# Patient Record
Sex: FEMALE | Race: WHITE | NOT HISPANIC OR LATINO | Employment: OTHER | URBAN - METROPOLITAN AREA
[De-identification: names, ages, dates, MRNs, and addresses within clinical notes are randomized per-mention and may not be internally consistent; named-entity substitution may affect disease eponyms.]

---

## 2017-01-03 RX ORDER — ATENOLOL 100 MG/1
TABLET ORAL
Qty: 180 CAPSULE | Refills: 2 | Status: SHIPPED | OUTPATIENT
Start: 2017-01-03 | End: 2017-10-08 | Stop reason: SDUPTHER

## 2017-02-07 RX ORDER — METOPROLOL SUCCINATE 100 MG/1
TABLET, EXTENDED RELEASE ORAL
Qty: 90 TABLET | Refills: 2 | Status: SHIPPED | OUTPATIENT
Start: 2017-02-07 | End: 2017-11-06 | Stop reason: SDUPTHER

## 2017-02-27 ENCOUNTER — APPOINTMENT (OUTPATIENT)
Dept: WOMENS IMAGING | Facility: HOSPITAL | Age: 72
End: 2017-02-27

## 2017-02-27 PROCEDURE — G0202 SCR MAMMO BI INCL CAD: HCPCS | Performed by: RADIOLOGY

## 2017-03-15 ENCOUNTER — OFFICE VISIT (OUTPATIENT)
Dept: CARDIOLOGY | Facility: CLINIC | Age: 72
End: 2017-03-15

## 2017-03-15 VITALS
DIASTOLIC BLOOD PRESSURE: 80 MMHG | SYSTOLIC BLOOD PRESSURE: 130 MMHG | HEIGHT: 63 IN | BODY MASS INDEX: 47.31 KG/M2 | HEART RATE: 97 BPM | WEIGHT: 267 LBS

## 2017-03-15 DIAGNOSIS — G47.33 OBSTRUCTIVE SLEEP APNEA SYNDROME: ICD-10-CM

## 2017-03-15 DIAGNOSIS — E66.01 MORBID OBESITY, UNSPECIFIED OBESITY TYPE (HCC): ICD-10-CM

## 2017-03-15 DIAGNOSIS — I10 ESSENTIAL HYPERTENSION: ICD-10-CM

## 2017-03-15 DIAGNOSIS — I49.3 VENTRICULAR PREMATURE BEATS: ICD-10-CM

## 2017-03-15 DIAGNOSIS — I48.20 CHRONIC ATRIAL FIBRILLATION (HCC): Primary | ICD-10-CM

## 2017-03-15 PROCEDURE — 93000 ELECTROCARDIOGRAM COMPLETE: CPT | Performed by: INTERNAL MEDICINE

## 2017-03-15 PROCEDURE — 99214 OFFICE O/P EST MOD 30 MIN: CPT | Performed by: INTERNAL MEDICINE

## 2017-03-15 NOTE — PROGRESS NOTES
Date of Office Visit: 03/15/2017  Encounter Provider: Brit Mejia MD  Place of Service: [facility] CARDIOLOGY  Patient Name: Prema Hawthorne  :1945      Patient ID:  Prema Hawthorne is a 71 y.o. female is here for  followup for         History of Present Illness    She came to [facility] in 2011. She came in with complaints of shortness of breath and fatigue. She was found to be in atrial fibrillation with a rapid ventricular response. An echocardiogram revealed an ejection fraction of 35%. She was admitted to the hospital and had a transesophageal echocardiogram and cardioversion. She came to our office for a stress test and was back in atrial fibrillation with rapid ventricular response. Heart catheterization in  was performed which showed a normal left main, normal left anterior descending artery, normal circumflex. The right coronary artery was codominant without significant disease. She was started on amiodarone at that time. She spontaneously cardioverted back to sinus rhythm. Repeat echo in May of 2011 showed that her LV function had returned to normal at 67%, and there was no significant valvular disease.  She was switched to flecainide 50 mg a day. This was titrated up 100 mg a day, but then decreased because of bradycardia.      She continues to do well. She has no palpitations, chest pain or dyspnea. She exercises regularly doing a recumbent bike, exercising 1 hour, 5 days per week.  Her energy level is good. She has no dizziness or syncope. She has some knee pain.  She has not lost weight.  She's has no falls.       She does not know when she is in atrial fibrillation. She does have obstructive sleep apnea and follows with Dr. Tolentino. Her sleep apnea is well controlled.       Past Medical History   Diagnosis Date   • Atrial fibrillation      with rapid ventricular response   • Cardiomyopathy      nonischemic   • Hypertension    • Morbid obesity    • DAVID  (obstructive sleep apnea)    • PVC (premature ventricular contraction)          History reviewed. No pertinent past surgical history.    Current Outpatient Prescriptions on File Prior to Visit   Medication Sig Dispense Refill   • Coenzyme Q10 (COQ10 PO) Take 1 capsule by mouth daily.     • furosemide (LASIX) 40 MG tablet Take 1 tablet by mouth daily as needed (for edema). 30 tablet 1   • metoprolol succinate XL (TOPROL-XL) 100 MG 24 hr tablet TAKE 1 TABLET EVERY NIGHT 90 tablet 2   • PRADAXA 150 MG capsu TAKE 1 CAPSULE TWICE A  capsule 2   • ramipril (ALTACE) 5 MG capsule TAKE 1 CAPSULE DAILY 90 capsule 1   • [DISCONTINUED] MULTIPLE VITAMINS PO Take  by mouth daily.       No current facility-administered medications on file prior to visit.        Social History     Social History   • Marital status:      Spouse name: N/A   • Number of children: N/A   • Years of education: N/A     Occupational History   • Not on file.     Social History Main Topics   • Smoking status: Never Smoker   • Smokeless tobacco: Not on file      Comment: caffeine use   • Alcohol use Yes      Comment: social use   • Drug use: Not on file      Comment: caffeine use    • Sexual activity: Not on file     Other Topics Concern   • Not on file     Social History Narrative           Review of Systems   Constitution: Negative.   HENT: Negative for congestion and headaches.    Eyes: Negative for vision loss in left eye and vision loss in right eye.   Respiratory: Negative.  Negative for cough, hemoptysis, shortness of breath, sleep disturbances due to breathing, snoring, sputum production and wheezing.    Endocrine: Negative.    Hematologic/Lymphatic: Negative.    Skin: Negative for poor wound healing and rash.   Musculoskeletal: Negative for falls, gout, muscle cramps and myalgias.   Gastrointestinal: Negative for abdominal pain, diarrhea, dysphagia, hematemesis, melena, nausea and vomiting.   Neurological: Negative for excessive daytime  "sleepiness, dizziness, light-headedness, loss of balance, seizures and vertigo.   Psychiatric/Behavioral: Negative for depression and substance abuse. The patient is not nervous/anxious.        Procedures    ECG 12 Lead  Date/Time: 3/15/2017 12:35 PM  Performed by: OLIVA STAPLETON  Authorized by: OLIVA STAPLETON   Rhythm: atrial fibrillation  Ectopy: unifocal PVCs  Clinical impression: abnormal ECG               Objective:      Vitals:    03/15/17 1203   BP: 130/80   BP Location: Right arm   Patient Position: Sitting   Pulse: 97   Weight: 267 lb (121 kg)   Height: 63\" (160 cm)     Body mass index is 47.3 kg/(m^2).    Physical Exam   Constitutional: She is oriented to person, place, and time. She appears well-developed and well-nourished. No distress.   obese   HENT:   Head: Normocephalic and atraumatic.   Eyes: Conjunctivae are normal. No scleral icterus.   Neck: Neck supple. No JVD present. Carotid bruit is not present. No thyromegaly present.   Cardiovascular: Normal rate, regular rhythm, S1 normal, S2 normal, normal heart sounds and intact distal pulses.   No extrasystoles are present. PMI is not displaced.  Exam reveals no gallop.    No murmur heard.  Pulses:       Carotid pulses are 2+ on the right side, and 2+ on the left side.       Radial pulses are 2+ on the right side, and 2+ on the left side.        Dorsalis pedis pulses are 2+ on the right side, and 2+ on the left side.        Posterior tibial pulses are 2+ on the right side, and 2+ on the left side.   Pulmonary/Chest: Effort normal and breath sounds normal. No respiratory distress. She has no wheezes. She has no rhonchi. She has no rales. She exhibits no tenderness.   Abdominal: Soft. Bowel sounds are normal. She exhibits no distension, no abdominal bruit and no mass. There is no tenderness.   Musculoskeletal: She exhibits no edema or deformity.   Lymphadenopathy:     She has no cervical adenopathy.   Neurological: She is alert and oriented to " person, place, and time. No cranial nerve deficit.   Skin: Skin is warm and dry. No rash noted. She is not diaphoretic. No cyanosis. No pallor. Nails show no clubbing.   Psychiatric: She has a normal mood and affect. Judgment normal.   Vitals reviewed.      Lab Review:       Assessment:      Diagnosis Plan   1. Chronic atrial fibrillation     2. Essential hypertension     3. Ventricular premature beats     4. Obstructive sleep apnea syndrome     5. Morbid obesity, unspecified obesity type       1. Atrial fibrillation, rate controlled. She is anticoagulated with Pradaxa.   2. H/o Nonischemic cardiomyopathy. This was likely secondary to her atrial fibrillation with rapid ventricular response. She is on an ACE inhibitor and beta blocker. Her LV function had normalized by echo in May 2011.   3. Hypertension. Her blood pressure looks good.   4. Obstructive sleep apnea. She is compliant with her CPAP. This is well controlled.   5. Obesity. She is exercising regularly. Unfortunately, she has not lost weight. I encouraged her to continue with regular exercise.     Plan:       See back in 1 year, no changes.  Advised weight loss.     Atrial Fibrillation and Atrial Flutter  Assessment  • The patient has persistent atrial fibrillation  • The patient's CHADS2-VASc score is 3  • A [id number]-[id number] score of 2 or more is considered a high risk for a thromboembolic event    Plan  • Continue in atrial fibrillation with rate control  • Continue dabigatran for antithrombotic therapy, bleeding issues discussed  • Continue beta blocker for rate control

## 2017-05-12 RX ORDER — RAMIPRIL 5 MG/1
CAPSULE ORAL
Qty: 90 CAPSULE | Refills: 3 | Status: SHIPPED | OUTPATIENT
Start: 2017-05-12 | End: 2018-04-10 | Stop reason: SDUPTHER

## 2017-10-09 RX ORDER — ATENOLOL 100 MG/1
TABLET ORAL
Qty: 180 CAPSULE | Refills: 0 | Status: SHIPPED | OUTPATIENT
Start: 2017-10-09 | End: 2018-01-10 | Stop reason: SDUPTHER

## 2017-11-06 RX ORDER — METOPROLOL SUCCINATE 100 MG/1
TABLET, EXTENDED RELEASE ORAL
Qty: 90 TABLET | Refills: 0 | Status: SHIPPED | OUTPATIENT
Start: 2017-11-06 | End: 2018-01-13 | Stop reason: SDUPTHER

## 2017-12-11 ENCOUNTER — OFFICE VISIT (OUTPATIENT)
Dept: SLEEP MEDICINE | Facility: HOSPITAL | Age: 72
End: 2017-12-11
Attending: INTERNAL MEDICINE

## 2017-12-11 VITALS
HEART RATE: 47 BPM | HEIGHT: 64 IN | SYSTOLIC BLOOD PRESSURE: 149 MMHG | BODY MASS INDEX: 44.05 KG/M2 | WEIGHT: 258 LBS | DIASTOLIC BLOOD PRESSURE: 61 MMHG

## 2017-12-11 DIAGNOSIS — G47.33 OBSTRUCTIVE SLEEP APNEA SYNDROME: Primary | ICD-10-CM

## 2017-12-11 DIAGNOSIS — I50.22 SYSTOLIC CHF, CHRONIC (HCC): ICD-10-CM

## 2017-12-11 DIAGNOSIS — G47.34 SLEEP RELATED HYPOXIA: ICD-10-CM

## 2017-12-11 DIAGNOSIS — I48.0 PAROXYSMAL ATRIAL FIBRILLATION (HCC): ICD-10-CM

## 2017-12-11 DIAGNOSIS — E66.01 MORBID OBESITY (HCC): ICD-10-CM

## 2017-12-11 DIAGNOSIS — I10 ESSENTIAL HYPERTENSION: ICD-10-CM

## 2017-12-11 PROCEDURE — G0463 HOSPITAL OUTPT CLINIC VISIT: HCPCS

## 2017-12-11 NOTE — PROGRESS NOTES
Sleep Disorder Follow Up    Patient Name: Prema Hawthorne  Age/Sex: 72 y.o. female  : 1945  MRN: [id number]    Date of Encounter Visit: 2017  Referring Provider: Renetta Awad MD  Place of Service: [facility]  Patient Care Team:  No Known Provider as PCP - General    PROBLEM LIST:  1. Severe tricuspid sleep apnea on CPAP  2. Morbid obesity  3. Systolic congestive heart failure with EF 35%  4. Chronic atrial fibrillation on Pradaxa  5. Sleep-related hypoxemia controlled on CPAP    History of Present Illness:  Prema Hawthorne is a 72 y.o. female who was last seen in the office on 16 for history of severe obstructive sleep apnea, obesity, systolic and his heart failure, chronic A. fib and sleep-related hypoxemia controlled on CPAP.  She was initially diagnosed with severe obstructive sleep apnea in  with an AHI of 49.5 and treated with auto CPAP.    Since last visit, she has been doing well and is been able to lose some weight.  She denies any issues with her pressure but has had minimal issues with her mask with complaints of dry mouth, mainly when she has nasal congestion..Patient uses a nasal mask, which does fit well. Patient denies any air leak. Denies any excessive dry mouth.   Patient's equipment supplier is [employer] and last mask replacement was 6 months ago.  Patient sleeps better and has a deeper sleep with the CPAP and feels more energy during the day time.  Current CPAP setting 12-18 cm H20.  [facility] Sleepiness Scale (ESS) is 5.  Compliance download was reviewed and documented below.  Weight is down 1 pound since last visit.  Other comorbidities include hypertension, atrial fibrillation, systolic congestive heart failure and obesity.     Review of Systems:   Positive for nasal congestion.    A ten-system review was conducted and was otherwise negative.   Please refer to the follow up sleep questionnaire page one for details.    Past Medical History:  Past  medical, surgical, social, and family history, except as mentioned above, was unchanged from the last visit.     Past Medical History:   Diagnosis Date   • Atrial fibrillation     with rapid ventricular response   • Cardiomyopathy     nonischemic   • Hypertension    • Morbid obesity    • DAVID (obstructive sleep apnea)    • PVC (premature ventricular contraction)        No past surgical history on file.    Home Medications:     Current Outpatient Prescriptions:   •  Coenzyme Q10 (COQ10 PO), Take 1 capsule by mouth daily., Disp: , Rfl:   •  furosemide (LASIX) 40 MG tablet, Take 1 tablet by mouth daily as needed (for edema)., Disp: 30 tablet, Rfl: 1  •  metoprolol succinate XL (TOPROL-XL) 100 MG 24 hr tablet, TAKE 1 TABLET EVERY NIGHT, Disp: 90 tablet, Rfl: 0  •  PRADAXA 150 MG capsu, TAKE 1 CAPSULE TWICE A DAY, Disp: 180 capsule, Rfl: 0  •  ramipril (ALTACE) 5 MG capsule, TAKE 1 CAPSULE DAILY, Disp: 90 capsule, Rfl: 3    Allergies:  Allergies   Allergen Reactions   • Codeine        Past Social History:  Social History     Social History   • Marital status:      Spouse name: N/A   • Number of children: N/A   • Years of education: N/A     Social History Main Topics   • Smoking status: Never Smoker   • Smokeless tobacco: Not on file      Comment: caffeine use   • Alcohol use Yes      Comment: social use   • Drug use: Not on file      Comment: caffeine use    • Sexual activity: Not on file     Other Topics Concern   • Not on file     Social History Narrative       Past Family History:  Family History   Problem Relation Age of Onset   • Diabetes Mother    • Other Father      P[employer]EM[employer]ACEMENT         Objective:   Done and documented on sleep disorders center physical examination sheet, please refer to hand written note on the chart for details about the other pertinent negative findings.    Vital Signs:   Visit Vitals   • /61 (BP Location: Left arm, Patient Position: Sitting)   • Pulse (!) 47   • Ht 162.6 cm  "(64\")   • Wt 117 kg (258 lb)  Comment: per patient   • BMI 44.29 kg/m2     Wt Readings from Last 3 Encounters:   12/11/17 117 kg (258 lb)   03/15/17 121 kg (267 lb)   03/15/16 116 kg (255 lb)          Physical Exam:   General: AAOx3. Normal mood and affect.   HEENT:  Moist mucous membranes.  Septum midline. Mallampati 3-4 airway.     LUNGS: Non-labored breathing. CTAB. No wheezes.  HEART: Regular rate and rhythm. No murmur. Normal s1/s2  EXTREMITIES: 1+ edema. No cyanosis or clubbing. Normal gait    Diagnostic Data:  NPSG on 04/21/2011 showed AHI of 49.5 treated with auto CPAP.    Compliance download from the last 30 days [id number] compliance with average use of 7 hours and 3 minutes per night. Residual AHI of 1.6 on CPAP at 12-18 cm H20 with 0 seconds of average time in large air leak per day. Majority (90%) of the time the pressure is staying at or below 13.5 cm H2O and a mean pressure of 12.3 cm H2O.      Assessment and Plan:       ICD-10-CM ICD-9-CM   1. Severe DAVID on CPAP G47.33 327.23   2. Paroxysmal atrial fibrillation I48.0 427.31   3. Essential hypertension I10 401.9   4. Morbid obesity E66.01 278.01   5. Systolic CHF, chronic I50.22 428.22     428.0   6. Sleep related hypoxia G47.34 327.24       Recommendations:     Patient is compliant and has good control of DAVID on CPAP with clinical and subjective improvement and will continue on same setting of auto 12-18 cmH2O.     Encouraged weight loss as can help reduce symptoms of sleep apnea and improve overall health.    Sleep related hypoxia controlled on CPAP.     No orders of the defined types were placed in this encounter.    Return in about 1 year (around 12/11/2018).    Deloris Tolentino MD   [facility]   12/28/17  3:24 AM    EMR Dragon/Transcription disclaimer:   Much of this encounter note is an electronic transcription/translation of spoken language to printed text. The electronic translation of spoken language may permit erroneous, or at " times, nonsensical words or phrases to be inadvertently transcribed; Although I have reviewed the note for such errors, some may still exist.

## 2018-01-10 RX ORDER — ATENOLOL 100 MG/1
TABLET ORAL
Qty: 180 CAPSULE | Refills: 0 | Status: SHIPPED | OUTPATIENT
Start: 2018-01-10 | End: 2018-04-10 | Stop reason: SDUPTHER

## 2018-01-15 RX ORDER — METOPROLOL SUCCINATE 100 MG/1
TABLET, EXTENDED RELEASE ORAL
Qty: 90 TABLET | Refills: 0 | Status: SHIPPED | OUTPATIENT
Start: 2018-01-15 | End: 2018-06-04 | Stop reason: SDUPTHER

## 2018-03-02 ENCOUNTER — APPOINTMENT (OUTPATIENT)
Dept: WOMENS IMAGING | Facility: HOSPITAL | Age: 73
End: 2018-03-02

## 2018-03-02 PROCEDURE — 77067 SCR MAMMO BI INCL CAD: CPT | Performed by: RADIOLOGY

## 2018-03-02 PROCEDURE — 77063 BREAST TOMOSYNTHESIS BI: CPT | Performed by: RADIOLOGY

## 2018-04-10 RX ORDER — RAMIPRIL 5 MG/1
CAPSULE ORAL
Qty: 90 CAPSULE | Refills: 0 | Status: SHIPPED | OUTPATIENT
Start: 2018-04-10 | End: 2018-05-09

## 2018-04-10 RX ORDER — ATENOLOL 100 MG/1
TABLET ORAL
Qty: 180 CAPSULE | Refills: 0 | Status: SHIPPED | OUTPATIENT
Start: 2018-04-10 | End: 2018-07-11 | Stop reason: SDUPTHER

## 2018-05-09 ENCOUNTER — OFFICE VISIT (OUTPATIENT)
Dept: CARDIOLOGY | Facility: CLINIC | Age: 73
End: 2018-05-09

## 2018-05-09 VITALS
WEIGHT: 272 LBS | BODY MASS INDEX: 48.2 KG/M2 | DIASTOLIC BLOOD PRESSURE: 100 MMHG | HEART RATE: 81 BPM | SYSTOLIC BLOOD PRESSURE: 150 MMHG | HEIGHT: 63 IN

## 2018-05-09 DIAGNOSIS — I10 ESSENTIAL HYPERTENSION: Primary | ICD-10-CM

## 2018-05-09 DIAGNOSIS — G47.33 OBSTRUCTIVE SLEEP APNEA SYNDROME: ICD-10-CM

## 2018-05-09 DIAGNOSIS — I48.0 PAROXYSMAL ATRIAL FIBRILLATION (HCC): ICD-10-CM

## 2018-05-09 DIAGNOSIS — I49.3 VENTRICULAR PREMATURE BEATS: ICD-10-CM

## 2018-05-09 DIAGNOSIS — E66.01 MORBID OBESITY (HCC): ICD-10-CM

## 2018-05-09 PROCEDURE — 99214 OFFICE O/P EST MOD 30 MIN: CPT | Performed by: INTERNAL MEDICINE

## 2018-05-09 PROCEDURE — 93000 ELECTROCARDIOGRAM COMPLETE: CPT | Performed by: INTERNAL MEDICINE

## 2018-05-09 RX ORDER — RAMIPRIL 10 MG/1
10 CAPSULE ORAL DAILY
Qty: 90 CAPSULE | Refills: 3 | Status: SHIPPED | OUTPATIENT
Start: 2018-05-09 | End: 2019-01-16 | Stop reason: SDUPTHER

## 2018-05-09 NOTE — PROGRESS NOTES
Date of Office Visit: 2018  Encounter Provider: Brit Mejia MD  Place of Service: [facility] CARDIOLOGY  Patient Name: Prema Hawthorne  :1945      Patient ID:  Prema Hawthorne is a 72 y.o. female is here for  followup for atrial fibrillation.         History of Present Illness    She came to [facility] in 2011. She came in with complaints of shortness of breath and fatigue. She was found to be in atrial fibrillation with a rapid ventricular response. An echocardiogram revealed an ejection fraction of 35%. She was admitted to the hospital and had a transesophageal echocardiogram and cardioversion. She came to our office for a stress test and was back in atrial fibrillation with rapid ventricular response. Heart catheterization in  was performed which showed a normal left main, normal left anterior descending artery, normal circumflex. The right coronary artery was codominant without significant disease. She was started on amiodarone at that time. She spontaneously cardioverted back to sinus rhythm. Repeat echo in May of 2011 showed that her LV function had returned to normal at 67%, and there was no significant valvular disease.       She does not know when she is in atrial fibrillation. She does have obstructive sleep apnea and follows with Dr. Tolentino. Her sleep apnea is well controlled.     She is overall doing fine.  She's not lost weight.  She is exercising the pool.  She doesn't feel her heart racing or skipping.  She has no dizziness or syncope.  She's had no difficulty breathing.  Her energy level is somewhat low.  Her blood pressure is high here today.  She's been under some stress with her .  She is tolerating her medications well taking in as directed.    Past Medical History:   Diagnosis Date   • Atrial fibrillation     with rapid ventricular response   • Cardiomyopathy     nonischemic   • Essential hypertension    • Morbid obesity    • DAVID  (obstructive sleep apnea)    • PVC (premature ventricular contraction)          History reviewed. No pertinent surgical history.    Current Outpatient Prescriptions on File Prior to Visit   Medication Sig Dispense Refill   • Coenzyme Q10 (COQ10 PO) Take 1 capsule by mouth daily.     • furosemide (LASIX) 40 MG tablet Take 1 tablet by mouth daily as needed (for edema). 30 tablet 1   • metoprolol succinate XL (TOPROL-XL) 100 MG 24 hr tablet TAKE 1 TABLET EVERY NIGHT 90 tablet 0   • PRADAXA 150 MG capsu TAKE 1 CAPSULE TWICE A DAY (NEED TO CALL THE OFFICE TO MAKE AN APPOINTMENT FOR MORE FUTURE REFILLS) 180 capsule 0   • ramipril (ALTACE) 5 MG capsule TAKE 1 CAPSULE DAILY 90 capsule 0     No current facility-administered medications on file prior to visit.        Social History     Social History   • Marital status:      Spouse name: N/A   • Number of children: N/A   • Years of education: N/A     Occupational History   • Not on file.     Social History Main Topics   • Smoking status: Never Smoker   • Smokeless tobacco: Never Used      Comment: caffeine use   • Alcohol use Yes      Comment: social use   • Drug use: Unknown      Comment: caffeine use    • Sexual activity: Not on file     Other Topics Concern   • Not on file     Social History Narrative   • No narrative on file           Review of Systems   Constitution: Negative.   HENT: Negative for congestion.    Eyes: Negative for vision loss in left eye and vision loss in right eye.   Respiratory: Negative.  Negative for cough, hemoptysis, shortness of breath, sleep disturbances due to breathing, snoring, sputum production and wheezing.    Endocrine: Negative.    Hematologic/Lymphatic: Negative.    Skin: Negative for poor wound healing and rash.   Musculoskeletal: Negative for falls, gout, muscle cramps and myalgias.   Gastrointestinal: Negative for abdominal pain, diarrhea, dysphagia, hematemesis, melena, nausea and vomiting.   Neurological: Negative for  "excessive daytime sleepiness, dizziness, headaches, light-headedness, loss of balance, seizures and vertigo.   Psychiatric/Behavioral: Negative for depression and substance abuse. The patient is not nervous/anxious.        Procedures    ECG 12 Lead  Date/Time: 5/9/2018 9:53 AM  Performed by: OLIVA STAPLETON  Authorized by: OLIVA STAPLETON   Comparison: compared with previous ECG   Similar to previous ECG  Rhythm: atrial fibrillation  Ectopy: PVCs  Clinical impression: abnormal ECG                Objective:      Vitals:    05/09/18 0942   BP: 150/100   BP Location: Right arm   Patient Position: Sitting   Pulse: 81   Weight: 123 kg (272 lb)   Height: 160 cm (63\")     Body mass index is 48.18 kg/m².    Physical Exam   Constitutional: She is oriented to person, place, and time. She appears well-developed and well-nourished. No distress.   HENT:   Head: Normocephalic and atraumatic.   Eyes: Conjunctivae are normal. No scleral icterus.   Neck: Neck supple. No JVD present. Carotid bruit is not present. No thyromegaly present.   Cardiovascular: Normal rate, S1 normal, S2 normal, normal heart sounds and intact distal pulses.  An irregularly irregular rhythm present.  No extrasystoles are present. PMI is not displaced.  Exam reveals no gallop.    No murmur heard.  Pulses:       Carotid pulses are 2+ on the right side, and 2+ on the left side.       Radial pulses are 2+ on the right side, and 2+ on the left side.        Dorsalis pedis pulses are 2+ on the right side, and 2+ on the left side.        Posterior tibial pulses are 2+ on the right side, and 2+ on the left side.   Pulmonary/Chest: Effort normal and breath sounds normal. No respiratory distress. She has no wheezes. She has no rhonchi. She has no rales. She exhibits no tenderness.   Abdominal: Soft. Bowel sounds are normal. She exhibits no distension, no abdominal bruit and no mass. There is no tenderness.   Musculoskeletal: She exhibits no edema or " deformity.   Lymphadenopathy:     She has no cervical adenopathy.   Neurological: She is alert and oriented to person, place, and time. No cranial nerve deficit.   Skin: Skin is warm and dry. No rash noted. She is not diaphoretic. No cyanosis. No pallor. Nails show no clubbing.   Psychiatric: She has a normal mood and affect. Judgment normal.   Vitals reviewed.      Lab Review:       Assessment:      Diagnosis Plan   1. Essential hypertension     2. Paroxysmal atrial fibrillation     3. Obstructive sleep apnea syndrome     4. Ventricular premature beats     5. Morbid obesity       1. Atrial fibrillation, rate controlled. She is anticoagulated with Pradaxa.   2. H/o Nonischemic cardiomyopathy. This was likely secondary to her atrial fibrillation with rapid ventricular response. She is on an ACE inhibitor and beta blocker. Her LV function had normalized by echo in May 2011.   3. Hypertension. Her blood pressure is high, increase altace to 10mg daily.   4. Obstructive sleep apnea. She is compliant with her CPAP. This is well controlled.   5. Obesity. She is exercising regularly. Unfortunately, she has not lost weight. I encouraged her to continue with regular exercise.     Plan:       See jaime in 6 months.     Atrial Fibrillation and Atrial Flutter  Assessment  • The patient has permanent atrial fibrillation  • This is non-valvular in etiology  • The patient's CHADS2-VASc score is 3  • A [id number] score of 2 or more is considered a high risk for a thromboembolic event  • Dabigatran prescribed    Plan  • Continue in atrial fibrillation with rate control  • Continue dabigatran for antithrombotic therapy, bleeding issues discussed  • Continue beta blocker for rate control

## 2018-06-04 RX ORDER — METOPROLOL SUCCINATE 100 MG/1
TABLET, EXTENDED RELEASE ORAL
Qty: 90 TABLET | Refills: 2 | Status: SHIPPED | OUTPATIENT
Start: 2018-06-04 | End: 2019-01-16 | Stop reason: SDUPTHER

## 2018-07-11 RX ORDER — DABIGATRAN ETEXILATE 150 MG/1
150 CAPSULE ORAL EVERY 12 HOURS SCHEDULED
Qty: 180 CAPSULE | Refills: 0 | Status: SHIPPED | OUTPATIENT
Start: 2018-07-11 | End: 2018-09-16 | Stop reason: SDUPTHER

## 2018-09-17 RX ORDER — ATENOLOL 100 MG/1
TABLET ORAL
Qty: 180 CAPSULE | Refills: 0 | Status: SHIPPED | OUTPATIENT
Start: 2018-09-17 | End: 2018-12-04 | Stop reason: SDUPTHER

## 2018-12-04 RX ORDER — DABIGATRAN ETEXILATE 150 MG/1
150 CAPSULE ORAL 2 TIMES DAILY
Qty: 180 CAPSULE | Refills: 1 | Status: SHIPPED | OUTPATIENT
Start: 2018-12-04 | End: 2019-06-21 | Stop reason: SDUPTHER

## 2018-12-10 ENCOUNTER — OFFICE VISIT (OUTPATIENT)
Dept: SLEEP MEDICINE | Facility: HOSPITAL | Age: 73
End: 2018-12-10
Attending: INTERNAL MEDICINE

## 2018-12-10 VITALS
DIASTOLIC BLOOD PRESSURE: 88 MMHG | BODY MASS INDEX: 48.18 KG/M2 | HEART RATE: 97 BPM | HEIGHT: 63 IN | SYSTOLIC BLOOD PRESSURE: 153 MMHG

## 2018-12-10 DIAGNOSIS — I10 ESSENTIAL HYPERTENSION: ICD-10-CM

## 2018-12-10 DIAGNOSIS — I48.0 PAROXYSMAL ATRIAL FIBRILLATION (HCC): ICD-10-CM

## 2018-12-10 DIAGNOSIS — G47.33 OBSTRUCTIVE SLEEP APNEA SYNDROME: Primary | ICD-10-CM

## 2018-12-10 DIAGNOSIS — G47.34 SLEEP RELATED HYPOXIA: ICD-10-CM

## 2018-12-10 DIAGNOSIS — E66.01 MORBID OBESITY (HCC): ICD-10-CM

## 2018-12-10 PROCEDURE — G0463 HOSPITAL OUTPT CLINIC VISIT: HCPCS

## 2018-12-10 NOTE — PROGRESS NOTES
Sleep Disorder Follow Up    Patient Name: Prema Hawthorne  Age/Sex: 73 y.o. female  : 1945  MRN: [id number]    Date of Encounter Visit: 12/10/18  Referring Provider: Deloris Tolentino MD  Place of Service: [facility]  Patient Care Team:  Renetta Awad MD as PCP - General (Internal Medicine)    PROBLEM LIST:  1. Severe obstructive sleep apnea on CPAP  2. Morbid obesity  3. Systolic congestive heart failure with EF 35%  4. Chronic atrial fibrillation on Pradaxa  5. Sleep-related hypoxemia controlled on CPAP    History of Present Illness:  Prema Hawthorne is a 73 y.o. female who is here for follow up on severe DAVID and sleep related hypoxia on CPAP. Patient was last seen in the office on 17.    Since last visit, she is doing well, but gained some weight while caring for her  who was going through radiation. She now has some complaints of worsening dyspnea with exertion.    Patient uses machine every night with no complaints from the mask or the pressure.  Patient uses a nasal mask, which does fit well. Patient denies any air leak or dry mouth.   Patient's equipment supplier is [employer].  Patient sleeps better and has a deeper sleep with the machine and feels more energy during the day time.  Currently on auto CPAP 12-18 cm H20.  [place] Sleepiness Scale (ESS) is 5.  Compliance download was reviewed and documented below.  Weight is up about 10-15 pounds since last visit as reported by patient since she refused to be weighed today.  Other comorbidities include hypertension, atrial fibrillation, systolic congestive heart failure and obesity.     Review of Systems:   A ten-system review was conducted and was negative.   Please refer to the follow up sleep questionnaire page one for details.    Past Medical History:  Past medical, surgical, social, and family history, except as mentioned above, was unchanged from the last visit.     Past Medical History:   Diagnosis Date   • Atrial  "fibrillation (CMS/HCC)     with rapid ventricular response   • Cardiomyopathy (CMS/HCC)     nonischemic   • Essential hypertension    • Morbid obesity (CMS/HCC)    • DAVID (obstructive sleep apnea)    • PVC (premature ventricular contraction)        No past surgical history on file.    Home Medications:     Current Outpatient Medications:   •  Coenzyme Q10 (COQ10 PO), Take 1 capsule by mouth daily., Disp: , Rfl:   •  dabigatran etexilate (PRADAXA) 150 MG capsu, Take 1 capsule by mouth 2 (Two) Times a Day., Disp: 180 capsule, Rfl: 1  •  furosemide (LASIX) 40 MG tablet, Take 1 tablet by mouth daily as needed (for edema)., Disp: 30 tablet, Rfl: 1  •  metoprolol succinate XL (TOPROL-XL) 100 MG 24 hr tablet, TAKE 1 TABLET EVERY NIGHT, Disp: 90 tablet, Rfl: 2  •  ramipril (ALTACE) 10 MG capsule, Take 1 capsule by mouth Daily., Disp: 90 capsule, Rfl: 3  •  Turmeric Curcumin 500 MG capsule, Take 1 tablet by mouth Daily., Disp: , Rfl:     Allergies:  Allergies   Allergen Reactions   • Codeine        Past Social History:  Social History     Socioeconomic History   • Marital status:      Spouse name: Not on file   • Number of children: Not on file   • Years of education: Not on file   • Highest education level: Not on file   Tobacco Use   • Smoking status: Never Smoker   • Smokeless tobacco: Never Used   • Tobacco comment: caffeine use   Substance and Sexual Activity   • Alcohol use: Yes     Comment: social use       Past Family History:  Family History   Problem Relation Age of Onset   • Diabetes Mother    • Other Father         PACEMAKER PLACEMENT         Objective:   Done and documented on sleep disorders center physical examination sheet, please refer to hand written note on the chart for details about the other pertinent negative findings.    Vital Signs:   Visit Vitals  /88   Pulse 97   Ht 160 cm (63\")   BMI 48.18 kg/m²     Wt Readings from Last 3 Encounters:   05/09/18 123 kg (272 lb)   12/11/17 117 kg (258 " lb)   03/15/17 121 kg (267 lb)     Neck Circumference: 15 inches    Physical Exam:   General: AAOx3. Normal mood and affect.   HEENT:  Moist mucous membranes.  Septum midline. Mallampati 4 airway. Extra tonsillar folds.   LUNGS: Non-labored breathing. CTAB. No wheezes.  HEART: irregular rhythm and borderline tachycardia. No murmur. Normal s1/s2  EXTREMITIES: trace edema. No cyanosis or clubbing. Normal gait    Diagnostic Data:  NPSG on 04/21/2011 showed AHI of 49.5 treated with auto CPAP.    Compliance download from 11/7/18-12/6/18 showed 100% compliant with average use of 7 hours and 5 minutes. Residual AHI 3.6 on auto CPAP 12-18 cmH20 with mean pressure 12.4 cmH20 and 90th percentile pressure of 13.7 cmH20. No significant air leak.     Assessment and Plan:       ICD-10-CM ICD-9-CM   1. Obstructive sleep apnea syndrome G47.33 327.23   2. Sleep related hypoxia G47.34 327.24   3. Paroxysmal atrial fibrillation (CMS/[facility]) I48.0 427.31   4. Morbid obesity (CMS/[facility]) E66.01 278.01   5. Essential hypertension I10 401.9   6. Allergic rhinitis, unspecified seasonality, unspecified trigger J30.9 477.9       Recommendations:     Patient is compliant with PAP therapy and has good control of DAVID with clinical and subjective improvement and plans to continue CPAP.   · Continue CPAP at 12-18 cmH20.  · Renew and replace supplies as indicated.     Blood pressure borderline today.    Encouraged to monitor blood pressure and follow with cardiology   Reduce salt intake,   Continue  Metoprolol and ramipril.   She has been out of the lasix and if BP consistently high, she may benefit from adding diuretic to treatment regimen as she was using the lasix at least once a week to help with swelling.    HR is irregular today. Continue pradaxa and metoprolol. Monitor saturations and HR with exertion and bring results to follow up with cardiologist.     Consider overnight oximetry on CPAP if any worsening of symptoms.     Patient has gained  weight recently and plans to work on weight loss with weight watchers and increase exercise with recumbent bike.       No orders of the defined types were placed in this encounter.    No orders of the defined types were placed in this encounter.    Return in about 1 year (around 12/10/2019).    Deloris Tolentino MD   [facility]   12/10/18  1:41 PM    Dictated utilizing Dragon dictation

## 2018-12-14 PROBLEM — G47.34 SLEEP RELATED HYPOXIA: Status: ACTIVE | Noted: 2018-12-14

## 2019-01-09 ENCOUNTER — OFFICE VISIT (OUTPATIENT)
Dept: CARDIOLOGY | Facility: CLINIC | Age: 74
End: 2019-01-09

## 2019-01-09 VITALS
HEIGHT: 63 IN | BODY MASS INDEX: 49.79 KG/M2 | WEIGHT: 281 LBS | DIASTOLIC BLOOD PRESSURE: 88 MMHG | SYSTOLIC BLOOD PRESSURE: 148 MMHG | HEART RATE: 83 BPM

## 2019-01-09 DIAGNOSIS — G47.33 OBSTRUCTIVE SLEEP APNEA SYNDROME: ICD-10-CM

## 2019-01-09 DIAGNOSIS — I49.3 PVC (PREMATURE VENTRICULAR CONTRACTION): ICD-10-CM

## 2019-01-09 DIAGNOSIS — E66.01 MORBID OBESITY (HCC): ICD-10-CM

## 2019-01-09 DIAGNOSIS — I48.19 PERSISTENT ATRIAL FIBRILLATION (HCC): Primary | ICD-10-CM

## 2019-01-09 DIAGNOSIS — Z79.01 CHRONIC ANTICOAGULATION: ICD-10-CM

## 2019-01-09 DIAGNOSIS — I10 ESSENTIAL HYPERTENSION: ICD-10-CM

## 2019-01-09 PROCEDURE — 93000 ELECTROCARDIOGRAM COMPLETE: CPT | Performed by: NURSE PRACTITIONER

## 2019-01-09 PROCEDURE — 99214 OFFICE O/P EST MOD 30 MIN: CPT | Performed by: NURSE PRACTITIONER

## 2019-01-09 NOTE — PROGRESS NOTES
Date of Office Visit: 2019  Encounter Provider: MARCELINO Plascencia  Place of Service: [facility] CARD[facility]  Patient Name: Prema Hawthorne  :1945      Chief Complaint   Patient presents with   • Atrial Fibrillation   • Hypertension   :     Dear      HPI: Prema Hawthorne is a pleasant 73 y.o. female who presents today for cardiac follow up. She has a history of hypertension, obesity, and obstructive sleep apnea.  She was diagnosed with atrial fibrillation in 2011 and an echocardiogram revealed an EF of 35%.  She had a cardioversion completed and then developed atrial fibrillation once again.  She had a heart catheterization in  which showed normal coronary arteries.  Repeat echocardiogram 2011 revealed an improved ejection fraction is 67%.  She has remains on dabigatran and Toprol-XL for treatment of atrial fibrillation.  She was last seen in the office by Dr. Mejia in May 2018.    She presents today for her 6 month follow-up.  She reports mild dyspnea upon exertion that resolves with rest and this is unchanged.  She denies chest pain, PND, orthopnea, cough, edema, palpitations, dizziness, or syncope.  Occasionally she feels hot sweats.  She suffers from bilateral knee pain and has recently been undergoing knee injections.  She said she is not ready for surgery at this point.  She has a history of sleep apnea and is compliant with her CPAP machine.  Both her blood pressure and heart rate are elevated today and she says she's very nervous to be here.  She remains asymptomatic with her atrial fibrillation.  We called her previous PCP office and she has not had any blood work completed since 2016.  She said she is in the process of finding a new primary care physician.  She has remained on dabigatran and denies any bleeding.     Past Medical History:   Diagnosis Date   • Atrial fibrillation (CMS/HCC)     with rapid ventricular response   • Cardiomyopathy  (CMS/[facility])     nonischemic   • Essential hypertension    • Morbid obesity (CMS/[facility])    • DAVID (obstructive sleep apnea)    • PVC (premature ventricular contraction)    • Ventricular premature beats 3/15/2016       History reviewed. No pertinent surgical history.    Social History     Socioeconomic History   • Marital status:      Spouse name: Not on file   • Number of children: Not on file   • Years of education: Not on file   • Highest education level: Not on file   Social Needs   • Financial resource strain: Not on file   • Food insecurity - worry: Not on file   • Food insecurity - inability: Not on file   • Transportation needs - medical: Not on file   • Transportation needs - non-medical: Not on file   Occupational History   • Not on file   Tobacco Use   • Smoking status: Never Smoker   • Smokeless tobacco: Never Used   • Tobacco comment: caffeine use   Substance and Sexual Activity   • Alcohol use: Yes     Comment: social use   • Drug use: Not on file     Comment: caffeine use    • Sexual activity: Not on file   Other Topics Concern   • Not on file   Social History Narrative   • Not on file       Family History   Problem Relation Age of Onset   • Diabetes Mother    • Other Father         PACEMAKER PLACEMENT       The following portion of the patient's history were reviewed and updated as appropriate: past medical history, past surgical history, past social history, past family history, allergies, current medications, and problem list.    Review of Systems   Constitution: Negative for chills, diaphoresis, fever, malaise/fatigue, night sweats, weight gain and weight loss.   HENT: Negative for hearing loss, nosebleeds, sore throat and tinnitus.    Eyes: Negative for blurred vision, double vision, pain and visual disturbance.   Cardiovascular: Positive for dyspnea on exertion. Negative for chest pain, claudication, cyanosis, irregular heartbeat, leg swelling, near-syncope, orthopnea, palpitations, paroxysmal  "nocturnal dyspnea and syncope.   Respiratory: Negative for cough, hemoptysis, shortness of breath, snoring and wheezing.    Endocrine: Negative for cold intolerance, heat intolerance and polyuria.   Hematologic/Lymphatic: Negative for bleeding problem. Does not bruise/bleed easily.   Skin: Negative for color change, dry skin, flushing and itching.   Musculoskeletal: Negative for falls, joint pain, joint swelling, muscle cramps, muscle weakness and myalgias.   Gastrointestinal: Negative for abdominal pain, constipation, heartburn, melena, nausea and vomiting.   Genitourinary: Negative for dysuria and hematuria.   Neurological: Negative for excessive daytime sleepiness, dizziness, light-headedness, loss of balance, numbness, paresthesias, seizures and vertigo.   Psychiatric/Behavioral: Negative for altered mental status, depression, memory loss and substance abuse. The patient does not have insomnia and is not nervous/anxious.    Allergic/Immunologic: Negative for environmental allergies.       Allergies   Allergen Reactions   • Codeine          Current Outpatient Medications:   •  Coenzyme Q10 (COQ10 PO), Take 1 capsule by mouth daily., Disp: , Rfl:   •  dabigatran etexilate (PRADAXA) 150 MG capsu, Take 1 capsule by mouth 2 (Two) Times a Day., Disp: 180 capsule, Rfl: 1  •  metoprolol succinate XL (TOPROL-XL) 100 MG 24 hr tablet, TAKE 1 TABLET EVERY NIGHT, Disp: 90 tablet, Rfl: 2  •  ramipril (ALTACE) 10 MG capsule, Take 1 capsule by mouth Daily., Disp: 90 capsule, Rfl: 3  •  Turmeric Curcumin 500 MG capsule, Take 1 tablet by mouth Daily., Disp: , Rfl:         Objective:     Vitals:    01/09/19 1144 01/09/19 1215   BP: 148/88    BP Location: Left arm    Patient Position: Sitting    Pulse: 117 83   Weight: 127 kg (281 lb)    Height: 160 cm (63\")      Body mass index is 49.78 kg/m².    PHYSICAL EXAM:    Vitals Reviewed.   General Appearance: No acute distress, well developed and well nourished.  Obese.  Eyes: " Conjunctiva and lids: No erythema, swelling, or discharge. Sclera non-icteric.   HENT: Atraumatic, normocephalic. External eyes, ears, and nose normal. No hearing loss noted. Mucous membranes normal. Lips not cyanotic. Neck supple with no tenderness.  Respiratory: No signs of respiratory distress. Respiration rhythm and depth normal.   Clear to auscultation. No rales, crackles, rhonchi, or wheezing auscultated.   Cardiovascular:  Jugular Venous Pressure: Normal  Heart Rate and Rhythm: Irregularly, irregular.  Heart Sounds: Normal S1 and S2. No S3 or S4 noted.  Murmurs: No murmurs noted. No rubs, thrills, or gallops.   Arterial Pulses: Carotid pulses normal. No carotid bruit noted. Posterior tibialis and dorsalis pedis pulses normal.   Lower Extremities: No edema noted.  Gastrointestinal:  Abdomen soft, non-distended, non-tender. Normal bowel sounds. No hepatomegaly.   Musculoskeletal: Normal movement of extremities  Skin and Nails: General appearance normal. No pallor, cyanosis, diaphoresis. Skin temperature normal. No clubbing of fingernails.   Psychiatric: Patient alert and oriented to person, place, and time. Speech and behavior appropriate. Normal mood and affect.       ECG 12 Lead  Date/Time: 1/9/2019 11:49 AM  Performed by: Aditi Louis APRN  Authorized by: Aditi Louis APRN   Comparison: compared with previous ECG from 5/9/2018  Similar to previous ECG  Rhythm: atrial fibrillation  Ectopy: PVCs  Rate: tachycardic  BPM: 117  Conduction: conduction normal  T Waves: T waves normal  QRS axis: normal  Q waves: V1 and V2  Clinical impression: abnormal ECG  Comments: ST-T abnormalities noted              Assessment:       Diagnosis Plan   1. Persistent atrial fibrillation (CMS/HCC)  Holter Monitor - 24 Hour    Comprehensive Metabolic Panel    CBC Auto Differential    TSH    Lipid Panel   2. PVC (premature ventricular contraction)     3. Chronic anticoagulation     4. Essential hypertension  Lipid Panel    5. Morbid obesity (CMS/[facility])     6. Obstructive sleep apnea syndrome            Plan:       1.  Persistent Atrial fibrillation: She remains in asymptomatic atrial fibrillation.  She has had some heart rate variability with home heart rates in the 40s and today she's 117 bpm on the EKG.  She is on Toprol- mg daily which is a good dosage.  I've recommended that we further evaluate her heart rates with a 24-hour Holter monitor.  She has PVCs which could also be contributing to abnormal heart rate calculations.  She remains on dabigatran for stroke prevention.  She has not had blood work completed since 2016 and I've recommended a CBC, CMP, TSH, and a lipid panel.     Atrial Fibrillation and Atrial Flutter  Assessment  • The patient has persistent atrial fibrillation  • The patient's CHADS2-VASc score is 4  • A [id number] score of 2 or more is considered a high risk for a thromboembolic event  • Dabigatran prescribed    Plan  • Continue in atrial fibrillation with rate control  • Continue dabigatran for antithrombotic therapy, bleeding issues discussed  • Continue beta blocker for rate control      2.  PVCs: Noted on today's EKG and she is asymptomatic.  Continue Toprol-XL.    3.  Systolic Heart Failure: Ejection fraction has normalized.    4.  Hypertension: Blood pressure is elevated today.  I reviewed her home blood pressure readings which average 130s over 80s.    5.  Morbid Obesity: BMI 49.8.  She discussed weight loss and getting back into the swimming pool.    6.  Obstructive Sleep Apnea: Compliant with CPAP machine.    7.  Follow-up with Dr. Brit Mejia in 6 months, unless otherwise needed sooner.    ADDENDUM: She needs a repeat lipid panel in July 2019 to be ordered by Dr. Mejia.     As always, it has been a pleasure to participate in your patient's care. Thank you.       Sincerely,         MARCELINO Perry

## 2019-01-11 ENCOUNTER — TELEPHONE (OUTPATIENT)
Dept: CARDIOLOGY | Facility: CLINIC | Age: 74
End: 2019-01-11

## 2019-01-11 ENCOUNTER — LAB (OUTPATIENT)
Dept: LAB | Facility: HOSPITAL | Age: 74
End: 2019-01-11

## 2019-01-11 DIAGNOSIS — I10 ESSENTIAL HYPERTENSION: ICD-10-CM

## 2019-01-11 DIAGNOSIS — I48.19 PERSISTENT ATRIAL FIBRILLATION (HCC): ICD-10-CM

## 2019-01-11 LAB
ALBUMIN SERPL-MCNC: 4.1 G/DL (ref 3.5–5.2)
ALBUMIN/GLOB SERPL: 1.1 G/DL
ALP SERPL-CCNC: 51 U/L (ref 39–117)
ALT SERPL W P-5'-P-CCNC: 17 U/L (ref 1–33)
ANION GAP SERPL CALCULATED.3IONS-SCNC: 13.3 MMOL/L
AST SERPL-CCNC: 21 U/L (ref 1–32)
BASOPHILS # BLD AUTO: 0.04 10*3/MM3 (ref 0–0.2)
BASOPHILS NFR BLD AUTO: 0.6 % (ref 0–1.5)
BILIRUB SERPL-MCNC: 0.6 MG/DL (ref 0.1–1.2)
BUN BLD-MCNC: 24 MG/DL (ref 8–23)
BUN/CREAT SERPL: 25 (ref 7–25)
CALCIUM SPEC-SCNC: 10.1 MG/DL (ref 8.6–10.5)
CHLORIDE SERPL-SCNC: 104 MMOL/L (ref 98–107)
CHOLEST SERPL-MCNC: 217 MG/DL (ref 0–200)
CO2 SERPL-SCNC: 24.7 MMOL/L (ref 22–29)
CREAT BLD-MCNC: 0.96 MG/DL (ref 0.57–1)
DEPRECATED RDW RBC AUTO: 49.5 FL (ref 37–54)
EOSINOPHIL # BLD AUTO: 0.09 10*3/MM3 (ref 0–0.7)
EOSINOPHIL NFR BLD AUTO: 1.2 % (ref 0.3–6.2)
ERYTHROCYTE [DISTWIDTH] IN BLOOD BY AUTOMATED COUNT: 13.9 % (ref 11.7–13)
GFR SERPL CREATININE-BSD FRML MDRD: 57 ML/MIN/1.73
GLOBULIN UR ELPH-MCNC: 3.7 GM/DL
GLUCOSE BLD-MCNC: 85 MG/DL (ref 65–99)
HCT VFR BLD AUTO: 47.6 % (ref 35.6–45.5)
HDLC SERPL-MCNC: 42 MG/DL (ref 40–60)
HGB BLD-MCNC: 15.5 G/DL (ref 11.9–15.5)
IMM GRANULOCYTES # BLD AUTO: 0.08 10*3/MM3 (ref 0–0.03)
IMM GRANULOCYTES NFR BLD AUTO: 1.1 % (ref 0–0.5)
LDLC SERPL CALC-MCNC: 153 MG/DL (ref 0–100)
LDLC/HDLC SERPL: 3.64 {RATIO}
LYMPHOCYTES # BLD AUTO: 2.3 10*3/MM3 (ref 0.9–4.8)
LYMPHOCYTES NFR BLD AUTO: 31.6 % (ref 19.6–45.3)
MCH RBC QN AUTO: 31.6 PG (ref 26.9–32)
MCHC RBC AUTO-ENTMCNC: 32.6 G/DL (ref 32.4–36.3)
MCV RBC AUTO: 97.1 FL (ref 80.5–98.2)
MONOCYTES # BLD AUTO: 0.61 10*3/MM3 (ref 0.2–1.2)
MONOCYTES NFR BLD AUTO: 8.4 % (ref 5–12)
NEUTROPHILS # BLD AUTO: 4.15 10*3/MM3 (ref 1.9–8.1)
NEUTROPHILS NFR BLD AUTO: 57.1 % (ref 42.7–76)
PLATELET # BLD AUTO: 291 10*3/MM3 (ref 140–500)
PMV BLD AUTO: 10.5 FL (ref 6–12)
POTASSIUM BLD-SCNC: 4.6 MMOL/L (ref 3.5–5.2)
PROT SERPL-MCNC: 7.8 G/DL (ref 6–8.5)
RBC # BLD AUTO: 4.9 10*6/MM3 (ref 3.9–5.2)
SODIUM BLD-SCNC: 142 MMOL/L (ref 136–145)
TRIGL SERPL-MCNC: 110 MG/DL (ref 0–150)
TSH SERPL DL<=0.05 MIU/L-ACNC: 1.99 MIU/ML (ref 0.27–4.2)
VLDLC SERPL-MCNC: 22 MG/DL (ref 5–40)
WBC NRBC COR # BLD: 7.27 10*3/MM3 (ref 4.5–10.7)

## 2019-01-11 PROCEDURE — 84443 ASSAY THYROID STIM HORMONE: CPT

## 2019-01-11 PROCEDURE — 80061 LIPID PANEL: CPT

## 2019-01-11 PROCEDURE — 80053 COMPREHEN METABOLIC PANEL: CPT

## 2019-01-11 PROCEDURE — 85025 COMPLETE CBC W/AUTO DIFF WBC: CPT

## 2019-01-11 PROCEDURE — 36415 COLL VENOUS BLD VENIPUNCTURE: CPT

## 2019-01-11 NOTE — TELEPHONE ENCOUNTER
Labs:    · TSH normal.  · Comprehensive metabolic panel normal except for elevated BUN of 24.  I will recommend that she increase her fluid intake.  · Lipid panel: Total cholesterol 217, triglycerides 110, HDL 42, and .    I just spoke with patient via phone. She wants to try diet and exercise. Recheck in July 2019.

## 2019-01-16 ENCOUNTER — TELEPHONE (OUTPATIENT)
Dept: CARDIOLOGY | Facility: CLINIC | Age: 74
End: 2019-01-16

## 2019-01-16 DIAGNOSIS — I48.19 PERSISTENT ATRIAL FIBRILLATION (HCC): ICD-10-CM

## 2019-01-16 DIAGNOSIS — I49.3 VENTRICULAR PREMATURE BEATS: Primary | ICD-10-CM

## 2019-01-16 RX ORDER — RAMIPRIL 10 MG/1
10 CAPSULE ORAL DAILY
Qty: 90 CAPSULE | Refills: 3 | Status: SHIPPED | OUTPATIENT
Start: 2019-01-16 | End: 2019-08-08

## 2019-01-16 RX ORDER — METOPROLOL SUCCINATE 100 MG/1
100 TABLET, EXTENDED RELEASE ORAL 2 TIMES DAILY
Qty: 180 TABLET | Refills: 1 | Status: SHIPPED | OUTPATIENT
Start: 2019-01-16 | End: 2019-07-26 | Stop reason: SDUPTHER

## 2019-01-16 NOTE — TELEPHONE ENCOUNTER
Holter Result Text     · An abnormal monitor study.  · Premature ventricular contractions occured frequently.  · There is no significant bradycardia and no pauses. Normal average heart rate.  · The predominant rhythm noted during the testing period was atrial fibrillation     I have recommended increasing her metoprolol to 100 mg twice daily and follow up with me at end of February. I have asked her to call with any concerns.     Roque - please schedule echo and f/u appt with me at end of February. Thanks.

## 2019-01-18 ENCOUNTER — TELEPHONE (OUTPATIENT)
Dept: CARDIOLOGY | Facility: CLINIC | Age: 74
End: 2019-01-18

## 2019-01-18 NOTE — TELEPHONE ENCOUNTER
----- Message from MARCELINO Willis sent at 1/9/2019  1:57 PM EST -----    Follow up on Holter 1/14/19  Follow up on blood work   Discuss increasing ramipril for BP

## 2019-02-22 ENCOUNTER — HOSPITAL ENCOUNTER (OUTPATIENT)
Dept: CARDIOLOGY | Facility: HOSPITAL | Age: 74
Discharge: HOME | End: 2019-02-22
Admitting: NURSE PRACTITIONER

## 2019-02-22 ENCOUNTER — OFFICE VISIT (OUTPATIENT)
Dept: CARDIOLOGY | Facility: CLINIC | Age: 74
End: 2019-02-22

## 2019-02-22 VITALS
OXYGEN SATURATION: 97 % | WEIGHT: 281 LBS | DIASTOLIC BLOOD PRESSURE: 70 MMHG | SYSTOLIC BLOOD PRESSURE: 110 MMHG | BODY MASS INDEX: 49.79 KG/M2 | HEART RATE: 94 BPM | HEIGHT: 63 IN

## 2019-02-22 VITALS
HEART RATE: 93 BPM | DIASTOLIC BLOOD PRESSURE: 70 MMHG | SYSTOLIC BLOOD PRESSURE: 120 MMHG | WEIGHT: 284.6 LBS | HEIGHT: 63 IN | BODY MASS INDEX: 50.43 KG/M2

## 2019-02-22 DIAGNOSIS — I10 ESSENTIAL HYPERTENSION: ICD-10-CM

## 2019-02-22 DIAGNOSIS — I49.3 VENTRICULAR PREMATURE BEATS: ICD-10-CM

## 2019-02-22 DIAGNOSIS — E66.01 MORBID OBESITY (HCC): ICD-10-CM

## 2019-02-22 DIAGNOSIS — I48.19 PERSISTENT ATRIAL FIBRILLATION (HCC): ICD-10-CM

## 2019-02-22 DIAGNOSIS — E78.5 HYPERLIPIDEMIA, UNSPECIFIED HYPERLIPIDEMIA TYPE: ICD-10-CM

## 2019-02-22 DIAGNOSIS — G47.33 OBSTRUCTIVE SLEEP APNEA SYNDROME: ICD-10-CM

## 2019-02-22 DIAGNOSIS — I48.19 PERSISTENT ATRIAL FIBRILLATION (HCC): Primary | ICD-10-CM

## 2019-02-22 DIAGNOSIS — Z79.01 CHRONIC ANTICOAGULATION: ICD-10-CM

## 2019-02-22 DIAGNOSIS — I50.20 SYSTOLIC HEART FAILURE, UNSPECIFIED HF CHRONICITY (HCC): ICD-10-CM

## 2019-02-22 LAB
ASCENDING AORTA: 3.3 CM
BH CV ECHO MEAS - ACS: 2 CM
BH CV ECHO MEAS - AO MAX PG (FULL): 2.9 MMHG
BH CV ECHO MEAS - AO MAX PG: 3.7 MMHG
BH CV ECHO MEAS - AO MEAN PG (FULL): 1.7 MMHG
BH CV ECHO MEAS - AO MEAN PG: 2.2 MMHG
BH CV ECHO MEAS - AO ROOT AREA (BSA CORRECTED): 1.5
BH CV ECHO MEAS - AO ROOT AREA: 8.4 CM^2
BH CV ECHO MEAS - AO ROOT DIAM: 3.3 CM
BH CV ECHO MEAS - AO V2 MAX: 96 CM/SEC
BH CV ECHO MEAS - AO V2 MEAN: 70 CM/SEC
BH CV ECHO MEAS - AO V2 VTI: 19.6 CM
BH CV ECHO MEAS - ASC AORTA: 3.3 CM
BH CV ECHO MEAS - AVA(I,A): 1.3 CM^2
BH CV ECHO MEAS - AVA(I,D): 1.3 CM^2
BH CV ECHO MEAS - AVA(V,A): 1.5 CM^2
BH CV ECHO MEAS - AVA(V,D): 1.5 CM^2
BH CV ECHO MEAS - BSA(HAYCOCK): 2.5 M^2
BH CV ECHO MEAS - BSA: 2.2 M^2
BH CV ECHO MEAS - BZI_BMI: 49.8 KILOGRAMS/M^2
BH CV ECHO MEAS - BZI_METRIC_HEIGHT: 160 CM
BH CV ECHO MEAS - BZI_METRIC_WEIGHT: 127.5 KG
BH CV ECHO MEAS - EDV(MOD-SP2): 105 ML
BH CV ECHO MEAS - EDV(MOD-SP4): 119 ML
BH CV ECHO MEAS - EDV(TEICH): 176.3 ML
BH CV ECHO MEAS - EF(CUBED): 61.3 %
BH CV ECHO MEAS - EF(MOD-BP): 52 %
BH CV ECHO MEAS - EF(MOD-SP2): 54.3 %
BH CV ECHO MEAS - EF(MOD-SP4): 49.6 %
BH CV ECHO MEAS - EF(TEICH): 52 %
BH CV ECHO MEAS - ESV(MOD-SP2): 48 ML
BH CV ECHO MEAS - ESV(MOD-SP4): 60 ML
BH CV ECHO MEAS - ESV(TEICH): 84.6 ML
BH CV ECHO MEAS - IVS/LVPW: 1
BH CV ECHO MEAS - IVSD: 1.2 CM
BH CV ECHO MEAS - LV DIASTOLIC VOL/BSA (35-75): 53.3 ML/M^2
BH CV ECHO MEAS - LV MASS(C)D: 300.9 GRAMS
BH CV ECHO MEAS - LV MASS(C)DI: 134.6 GRAMS/M^2
BH CV ECHO MEAS - LV MAX PG: 0.81 MMHG
BH CV ECHO MEAS - LV MEAN PG: 0.46 MMHG
BH CV ECHO MEAS - LV SYSTOLIC VOL/BSA (12-30): 26.9 ML/M^2
BH CV ECHO MEAS - LV V1 MAX: 45 CM/SEC
BH CV ECHO MEAS - LV V1 MEAN: 31.3 CM/SEC
BH CV ECHO MEAS - LV V1 VTI: 7.8 CM
BH CV ECHO MEAS - LVIDD: 5.9 CM
BH CV ECHO MEAS - LVIDS: 4.3 CM
BH CV ECHO MEAS - LVLD AP2: 7.2 CM
BH CV ECHO MEAS - LVLD AP4: 7 CM
BH CV ECHO MEAS - LVLS AP2: 6.6 CM
BH CV ECHO MEAS - LVLS AP4: 6.4 CM
BH CV ECHO MEAS - LVOT AREA (M): 3.1 CM^2
BH CV ECHO MEAS - LVOT AREA: 3.2 CM^2
BH CV ECHO MEAS - LVOT DIAM: 2 CM
BH CV ECHO MEAS - LVPWD: 1.2 CM
BH CV ECHO MEAS - MV DEC SLOPE: 377.3 CM/SEC^2
BH CV ECHO MEAS - MV DEC TIME: 0.15 SEC
BH CV ECHO MEAS - MV E MAX VEL: 84.4 CM/SEC
BH CV ECHO MEAS - MV MAX PG: 3.7 MMHG
BH CV ECHO MEAS - MV MEAN PG: 1.9 MMHG
BH CV ECHO MEAS - MV P1/2T MAX VEL: 93.8 CM/SEC
BH CV ECHO MEAS - MV P1/2T: 72.8 MSEC
BH CV ECHO MEAS - MV V2 MAX: 95.6 CM/SEC
BH CV ECHO MEAS - MV V2 MEAN: 63.1 CM/SEC
BH CV ECHO MEAS - MV V2 VTI: 18 CM
BH CV ECHO MEAS - MVA P1/2T LCG: 2.3 CM^2
BH CV ECHO MEAS - MVA(P1/2T): 3 CM^2
BH CV ECHO MEAS - MVA(VTI): 1.4 CM^2
BH CV ECHO MEAS - PA ACC TIME: 0.07 SEC
BH CV ECHO MEAS - PA MAX PG (FULL): 0.1 MMHG
BH CV ECHO MEAS - PA MAX PG: 0.88 MMHG
BH CV ECHO MEAS - PA PR(ACCEL): 48.9 MMHG
BH CV ECHO MEAS - PA V2 MAX: 46.9 CM/SEC
BH CV ECHO MEAS - RAP SYSTOLE: 3 MMHG
BH CV ECHO MEAS - RV MAX PG: 0.78 MMHG
BH CV ECHO MEAS - RV MEAN PG: 0.4 MMHG
BH CV ECHO MEAS - RV V1 MAX: 44.1 CM/SEC
BH CV ECHO MEAS - RV V1 MEAN: 29.4 CM/SEC
BH CV ECHO MEAS - RV V1 VTI: 7 CM
BH CV ECHO MEAS - RVSP: 13 MMHG
BH CV ECHO MEAS - SI(AO): 73.7 ML/M^2
BH CV ECHO MEAS - SI(CUBED): 57.7 ML/M^2
BH CV ECHO MEAS - SI(LVOT): 11.3 ML/M^2
BH CV ECHO MEAS - SI(MOD-SP2): 25.5 ML/M^2
BH CV ECHO MEAS - SI(MOD-SP4): 26.4 ML/M^2
BH CV ECHO MEAS - SI(TEICH): 41.1 ML/M^2
BH CV ECHO MEAS - SUP REN AO DIAM: 2.9 CM
BH CV ECHO MEAS - SV(AO): 164.6 ML
BH CV ECHO MEAS - SV(CUBED): 128.9 ML
BH CV ECHO MEAS - SV(LVOT): 25.2 ML
BH CV ECHO MEAS - SV(MOD-SP2): 57 ML
BH CV ECHO MEAS - SV(MOD-SP4): 59 ML
BH CV ECHO MEAS - SV(TEICH): 91.8 ML
BH CV ECHO MEAS - TAPSE (>1.6): 1.6 CM2
BH CV ECHO MEAS - TR MAX VEL: 157.1 CM/SEC
BH CV XLRA - RV BASE: 3.5 CM
BH CV XLRA - TDI S': 9 CM/SEC
LEFT ATRIUM VOLUME INDEX: 50 ML/M2
MAXIMAL PREDICTED HEART RATE: 146 BPM
SINUS: 3 CM
STJ: 3.2 CM
STRESS TARGET HR: 124 BPM

## 2019-02-22 PROCEDURE — 25010000002 PERFLUTREN (DEFINITY) 8.476 MG IN SODIUM CHLORIDE 0.9 % 10 ML INJECTION: Performed by: NURSE PRACTITIONER

## 2019-02-22 PROCEDURE — 93306 TTE W/DOPPLER COMPLETE: CPT

## 2019-02-22 PROCEDURE — 93306 TTE W/DOPPLER COMPLETE: CPT | Performed by: INTERNAL MEDICINE

## 2019-02-22 PROCEDURE — 99214 OFFICE O/P EST MOD 30 MIN: CPT | Performed by: NURSE PRACTITIONER

## 2019-02-22 PROCEDURE — 93000 ELECTROCARDIOGRAM COMPLETE: CPT | Performed by: NURSE PRACTITIONER

## 2019-02-22 RX ADMIN — PERFLUTREN 2 ML: 6.52 INJECTION, SUSPENSION INTRAVENOUS at 09:01

## 2019-02-22 NOTE — PROGRESS NOTES
Date of Office Visit: 2019  Encounter Provider: MARCELINO Plascencia  Place of Service: [facility] CARDIOLOGY  Patient Name: Prema Hawthorne  :1945      Chief Complaint   Patient presents with   • Atrial Fibrillation   • Hypertension   :     HPI: Prema Hawthorne is a pleasant 73 y.o. female who presents today for cardiac follow up. She has a history of hypertension, obesity, and obstructive sleep apnea.  She was diagnosed with atrial fibrillation in 2011 and an echocardiogram revealed an EF of 35%.  She had a cardioversion completed and then developed atrial fibrillation once again.  She had a heart catheterization in  which showed normal coronary arteries.  Repeat echocardiogram 2011 revealed an improved ejection fraction is 67%.  She remains on dabigatran and Toprol-XL for treatment of atrial fibrillation.  She is an established patient of Dr. Mejia.     I recently evaluated her in 2019.  She reported mild dyspnea upon exertion that resolves with rest and was unchanged, hot sweats, bilateral knee pain, and was asymptomatic with her atrial fibrillation. She had some heart rate variability according to her home heart rates with that reading in the 40s and when I saw her she was 117 bpm on the EKG.  I recommended a 24-hour Holter monitor because she was also having frequent PVCs which could contribute to the abnormal calculation of her heart rate.  The Holter monitor was worn 19 which showed atrial fibrillation with average heart rate 90, minimum 57, maximum 152 and frequent PVCs. I ordered blood work and her TSH was normal, CMP normal except for BUN of 24 (recommended to increase fluid intake), and lipid panel: Total cholesterol 217, triglycerides 110, HDL 42, and . I recommended increasing her metoprolol to 100 mg twice daily and having an echocardiogram completed.    She presents today for follow-up and is feeling fine on the increased  dose of metoprolol.  Her blood pressure and heart rate are both normal today.  She continues to experience mild dyspnea upon exertion that resolves with rest which is unchanged.  She denies chest pain, PND, orthopnea, cough, edema, palpitations, dizziness, or syncope. She suffers from bilateral knee pain and has recently been undergoing knee injections.  She said she is not ready for surgery at this point.  She has a history of sleep apnea and is compliant with her CPAP machine. She denies any black stools, blood in urine, or other bleeding.     Past Medical History:   Diagnosis Date   • Atrial fibrillation (CMS/HCC)     with rapid ventricular response   • Cardiomyopathy (CMS/HCC)     nonischemic   • Essential hypertension    • Morbid obesity (CMS/HCC)    • DAVID (obstructive sleep apnea)    • PVC (premature ventricular contraction)    • Ventricular premature beats 3/15/2016       History reviewed. No pertinent surgical history.    Social History     Socioeconomic History   • Marital status:      Spouse name: Not on file   • Number of children: Not on file   • Years of education: Not on file   • Highest education level: Not on file   Social Needs   • Financial resource strain: Not on file   • Food insecurity - worry: Not on file   • Food insecurity - inability: Not on file   • Transportation needs - medical: Not on file   • Transportation needs - non-medical: Not on file   Occupational History   • Not on file   Tobacco Use   • Smoking status: Never Smoker   • Smokeless tobacco: Never Used   Substance and Sexual Activity   • Alcohol use: Yes     Comment: caffeine use   • Drug use: Not on file     Comment: caffeine use    • Sexual activity: Not on file   Other Topics Concern   • Not on file   Social History Narrative   • Not on file       Family History   Problem Relation Age of Onset   • Diabetes Mother    • Other Father         [employer]       The following portion of the patient's history were  reviewed and updated as appropriate: past medical history, past surgical history, past social history, past family history, allergies, current medications, and problem list.    Review of Systems   Constitution: Negative for chills, diaphoresis, fever, malaise/fatigue, night sweats, weight gain and weight loss.   HENT: Negative for hearing loss, nosebleeds, sore throat and tinnitus.    Eyes: Negative for blurred vision, double vision, pain and visual disturbance.   Cardiovascular: Positive for dyspnea on exertion. Negative for chest pain, claudication, cyanosis, irregular heartbeat, leg swelling, near-syncope, orthopnea, palpitations, paroxysmal nocturnal dyspnea and syncope.   Respiratory: Negative for cough, hemoptysis, shortness of breath, snoring and wheezing.    Endocrine: Negative for cold intolerance, heat intolerance and polyuria.   Hematologic/Lymphatic: Negative for bleeding problem. Does not bruise/bleed easily.   Skin: Negative for color change, dry skin, flushing and itching.   Musculoskeletal: Positive for joint pain (knee). Negative for falls, joint swelling, muscle cramps, muscle weakness and myalgias.   Gastrointestinal: Negative for abdominal pain, constipation, heartburn, melena, nausea and vomiting.   Genitourinary: Negative for dysuria and hematuria.   Neurological: Negative for excessive daytime sleepiness, dizziness, light-headedness, loss of balance, numbness, paresthesias, seizures and vertigo.   Psychiatric/Behavioral: Negative for altered mental status, depression, memory loss and substance abuse. The patient does not have insomnia and is not nervous/anxious.    Allergic/Immunologic: Negative for environmental allergies.       Allergies   Allergen Reactions   • Codeine          Current Outpatient Medications:   •  Coenzyme Q10 (COQ10 PO), Take 1 capsule by mouth daily., Disp: , Rfl:   •  dabigatran etexilate (PRADAXA) 150 MG capsu, Take 1 capsule by mouth 2 (Two) Times a Day., Disp: 180  "capsule, Rfl: 1  •  metoprolol succinate XL (TOPROL-XL) 100 MG 24 hr tablet, Take 1 tablet by mouth 2 (Two) Times a Day., Disp: 180 tablet, Rfl: 1  •  ramipril (ALTACE) 10 MG capsule, Take 1 capsule by mouth Daily., Disp: 90 capsule, Rfl: 3  •  Turmeric Curcumin 500 MG capsule, Take 1 tablet by mouth Daily., Disp: , Rfl:   No current facility-administered medications for this visit.         Objective:     Vitals:    02/22/19 0901   BP: 120/70   BP Location: Left arm   Pulse: 93   Weight: 129 kg (284 lb 9.6 oz)   Height: 160 cm (63\")     Body mass index is 50.41 kg/m².    PHYSICAL EXAM:    Vitals Reviewed.   General Appearance: No acute distress, well developed and well nourished.  Obese.  Eyes: Conjunctiva and lids: No erythema, swelling, or discharge. Sclera non-icteric.   HENT: Atraumatic, normocephalic. External eyes, ears, and nose normal. No hearing loss noted. Mucous membranes normal. Lips not cyanotic. Neck supple with no tenderness.  Respiratory: No signs of respiratory distress. Respiration rhythm and depth normal.   Clear to auscultation. No rales, crackles, rhonchi, or wheezing auscultated.   Cardiovascular:  Jugular Venous Pressure: Normal  Heart Rate and Rhythm: Irregularly, irregular.  Heart Sounds: Normal S1 and S2. No S3 or S4 noted.  Murmurs: No murmurs noted. No rubs, thrills, or gallops.   Arterial Pulses: Carotid pulses normal. No carotid bruit noted. Posterior tibialis and dorsalis pedis pulses normal.   Lower Extremities: No edema noted.  Gastrointestinal:  Abdomen soft, non-distended, non-tender. Normal bowel sounds. No hepatomegaly.   Musculoskeletal: Normal movement of extremities  Skin and Nails: General appearance normal. No pallor, cyanosis, diaphoresis. Skin temperature normal. No clubbing of fingernails.   Psychiatric: Patient alert and oriented to person, place, and time. Speech and behavior appropriate. Normal mood and affect.       ECG 12 Lead  Date/Time: 2/22/2019 9:03 AM  Performed " by: Aditi Louis APRN  Authorized by: Aditi Louis, MARCELINO   Comparison: compared with previous ECG from 1/9/2019  Similar to previous ECG  Rhythm: atrial fibrillation  Ectopy: unifocal PVCs  Rate: normal  BPM: 93  Q waves: V1 and V2    ST Segments: ST segments normal  T Waves: T waves normal  QRS axis: normal    Clinical impression: abnormal EKG              Assessment:       Diagnosis Plan   1. Persistent atrial fibrillation (CMS/[facility])     2. Chronic anticoagulation     3. Ventricular premature beats     4. Systolic heart failure, unspecified HF chronicity (CMS/HCC)     5. Essential hypertension     6. Morbid obesity (CMS/[facility])     7. Obstructive sleep apnea syndrome     8. Hyperlipidemia, unspecified hyperlipidemia type            Plan:       1.  Persistent Atrial fibrillation: She remains in asymptomatic atrial fibrillation with controlled ventricular response.  She is doing well on Toprol- mg twice a day and will continue with that dosage.  She remains on dabigatran and denies any bleeding.    Atrial Fibrillation and Atrial Flutter  Assessment  • The patient has persistent atrial fibrillation  • The patient's CHADS2-VASc score is 4  • A [id number] score of 2 or more is considered a high risk for a thromboembolic event  • Dabigatran prescribed    Plan  • Continue in atrial fibrillation with rate control  • Continue dabigatran for antithrombotic therapy, bleeding issues discussed  • Continue beta blocker for rate control      2.  PVCs: Noted on today's EKG and she is asymptomatic.  Continue Toprol-XL.    3.  Systolic Heart Failure: Ejection fraction has normalized.    4.  Hypertension: Blood pressure is normal today.  I reviewed her home blood pressure readings which average 130s over 80s.    5.  Morbid Obesity: BMI 50.4.  She discussed weight loss and getting back into the swimming pool.    6.  Obstructive Sleep Apnea: Compliant with CPAP machine.    7.  Hyperlipidemia: She will be due for repeat  lipid panel in July 2019 which can be ordered during her next office visit with Dr. Mejia.    8.  Follow-up with Dr. Mejia as previously scheduled July 2019.  She is in the process of finding a new primary care provider and we provided her information to contact her liaison here at [facility].  She also had an echocardiogram completed earlier this morning and I will call her when the results are available.      As always, it has been a pleasure to participate in your patient's care. Thank you.       Sincerely,         MARCELINO Perry

## 2019-02-25 ENCOUNTER — TELEPHONE (OUTPATIENT)
Dept: CARDIOLOGY | Facility: CLINIC | Age: 74
End: 2019-02-25

## 2019-02-25 DIAGNOSIS — I49.3 PVC (PREMATURE VENTRICULAR CONTRACTION): ICD-10-CM

## 2019-02-25 DIAGNOSIS — R93.1 ABNORMAL ECHOCARDIOGRAM: ICD-10-CM

## 2019-02-25 DIAGNOSIS — R06.09 DYSPNEA ON EXERTION: Primary | ICD-10-CM

## 2019-02-25 NOTE — TELEPHONE ENCOUNTER
Echocardiogram Results:    · There is moderate septal hypokinesis  · Estimated EF appears to be in the range of 51 - 55%  · Left ventricular wall thickness is consistent with mild concentric hypertrophy.  · Normal right ventricular cavity size and systolic function noted.  · Left atrial cavity size is severely dilated.  · Right atrial cavity size is moderate-to-severely dilated.  · There is no evidence of pericardial effusion.    Dr. Mejia has recommended a stress test to be completed to assess the moderate septal hypokinesis.   She would like to proceed.     Roque - please arrange the stress test. Thank you.

## 2019-03-04 ENCOUNTER — APPOINTMENT (OUTPATIENT)
Dept: WOMENS IMAGING | Facility: HOSPITAL | Age: 74
End: 2019-03-04

## 2019-03-04 PROCEDURE — 77063 BREAST TOMOSYNTHESIS BI: CPT | Performed by: RADIOLOGY

## 2019-03-04 PROCEDURE — 77067 SCR MAMMO BI INCL CAD: CPT | Performed by: RADIOLOGY

## 2019-03-19 ENCOUNTER — HOSPITAL ENCOUNTER (OUTPATIENT)
Dept: CARDIOLOGY | Facility: HOSPITAL | Age: 74
Discharge: HOME | End: 2019-03-19
Admitting: NURSE PRACTITIONER

## 2019-03-19 VITALS — HEIGHT: 63 IN | BODY MASS INDEX: 50.78 KG/M2 | WEIGHT: 286.6 LBS

## 2019-03-19 DIAGNOSIS — I49.3 PVC (PREMATURE VENTRICULAR CONTRACTION): ICD-10-CM

## 2019-03-19 DIAGNOSIS — R93.1 ABNORMAL ECHOCARDIOGRAM: ICD-10-CM

## 2019-03-19 DIAGNOSIS — R06.09 DYSPNEA ON EXERTION: ICD-10-CM

## 2019-03-19 LAB
BH CV NUCLEAR PRIOR STUDY: 2
BH CV STRESS BP STAGE 1: NORMAL
BH CV STRESS COMMENTS STAGE 1: NORMAL
BH CV STRESS DOSE REGADENOSON STAGE 1: 0.4
BH CV STRESS DURATION MIN STAGE 1: 0
BH CV STRESS DURATION SEC STAGE 1: 10
BH CV STRESS HR STAGE 1: 98
BH CV STRESS PROTOCOL 1: NORMAL
BH CV STRESS RECOVERY BP: NORMAL MMHG
BH CV STRESS RECOVERY HR: 88 BPM
BH CV STRESS STAGE 1: 1
LV EF NUC BP: 56 %
MAXIMAL PREDICTED HEART RATE: 146 BPM
PERCENT MAX PREDICTED HR: 67.12 %
STRESS BASELINE BP: NORMAL MMHG
STRESS BASELINE HR: 93 BPM
STRESS PERCENT HR: 79 %
STRESS POST EXERCISE DUR SEC: 10 SEC
STRESS POST PEAK BP: NORMAL MMHG
STRESS POST PEAK HR: 98 BPM
STRESS TARGET HR: 124 BPM

## 2019-03-19 PROCEDURE — 93017 CV STRESS TEST TRACING ONLY: CPT

## 2019-03-19 PROCEDURE — 78492 MYOCRD IMG PET MLT RST&STRS: CPT

## 2019-03-19 PROCEDURE — 78492 MYOCRD IMG PET MLT RST&STRS: CPT | Performed by: INTERNAL MEDICINE

## 2019-03-19 PROCEDURE — A9555 RB82 RUBIDIUM: HCPCS | Performed by: NURSE PRACTITIONER

## 2019-03-19 PROCEDURE — 0 RUBIDIUM CHLORIDE: Performed by: NURSE PRACTITIONER

## 2019-03-19 PROCEDURE — 93018 CV STRESS TEST I&R ONLY: CPT | Performed by: INTERNAL MEDICINE

## 2019-03-19 PROCEDURE — 93016 CV STRESS TEST SUPVJ ONLY: CPT | Performed by: INTERNAL MEDICINE

## 2019-03-19 PROCEDURE — 25010000002 REGADENOSON 0.4 MG/5ML SOLUTION: Performed by: NURSE PRACTITIONER

## 2019-03-19 RX ADMIN — REGADENOSON 0.4 MG: 0.08 INJECTION, SOLUTION INTRAVENOUS at 10:06

## 2019-03-19 RX ADMIN — RUBIDIUM CHLORIDE RB-82 1 DOSE: 150 INJECTION, SOLUTION INTRAVENOUS at 09:56

## 2019-03-19 RX ADMIN — RUBIDIUM CHLORIDE RB-82 1 DOSE: 150 INJECTION, SOLUTION INTRAVENOUS at 10:06

## 2019-06-21 RX ORDER — ATENOLOL 100 MG/1
TABLET ORAL
Qty: 180 CAPSULE | Refills: 1 | Status: SHIPPED | OUTPATIENT
Start: 2019-06-21 | End: 2020-01-06

## 2019-06-28 ENCOUNTER — OFFICE VISIT (OUTPATIENT)
Dept: FAMILY MEDICINE CLINIC | Facility: CLINIC | Age: 74
End: 2019-06-28

## 2019-06-28 VITALS
BODY MASS INDEX: 49.43 KG/M2 | HEIGHT: 63 IN | RESPIRATION RATE: 16 BRPM | WEIGHT: 279 LBS | HEART RATE: 74 BPM | OXYGEN SATURATION: 98 % | DIASTOLIC BLOOD PRESSURE: 88 MMHG | SYSTOLIC BLOOD PRESSURE: 130 MMHG | TEMPERATURE: 97.8 F

## 2019-06-28 DIAGNOSIS — I10 ESSENTIAL HYPERTENSION: Primary | ICD-10-CM

## 2019-06-28 DIAGNOSIS — I48.19 PERSISTENT ATRIAL FIBRILLATION (HCC): ICD-10-CM

## 2019-06-28 DIAGNOSIS — E78.5 HYPERLIPIDEMIA, UNSPECIFIED HYPERLIPIDEMIA TYPE: ICD-10-CM

## 2019-06-28 DIAGNOSIS — L98.9 SKIN LESION: ICD-10-CM

## 2019-06-28 DIAGNOSIS — E66.01 OBESITY, MORBID (HCC): ICD-10-CM

## 2019-06-28 PROCEDURE — 99203 OFFICE O/P NEW LOW 30 MIN: CPT | Performed by: FAMILY MEDICINE

## 2019-06-28 NOTE — PROGRESS NOTES
Subjective   Prema Hawthorne is a 73 y.o. female.     73-year-old female presents today as a new patient to me to discuss her hypertension.    Hypertension: Patient is on ramipril 10 mg a day and metoprolol  mg a day.  Blood pressure today is 130/88.  Patient denies chest pain shortness of breath or headache with vision changes.    DAVID: Follows with sleep doctor; last saw Dr. Tolentino December 2018.  She is on auto CPAP 12-18 cm of water.  Symptoms are under control.  Residual AHI of 3.6 is noted at that appointment.  Patient was advised to consider overnight oximetry on CPAP if any worsening of symptoms.    Hyperlipidemia: Patient is not on any meds. Last lipid panel was checked in January 2019 and showed total cholesterol elevated 217 and .  She is working on diet and exercise.  Does water aerobics 3 times a week.  She is lost 8 pounds in the past 3 months.    A. Fib: Patient follows with cardiology.  Has a follow-up with Dr. Mejia of cardiology on July 24, 2019. Afib is persistent. Is on Pradaxa 150 mg BID.     Is getting DEXA scan done with Gyn in 12/2019.     Osteoarthritis knees bilaterally: Follows with Ortho; discussed steroid injections yesterday which are improving her pain.         PHQ-2/PHQ-9 Depression Screening 6/28/2019   Little interest or pleasure in doing things 1   Feeling down, depressed, or hopeless 0   Total Score 1       The following portions of the patient's history were reviewed and updated as appropriate: allergies, current medications, past family history, past medical history, past social history, past surgical history and problem list.    Review of Systems   Constitutional: Negative for activity change, appetite change, chills and fever.   HENT: Negative for congestion, sinus pressure and sore throat.    Eyes: Negative for blurred vision and double vision.   Respiratory: Negative for cough, chest tightness and shortness of breath.    Cardiovascular: Negative for chest pain.    Gastrointestinal: Negative for abdominal pain, blood in stool, constipation and diarrhea.   Genitourinary: Negative for dysuria.   Musculoskeletal: Positive for arthralgias and myalgias.   Skin: Positive for skin lesions.   Neurological: Negative for dizziness and headache.   Psychiatric/Behavioral: The patient is not nervous/anxious.        Objective   Physical Exam   Constitutional: She is oriented to person, place, and time. She appears well-developed and well-nourished.   HENT:   Head: Normocephalic and atraumatic.   Nose: Nose normal.   Mouth/Throat: Uvula is midline and mucous membranes are normal. No oropharyngeal exudate, posterior oropharyngeal edema, posterior oropharyngeal erythema or tonsillar abscesses.   Eyes: Conjunctivae and EOM are normal. Pupils are equal, round, and reactive to light.   Cardiovascular: Normal rate and regular rhythm.   Pulmonary/Chest: Effort normal and breath sounds normal. No respiratory distress. She has no decreased breath sounds.   Lymphadenopathy:        Right cervical: No superficial cervical adenopathy present.       Left cervical: No superficial cervical adenopathy present.   Neurological: She is alert and oriented to person, place, and time.   Skin:        Psychiatric: She has a normal mood and affect. Her speech is normal.     Assessment/Plan     Prema was seen today for establish care, hypertension and earache.    Diagnoses and all orders for this visit:    Essential hypertension  -     Comprehensive Metabolic Panel  -     TSH Rfx On Abnormal To Free T4    Obesity, morbid (CMS/HCC)    Hyperlipidemia, unspecified hyperlipidemia type  -     Lipid Panel    Persistent atrial fibrillation (CMS/HCC)  -     CBC & Differential    Skin lesion  -     Ambulatory Referral to Dermatology    Other orders  -     Cancel: Hemoglobin A1c      Referral to dermatology for skin lesion noticed on the abdomen.  Follow-up screening labs.  Return to clinic in 6 months for [employer]  visit or sooner if needed.

## 2019-06-28 NOTE — PATIENT INSTRUCTIONS
Obesity, Adult  Obesity is the condition of having too much total body fat. Being overweight or obese means that your weight is greater than what is considered healthy for your body size. Obesity is determined by a measurement called BMI. BMI is an estimate of body fat and is calculated from height and weight. For adults, a BMI of 30 or higher is considered obese.  Obesity can eventually lead to other health concerns and major illnesses, including:  · Stroke.  · Coronary artery disease (CAD).  · Type 2 diabetes.  · Some types of cancer, including cancers of the colon, breast, uterus, and gallbladder.  · Osteoarthritis.  · High blood pressure (hypertension).  · High cholesterol.  · Sleep apnea.  · Gallbladder stones.  · Infertility problems.    What are the causes?  The main cause of obesity is taking in (consuming) more calories than your body uses for energy. Other factors that contribute to this condition may include:  · Being born with genes that make you more likely to become obese.  · Having a medical condition that causes obesity. These conditions include:  ? Hypothyroidism.  ? Polycystic ovarian syndrome (PCOS).  ? Binge-eating disorder.  ? Cushing syndrome.  · Taking certain medicines, such as steroids, antidepressants, and seizure medicines.  · Not being physically active (sedentary lifestyle).  · Living where there are limited places to exercise safely or buy healthy foods.  · Not getting enough sleep.    What increases the risk?  The following factors may increase your risk of this condition:  · Having a family history of obesity.  · Being a woman of -American descent.  · Being a man of  descent.    What are the signs or symptoms?  Having excessive body fat is the main symptom of this condition.  How is this diagnosed?  This condition may be diagnosed based on:  · Your symptoms.  · Your medical history.  · A physical exam. Your health care provider may measure:  ? Your BMI. If you are  an adult with a BMI between 25 and less than 30, you are considered overweight. If you are an adult with a BMI of 30 or higher, you are considered obese.  ? The distances around your hips and your waist (circumferences). These may be compared to each other to help diagnose your condition.  ? Your skinfold thickness. Your health care provider may gently pinch a fold of your skin and measure it.    How is this treated?  Treatment for this condition often includes changing your lifestyle. Treatment may include some or all of the following:  · Dietary changes. Work with your health care provider and a dietitian to set a weight-loss goal that is healthy and reasonable for you. Dietary changes may include eating:  ? Smaller portions. A portion size is the amount of a particular food that is healthy for you to eat at one time. This varies from person to person.  ? Low-calorie or low-fat options.  ? More whole grains, fruits, and vegetables.  · Regular physical activity. This may include aerobic activity (cardio) and strength training.  · Medicine to help you lose weight. Your health care provider may prescribe medicine if you are unable to lose 1 pound a week after 6 weeks of eating more healthily and doing more physical activity.  · Surgery. Surgical options may include gastric banding and gastric bypass. Surgery may be done if:  ? Other treatments have not helped to improve your condition.  ? You have a BMI of 40 or higher.  ? You have life-threatening health problems related to obesity.    Follow these instructions at home:    Eating and drinking    · Follow recommendations from your health care provider about what you eat and drink. Your health care provider may advise you to:  ? Limit fast foods, sweets, and processed snack foods.  ? Choose low-fat options, such as low-fat milk instead of whole milk.  ? Eat 5 or more servings of fruits or vegetables every day.  ? Eat at home more often. This gives you more control  over what you eat.  ? Choose healthy foods when you eat out.  ? Learn what a healthy portion size is.  ? Keep low-fat snacks on hand.  ? Avoid sugary drinks, such as soda, fruit juice, iced tea sweetened with sugar, and flavored milk.  ? Eat a healthy breakfast.  · Drink enough water to keep your urine clear or pale yellow.  · Do not go without eating for long periods of time (do not fast) or follow a fad diet. Fasting and fad diets can be unhealthy and even dangerous.  Physical Activity  · Exercise regularly, as told by your health care provider. Ask your health care provider what types of exercise are safe for you and how often you should exercise.  · Warm up and stretch before being active.  · Cool down and stretch after being active.  · Rest between periods of activity.  Lifestyle  · Limit the time that you spend in front of your TV, computer, or video game system.  · Find ways to reward yourself that do not involve food.  · Limit alcohol intake to no more than 1 drink a day for nonpregnant women and 2 drinks a day for men. One drink equals 12 oz of beer, 5 oz of wine, or 1½ oz of hard liquor.  General instructions  · Keep a weight loss journal to keep track of the food you eat and how much you exercise you get.  · Take over-the-counter and prescription medicines only as told by your health care provider.  · Take vitamins and supplements only as told by your health care provider.  · Consider joining a support group. Your health care provider may be able to recommend a support group.  · Keep all follow-up visits as told by your health care provider. This is important.  Contact a health care provider if:  · You are unable to meet your weight loss goal after 6 weeks of dietary and lifestyle changes.  This information is not intended to replace advice given to you by your health care provider. Make sure you discuss any questions you have with your health care provider.  Document Released: 01/25/2006 Document  Revised: 05/22/2017 Document Reviewed: 10/05/2016  [employer] Patient Education © 2019 [employer]      Exercising to Lose Weight  Exercising can help you to lose weight. In order to lose weight through exercise, you need to do vigorous-intensity exercise. You can tell that you are exercising with vigorous intensity if you are breathing very hard and fast and cannot hold a conversation while exercising.  Moderate-intensity exercise helps to maintain your current weight. You can tell that you are exercising at a moderate level if you have a higher heart rate and faster breathing, but you are still able to hold a conversation.  How often should I exercise?  Choose an activity that you enjoy and set realistic goals. Your health care provider can help you to make an activity plan that works for you. Exercise regularly as directed by your health care provider. This may include:  · Doing resistance training twice each week, such as:  ? Push-ups.  ? Sit-ups.  ? Lifting weights.  ? Using resistance bands.  · Doing a given intensity of exercise for a given amount of time. Choose from these options:  ? 150 minutes of moderate-intensity exercise every week.  ? 75 minutes of vigorous-intensity exercise every week.  ? A mix of moderate-intensity and vigorous-intensity exercise every week.    Children, pregnant women, people who are out of shape, people who are overweight, and older adults may need to consult a health care provider for individual recommendations. If you have any sort of medical condition, be sure to consult your health care provider before starting a new exercise program.  What are some activities that can help me to lose weight?  · Walking at a rate of at least 4.5 miles an hour.  · Jogging or running at a rate of 5 miles per hour.  · Biking at a rate of at least 10 miles per hour.  · Lap swimming.  · Roller-skating or in-line skating.  · Cross-country skiing.  · Vigorous competitive sports, such as  football, basketball, and soccer.  · Jumping rope.  · Aerobic dancing.  How can I be more active in my day-to-day activities?  · Use the stairs instead of the elevator.  · Take a walk during your lunch break.  · If you drive, park your car farther away from work or school.  · If you take public transportation, get off one stop early and walk the rest of the way.  · Make all of your phone calls while standing up and walking around.  · Get up, stretch, and walk around every 30 minutes throughout the day.  What guidelines should I follow while exercising?  · Do not exercise so much that you hurt yourself, feel dizzy, or get very short of breath.  · Consult your health care provider prior to starting a new exercise program.  · Wear comfortable clothes and shoes with good support.  · Drink plenty of water while you exercise to prevent dehydration or heat stroke. Body water is lost during exercise and must be replaced.  · Work out until you breathe faster and your heart beats faster.  This information is not intended to replace advice given to you by your health care provider. Make sure you discuss any questions you have with your health care provider.  Document Released: 01/20/2012 Document Revised: 05/25/2017 Document Reviewed: 05/21/2015  [employer] Patient Education © 2019 [employer]      Calorie Counting for Weight Loss  Calories are units of energy. Your body needs a certain amount of calories from food to keep you going throughout the day. When you eat more calories than your body needs, your body stores the extra calories as fat. When you eat fewer calories than your body needs, your body burns fat to get the energy it needs.  Calorie counting means keeping track of how many calories you eat and drink each day. Calorie counting can be helpful if you need to lose weight. If you make sure to eat fewer calories than your body needs, you should lose weight. Ask your health care provider what a healthy  weight is for you.  For calorie counting to work, you will need to eat the right number of calories in a day in order to lose a healthy amount of weight per week. A dietitian can help you determine how many calories you need in a day and will give you suggestions on how to reach your calorie goal.  · A healthy amount of weight to lose per week is usually 1-2 lb (0.5-0.9 kg). This usually means that your daily calorie intake should be reduced by 500-750 calories.  · Eating 1,200 - 1,500 calories per day can help most women lose weight.  · Eating 1,500 - 1,800 calories per day can help most men lose weight.    What is my plan?  My goal is to have __________ calories per day.  If I have this many calories per day, I should lose around __________ pounds per week.  What do I need to know about calorie counting?  In order to meet your daily calorie goal, you will need to:  · Find out how many calories are in each food you would like to eat. Try to do this before you eat.  · Decide how much of the food you plan to eat.  · Write down what you ate and how many calories it had. Doing this is called keeping a food log.    To successfully lose weight, it is important to balance calorie counting with a healthy lifestyle that includes regular activity. Aim for 150 minutes of moderate exercise (such as walking) or 75 minutes of vigorous exercise (such as running) each week.  Where do I find calorie information?    The number of calories in a food can be found on a Nutrition Facts label. If a food does not have a Nutrition Facts label, try to look up the calories online or ask your dietitian for help.  Remember that calories are listed per serving. If you choose to have more than one serving of a food, you will have to multiply the calories per serving by the amount of servings you plan to eat. For example, the label on a package of bread might say that a serving size is 1 slice and that there are 90 calories in a serving. If you  "eat 1 slice, you will have eaten 90 calories. If you eat 2 slices, you will have eaten 180 calories.  How do I keep a food log?  Immediately after each meal, record the following information in your food log:  · What you ate. Don't forget to include toppings, sauces, and other extras on the food.  · How much you ate. This can be measured in cups, ounces, or number of items.  · How many calories each food and drink had.  · The total number of calories in the meal.    Keep your food log near you, such as in a small notebook in your pocket, or use a mobile [employer] or website. Some programs will calculate calories for you and show you how many calories you have left for the day to meet your goal.  What are some calorie counting tips?  · Use your calories on foods and drinks that will fill you up and not leave you hungry:  ? Some examples of foods that fill you up are nuts and nut butters, vegetables, lean proteins, and high-fiber foods like whole grains. High-fiber foods are foods with more than 5 g fiber per serving.  ? Drinks such as sodas, specialty coffee drinks, alcohol, and juices have a lot of calories, yet do not fill you up.  · Eat nutritious foods and avoid empty calories. Empty calories are calories you get from foods or beverages that do not have many vitamins or protein, such as candy, sweets, and soda. It is better to have a nutritious high-calorie food (such as an avocado) than a food with few nutrients (such as a bag of chips).  · Know how many calories are in the foods you eat most often. This will help you calculate calorie counts faster.  · Pay attention to calories in drinks. Low-calorie drinks include water and unsweetened drinks.  · Pay attention to nutrition labels for \"low fat\" or \"fat free\" foods. These foods sometimes have the same amount of calories or more calories than the full fat versions. They also often have added sugar, starch, or salt, to make up for flavor that was removed with the " fat.  · Find a way of tracking calories that works for you. Get creative. Try different apps or programs if writing down calories does not work for you.  What are some portion control tips?  · Know how many calories are in a serving. This will help you know how many servings of a certain food you can have.  · Use a measuring cup to measure serving sizes. You could also try weighing out portions on a kitchen scale. With time, you will be able to estimate serving sizes for some foods.  · Take some time to put servings of different foods on your favorite plates, bowls, and cups so you know what a serving looks like.  · Try not to eat straight from a bag or box. Doing this can lead to overeating. Put the amount you would like to eat in a cup or on a plate to make sure you are eating the right portion.  · Use smaller plates, glasses, and bowls to prevent overeating.  · Try not to multitask (for example, watch TV or use your computer) while eating. If it is time to eat, sit down at a table and enjoy your food. This will help you to know when you are full. It will also help you to be aware of what you are eating and how much you are eating.  What are tips for following this plan?  Reading food labels  · Check the calorie count compared to the serving size. The serving size may be smaller than what you are used to eating.  · Check the source of the calories. Make sure the food you are eating is high in vitamins and protein and low in saturated and trans fats.  Shopping  · Read nutrition labels while you shop. This will help you make healthy decisions before you decide to purchase your food.  · Make a grocery list and stick to it.  Cooking  · Try to cook your favorite foods in a healthier way. For example, try baking instead of frying.  · Use low-fat dairy products.  Meal planning  · Use more fruits and vegetables. Half of your plate should be fruits and vegetables.  · Include lean proteins like poultry and fish.  How do I  count calories when eating out?  · Ask for smaller portion sizes.  · Consider sharing an entree and sides instead of getting your own entree.  · If you get your own entree, eat only half. Ask for a box at the beginning of your meal and put the rest of your entree in it so you are not tempted to eat it.  · If calories are listed on the menu, choose the lower calorie options.  · Choose dishes that include vegetables, fruits, whole grains, low-fat dairy products, and lean protein.  · Choose items that are boiled, broiled, grilled, or steamed. Stay away from items that are buttered, battered, fried, or served with cream sauce. Items labeled “crispy” are usually fried, unless stated otherwise.  · Choose water, low-fat milk, unsweetened iced tea, or other drinks without added sugar. If you want an alcoholic beverage, choose a lower calorie option such as a glass of wine or light beer.  · Ask for dressings, sauces, and syrups on the side. These are usually high in calories, so you should limit the amount you eat.  · If you want a salad, choose a garden salad and ask for grilled meats. Avoid extra toppings like fox, cheese, or fried items. Ask for the dressing on the side, or ask for olive oil and vinegar or lemon to use as dressing.  · Estimate how many servings of a food you are given. For example, a serving of cooked rice is ½ cup or about the size of half a baseball. Knowing serving sizes will help you be aware of how much food you are eating at restaurants. The list below tells you how big or small some common portion sizes are based on everyday objects:  ? 1 oz--4 stacked dice.  ? 3 oz--1 deck of cards.  ? 1 tsp--1 die.  ? 1 Tbsp--½ a ping-pong ball.  ? 2 Tbsp--1 ping-pong ball.  ? ½ cup--½ baseball.  ? 1 cup--1 baseball.  Summary  · Calorie counting means keeping track of how many calories you eat and drink each day. If you eat fewer calories than your body needs, you should lose weight.  · A healthy amount of  weight to lose per week is usually 1-2 lb (0.5-0.9 kg). This usually means reducing your daily calorie intake by 500-750 calories.  · The number of calories in a food can be found on a Nutrition Facts label. If a food does not have a Nutrition Facts label, try to look up the calories online or ask your dietitian for help.  · Use your calories on foods and drinks that will fill you up, and not on foods and drinks that will leave you hungry.  · Use smaller plates, glasses, and bowls to prevent overeating.  This information is not intended to replace advice given to you by your health care provider. Make sure you discuss any questions you have with your health care provider.  Document Released: 12/18/2006 Document Revised: 11/17/2017 Document Reviewed: 11/17/2017  [employer] Patient Education © 2019 [employer]    Serving Sizes  A serving size is a measured amount of food or drink, such as one slice of bread, that has an associated nutrient content. Knowing the serving size of a food or drink can help you determine how much of that food you should consume.  What is the size of one serving?  The size of one healthy serving depends on the food or drink. To determine a serving size, read the food label. If the food or drink does not have a food label, try to find serving size information online. Or, use the following to estimate the size of one adult serving:  Grain  1 slice bread. ½ bagel. ½ cup pasta.  Vegetable  ½ cup cooked or canned vegetables. 1 cup raw, leafy greens.  Fruit  ½ cup canned fruit. 1 medium fruit. ¼ cup dried fruit.  Meat and Other Protein Sources  1 oz meat, poultry, or fish. ¼ cup cooked beans. 1 egg. ¼ cup nuts or seeds. 1 Tbsp nut butter. ¼ cup tofu or tempeh. 2 Tbsp hummus.  Dairy  An individual container of yogurt (6-8 oz). 1 piece of cheese the size of your thumb (1 oz). 1 cup (8 oz) milk or milk alternative.  Fat  A piece the size of one dice. 1 tsp soft margarine. 1 Tbsp  mayonnaise. 1 tsp vegetable oil. 1 Tbsp regular salad dressing. 2 Tbsp low-fat salad dressing.  How many servings should I eat from each food group each day?  The following are the suggested number of servings to try and have every day from each food group. You can also look at your eating throughout the week and aim for meeting these requirements on most days for overall healthy eating.  Grain  6-8 servings. Try to have half of your grains from whole grains, such as whole wheat bread, corn tortillas, oatmeal, brown rice, whole wheat pasta, and bulgur.  Vegetable  At least 2½-3 servings.  Fruit  2 servings.  Meat and Other Protein Foods  5-6 servings. Aim to have lean proteins, such as chicken, turkey, fish, beans, or tofu.  Dairy  3 servings. Choose low-fat or nonfat if you are trying to control your weight.  Fat  2-3 servings.  Is a serving the same thing as a portion?  No. A portion is the actual amount you eat, which may be more than one serving. Knowing the specific serving size of a food and the nutritional information that goes with it can help you make a healthy decision on what size portion to eat.  What are some tips to help me learn healthy serving sizes?  · Check food labels for serving sizes. Many foods that come as a single portion actually contain multiple servings.  · Determine the serving size of foods you commonly eat and figure out how large a portion you usually eat.  · Measure the number of servings that can be held by the bowls, glasses, cups, and plates you typically use. For example, pour your breakfast cereal into your regular bowl and then pour it into a measuring cup.  · For 1-2 days, measure the serving sizes of all the foods you eat.  · Practice estimating serving sizes and determining how big your portions should be.  This information is not intended to replace advice given to you by your health care provider. Make sure you discuss any questions you have with your health care  provider.  Document Released: 09/15/2004 Document Revised: 08/12/2017 Document Reviewed: 03/17/2015  [employer] Interactive Patient Education © 2018 [employer]

## 2019-07-11 LAB
ALBUMIN SERPL-MCNC: 4.3 G/DL (ref 3.5–5.2)
ALBUMIN/GLOB SERPL: 1.5 G/DL
ALP SERPL-CCNC: 66 U/L (ref 39–117)
ALT SERPL-CCNC: 19 U/L (ref 1–33)
AST SERPL-CCNC: 19 U/L (ref 1–32)
BASOPHILS # BLD AUTO: 0.05 10*3/MM3 (ref 0–0.2)
BASOPHILS NFR BLD AUTO: 0.7 % (ref 0–1.5)
BILIRUB SERPL-MCNC: 0.7 MG/DL (ref 0.2–1.2)
BUN SERPL-MCNC: 26 MG/DL (ref 8–23)
BUN/CREAT SERPL: 32.5 (ref 7–25)
CALCIUM SERPL-MCNC: 10.1 MG/DL (ref 8.6–10.5)
CHLORIDE SERPL-SCNC: 107 MMOL/L (ref 98–107)
CHOLEST SERPL-MCNC: 215 MG/DL (ref 0–200)
CO2 SERPL-SCNC: 27 MMOL/L (ref 22–29)
CREAT SERPL-MCNC: 0.8 MG/DL (ref 0.57–1)
EOSINOPHIL # BLD AUTO: 0.08 10*3/MM3 (ref 0–0.4)
EOSINOPHIL NFR BLD AUTO: 1.1 % (ref 0.3–6.2)
ERYTHROCYTE [DISTWIDTH] IN BLOOD BY AUTOMATED COUNT: 14.6 % (ref 12.3–15.4)
GLOBULIN SER CALC-MCNC: 2.9 GM/DL
GLUCOSE SERPL-MCNC: 98 MG/DL (ref 65–99)
HCT VFR BLD AUTO: 49.8 % (ref 34–46.6)
HDLC SERPL-MCNC: 56 MG/DL (ref 40–60)
HGB BLD-MCNC: 15.1 G/DL (ref 12–15.9)
IMM GRANULOCYTES # BLD AUTO: 0.04 10*3/MM3 (ref 0–0.05)
IMM GRANULOCYTES NFR BLD AUTO: 0.5 % (ref 0–0.5)
LDLC SERPL CALC-MCNC: 140 MG/DL (ref 0–100)
LYMPHOCYTES # BLD AUTO: 1.75 10*3/MM3 (ref 0.7–3.1)
LYMPHOCYTES NFR BLD AUTO: 23.6 % (ref 19.6–45.3)
MCH RBC QN AUTO: 30.3 PG (ref 26.6–33)
MCHC RBC AUTO-ENTMCNC: 30.3 G/DL (ref 31.5–35.7)
MCV RBC AUTO: 99.8 FL (ref 79–97)
MONOCYTES # BLD AUTO: 0.76 10*3/MM3 (ref 0.1–0.9)
MONOCYTES NFR BLD AUTO: 10.3 % (ref 5–12)
NEUTROPHILS # BLD AUTO: 4.73 10*3/MM3 (ref 1.7–7)
NEUTROPHILS NFR BLD AUTO: 63.8 % (ref 42.7–76)
NRBC BLD AUTO-RTO: 0 /100 WBC (ref 0–0.2)
PLATELET # BLD AUTO: 250 10*3/MM3 (ref 140–450)
POTASSIUM SERPL-SCNC: 5.5 MMOL/L (ref 3.5–5.2)
PROT SERPL-MCNC: 7.2 G/DL (ref 6–8.5)
RBC # BLD AUTO: 4.99 10*6/MM3 (ref 3.77–5.28)
SODIUM SERPL-SCNC: 144 MMOL/L (ref 136–145)
TRIGL SERPL-MCNC: 94 MG/DL (ref 0–150)
TSH SERPL DL<=0.005 MIU/L-ACNC: 1.95 MIU/ML (ref 0.27–4.2)
VLDLC SERPL CALC-MCNC: 18.8 MG/DL
WBC # BLD AUTO: 7.41 10*3/MM3 (ref 3.4–10.8)

## 2019-07-18 DIAGNOSIS — R79.9 ABNORMAL BLOOD CHEMISTRY LEVEL: Primary | ICD-10-CM

## 2019-07-22 ENCOUNTER — TELEPHONE (OUTPATIENT)
Dept: CARDIOLOGY | Facility: CLINIC | Age: 74
End: 2019-07-22

## 2019-07-22 NOTE — TELEPHONE ENCOUNTER
----- Message from MARCELINO Willis sent at 7/9/2019  8:34 AM EDT -----    Scheduled to see  7/24    ----- Message -----  From: Aditi Louis APRN  Sent: 1/11/2019   3:39 PM  To: MARCELINO Willis      She needs repeat lipid panel on 7/10/19

## 2019-07-22 NOTE — TELEPHONE ENCOUNTER
I reviewed and she had a lipid panel completed 7/10/2019. Total cholesterol was elevated to 14 and .  PCP recommended diet and exercise and recheck in 6 months.

## 2019-07-24 ENCOUNTER — OFFICE VISIT (OUTPATIENT)
Dept: CARDIOLOGY | Facility: CLINIC | Age: 74
End: 2019-07-24

## 2019-07-24 VITALS
HEIGHT: 63 IN | HEART RATE: 81 BPM | WEIGHT: 275 LBS | BODY MASS INDEX: 48.73 KG/M2 | DIASTOLIC BLOOD PRESSURE: 90 MMHG | SYSTOLIC BLOOD PRESSURE: 130 MMHG

## 2019-07-24 DIAGNOSIS — I10 ESSENTIAL HYPERTENSION: ICD-10-CM

## 2019-07-24 DIAGNOSIS — I48.19 PERSISTENT ATRIAL FIBRILLATION (HCC): Primary | ICD-10-CM

## 2019-07-24 DIAGNOSIS — E66.01 MORBID OBESITY (HCC): ICD-10-CM

## 2019-07-24 DIAGNOSIS — G47.33 OBSTRUCTIVE SLEEP APNEA SYNDROME: ICD-10-CM

## 2019-07-24 DIAGNOSIS — E78.5 HYPERLIPIDEMIA, UNSPECIFIED HYPERLIPIDEMIA TYPE: ICD-10-CM

## 2019-07-24 DIAGNOSIS — I49.3 VENTRICULAR PREMATURE BEATS: ICD-10-CM

## 2019-07-24 PROCEDURE — 93000 ELECTROCARDIOGRAM COMPLETE: CPT | Performed by: INTERNAL MEDICINE

## 2019-07-24 PROCEDURE — 99214 OFFICE O/P EST MOD 30 MIN: CPT | Performed by: INTERNAL MEDICINE

## 2019-07-24 NOTE — PROGRESS NOTES
Date of Office Visit: 2019  Encounter Provider: Brit Mejia MD  Place of Service: [facility] CARDIOLOGY  Patient Name: Prema Hawthorne  :1945      Patient ID:  Prema Hawthorne is a 74 y.o. female is here for  followup for atrial fibrillation.         History of Present Illness    She came to [facility] in 2011. She came in with complaints of shortness of breath and fatigue. She was found to be in atrial fibrillation with a rapid ventricular response. An echocardiogram revealed an ejection fraction of 35%. She was admitted to the hospital and had a transesophageal echocardiogram and cardioversion. She came to our office for a stress test and was back in atrial fibrillation with rapid ventricular response. Heart catheterization in  was performed which showed a normal left main, normal left anterior descending artery, normal circumflex. The right coronary artery was codominant without significant disease. She was started on amiodarone at that time. She spontaneously cardioverted back to sinus rhythm. Repeat echo in May of 2011 showed that her LV function had returned to normal at 67%, and there was no significant valvular disease.       She does not know when she is in atrial fibrillation. She does have obstructive sleep apnea and follows with Dr. Tolentino. Her sleep apnea is well controlled.     Labs on 7/10/2019 show potassium 5.5, otherwise normal CMP, TSH 1.9, , HDL 56, normal CBC.  She had a nonischemic stress PET study done 3/19/2019.  She had an echocardiogram done 2019 showing ejection fraction 51-55% with moderate septal hypokinesis, mild concentric left ventricle hypertrophy, severe left atrial enlargement, moderate to severe right atrial enlargement.     She continues to exercise doing aquatic therapy and lose weight.  Her breathing is better.  She has chronic knee problems and sees Dr. Molina.  Her knees do better when she is doing aquatic  exercise.  She has had no tachycardia, dizziness or syncope.  She has no orthopnea or PND.  She is tolerating her medications well.    Past Medical History:   Diagnosis Date   • Atrial fibrillation (CMS/HCC)     with rapid ventricular response   • Cardiomyopathy (CMS/HCC)     nonischemic   • Essential hypertension    • Hyperlipidemia    • Morbid obesity (CMS/HCC)    • DAVID (obstructive sleep apnea)    • PVC (premature ventricular contraction)    • Sleep apnea 2011   • Systolic heart failure (CMS/HCC)    • Ventricular premature beats 3/15/2016         History reviewed. No pertinent surgical history.    Current Outpatient Medications on File Prior to Visit   Medication Sig Dispense Refill   • Coenzyme Q10 (COQ10 PO) Take 1 capsule by mouth daily.     • metoprolol succinate XL (TOPROL-XL) 100 MG 24 hr tablet Take 1 tablet by mouth 2 (Two) Times a Day. 180 tablet 1   • PRADAXA 150 MG capsu TAKE 1 CAPSULE TWICE A  capsule 1   • ramipril (ALTACE) 10 MG capsule Take 1 capsule by mouth Daily. 90 capsule 3   • Turmeric Curcumin 500 MG capsule Take 1 tablet by mouth Daily.       No current facility-administered medications on file prior to visit.        Social History     Socioeconomic History   • Marital status:      Spouse name: Not on file   • Number of children: Not on file   • Years of education: Not on file   • Highest education level: Not on file   Tobacco Use   • Smoking status: Never Smoker   • Smokeless tobacco: Never Used   Substance and Sexual Activity   • Alcohol use: Yes     Comment: caffeine use   • Drug use: No     Comment: caffeine use    • Sexual activity: No           Review of Systems   Constitution: Negative.   HENT: Negative for congestion.    Eyes: Negative for vision loss in left eye and vision loss in right eye.   Respiratory: Negative.  Negative for cough, hemoptysis, shortness of breath, sleep disturbances due to breathing, snoring, sputum production and wheezing.    Endocrine:  "Negative.    Hematologic/Lymphatic: Negative.    Skin: Negative for poor wound healing and rash.   Musculoskeletal: Negative for falls, gout, muscle cramps and myalgias.   Gastrointestinal: Negative for abdominal pain, diarrhea, dysphagia, hematemesis, melena, nausea and vomiting.   Neurological: Negative for excessive daytime sleepiness, dizziness, headaches, light-headedness, loss of balance, seizures and vertigo.   Psychiatric/Behavioral: Negative for depression and substance abuse. The patient is not nervous/anxious.        Procedures    ECG 12 Lead  Date/Time: 7/24/2019 1:12 PM  Performed by: Brit Mejia MD  Authorized by: Brit Mejia MD   Comparison: compared with previous ECG   Similar to previous ECG  Rhythm: atrial fibrillation  Ectopy: unifocal PVCs    Clinical impression: abnormal EKG                Objective:      Vitals:    07/24/19 1302   BP: 130/90   BP Location: Right arm   Patient Position: Sitting   Pulse: 81   Weight: 125 kg (275 lb)   Height: 160 cm (62.99\")     Body mass index is 48.73 kg/m².    Physical Exam   Constitutional: She is oriented to person, place, and time. She appears well-developed and well-nourished. No distress.   HENT:   Head: Normocephalic and atraumatic.   Eyes: Conjunctivae are normal. No scleral icterus.   Neck: Neck supple. No JVD present. Carotid bruit is not present. No thyromegaly present.   Cardiovascular: Normal rate, S1 normal, S2 normal, normal heart sounds and intact distal pulses. An irregularly irregular rhythm present.  No extrasystoles are present. PMI is not displaced. Exam reveals no gallop.   No murmur heard.  Pulses:       Carotid pulses are 2+ on the right side, and 2+ on the left side.       Radial pulses are 2+ on the right side, and 2+ on the left side.        Dorsalis pedis pulses are 2+ on the right side, and 2+ on the left side.        Posterior tibial pulses are 2+ on the right side, and 2+ on the left side.   Pulmonary/Chest: " Effort normal and breath sounds normal. No respiratory distress. She has no wheezes. She has no rhonchi. She has no rales. She exhibits no tenderness.   Abdominal: Soft. Bowel sounds are normal. She exhibits no distension, no abdominal bruit and no mass. There is no tenderness.   Musculoskeletal: She exhibits no edema or deformity.   Lymphadenopathy:     She has no cervical adenopathy.   Neurological: She is alert and oriented to person, place, and time. No cranial nerve deficit.   Skin: Skin is warm and dry. No rash noted. She is not diaphoretic. No cyanosis. No pallor. Nails show no clubbing.   Psychiatric: She has a normal mood and affect. Judgment normal.   Vitals reviewed.      Lab Review:       Assessment:      Diagnosis Plan   1. Persistent atrial fibrillation (CMS/HCC)     2. Essential hypertension     3. Hyperlipidemia, unspecified hyperlipidemia type     4. Obstructive sleep apnea syndrome     5. Ventricular premature beats     6. Morbid obesity (CMS/HCC)       1. Atrial fibrillation, rate controlled. She is anticoagulated with Pradaxa.   2. H/o Nonischemic cardiomyopathy. This was likely secondary to her atrial fibrillation with rapid ventricular response. She is on an ACE inhibitor and beta blocker. Her LV function had normalized by echo in May 2011.   3. Hypertension. Her blood pressure is controlled at home  4. Obstructive sleep apnea. She is compliant with her CPAP. This is well controlled.   5. Obesity. She is exercising regularly. Unfortunately, she has not lost weight. I encouraged her to continue with regular exercise.  6.  High LDL and high HDL     Plan:       See jaime in 1 year, no changes, continue to exercise and weight loss.  Her blood pressure at home is well controlled.    Atrial Fibrillation and Atrial Flutter  Assessment  • The patient has permanent atrial fibrillation  • This is non-valvular in etiology  • The patient's CHADS2-VASc score is 4  • A [id number] score of 2 or more is  considered a high risk for a thromboembolic event  • Dabigatran prescribed    Plan  • Continue in atrial fibrillation with rate control  • Continue dabigatran for antithrombotic therapy, bleeding issues discussed  • Continue beta blocker for rate control

## 2019-07-26 RX ORDER — METOPROLOL SUCCINATE 100 MG/1
100 TABLET, EXTENDED RELEASE ORAL 2 TIMES DAILY
Qty: 180 TABLET | Refills: 1 | Status: SHIPPED | OUTPATIENT
Start: 2019-07-26 | End: 2019-12-26 | Stop reason: SDUPTHER

## 2019-07-26 RX ORDER — METOPROLOL SUCCINATE 100 MG/1
100 TABLET, EXTENDED RELEASE ORAL 2 TIMES DAILY
Qty: 180 TABLET | Refills: 1 | Status: SHIPPED | OUTPATIENT
Start: 2019-07-26 | End: 2019-07-26 | Stop reason: SDUPTHER

## 2019-08-01 ENCOUNTER — RESULTS ENCOUNTER (OUTPATIENT)
Dept: FAMILY MEDICINE CLINIC | Facility: CLINIC | Age: 74
End: 2019-08-01

## 2019-08-01 DIAGNOSIS — R79.9 ABNORMAL BLOOD CHEMISTRY LEVEL: ICD-10-CM

## 2019-08-03 ENCOUNTER — DOCUMENTATION (OUTPATIENT)
Dept: FAMILY MEDICINE CLINIC | Facility: CLINIC | Age: 74
End: 2019-08-03

## 2019-08-03 ENCOUNTER — LAB (OUTPATIENT)
Dept: LAB | Facility: HOSPITAL | Age: 74
End: 2019-08-03

## 2019-08-03 DIAGNOSIS — R79.9 ABNORMAL BLOOD CHEMISTRY LEVEL: ICD-10-CM

## 2019-08-03 DIAGNOSIS — R79.9 ABNORMAL BLOOD CHEMISTRY LEVEL: Primary | ICD-10-CM

## 2019-08-03 LAB
ALBUMIN SERPL-MCNC: 4.4 G/DL (ref 3.5–5.2)
ALBUMIN/GLOB SERPL: 1.5 G/DL
ALP SERPL-CCNC: 62 U/L (ref 39–117)
ALT SERPL-CCNC: 18 U/L (ref 1–33)
AST SERPL-CCNC: 22 U/L (ref 1–32)
BASOPHILS # BLD AUTO: 0.09 10*3/MM3 (ref 0–0.2)
BASOPHILS NFR BLD AUTO: 1.3 % (ref 0–1.5)
BILIRUB SERPL-MCNC: 0.5 MG/DL (ref 0.2–1.2)
BUN SERPL-MCNC: 26 MG/DL (ref 8–23)
BUN/CREAT SERPL: 34.2 (ref 7–25)
CALCIUM SERPL-MCNC: 9.9 MG/DL (ref 8.6–10.5)
CHLORIDE SERPL-SCNC: 107 MMOL/L (ref 98–107)
CO2 SERPL-SCNC: 27.6 MMOL/L (ref 22–29)
CREAT SERPL-MCNC: 0.76 MG/DL (ref 0.57–1)
EOSINOPHIL # BLD AUTO: 0.08 10*3/MM3 (ref 0–0.4)
EOSINOPHIL NFR BLD AUTO: 1.2 % (ref 0.3–6.2)
ERYTHROCYTE [DISTWIDTH] IN BLOOD BY AUTOMATED COUNT: 14.6 % (ref 12.3–15.4)
GLOBULIN SER CALC-MCNC: 3 GM/DL
GLUCOSE SERPL-MCNC: 98 MG/DL (ref 65–99)
HCT VFR BLD AUTO: 50.5 % (ref 34–46.6)
HGB BLD-MCNC: 15.3 G/DL (ref 12–15.9)
IMM GRANULOCYTES # BLD AUTO: 0.04 10*3/MM3 (ref 0–0.05)
IMM GRANULOCYTES NFR BLD AUTO: 0.6 % (ref 0–0.5)
LYMPHOCYTES # BLD AUTO: 1.67 10*3/MM3 (ref 0.7–3.1)
LYMPHOCYTES NFR BLD AUTO: 24.2 % (ref 19.6–45.3)
MCH RBC QN AUTO: 30.2 PG (ref 26.6–33)
MCHC RBC AUTO-ENTMCNC: 30.3 G/DL (ref 31.5–35.7)
MCV RBC AUTO: 99.6 FL (ref 79–97)
MONOCYTES # BLD AUTO: 0.78 10*3/MM3 (ref 0.1–0.9)
MONOCYTES NFR BLD AUTO: 11.3 % (ref 5–12)
NEUTROPHILS # BLD AUTO: 4.23 10*3/MM3 (ref 1.7–7)
NEUTROPHILS NFR BLD AUTO: 61.4 % (ref 42.7–76)
NRBC BLD AUTO-RTO: 0 /100 WBC (ref 0–0.2)
PLATELET # BLD AUTO: 271 10*3/MM3 (ref 140–450)
POTASSIUM SERPL-SCNC: 6.1 MMOL/L (ref 3.5–5.2)
PROT SERPL-MCNC: 7.4 G/DL (ref 6–8.5)
RBC # BLD AUTO: 5.07 10*6/MM3 (ref 3.77–5.28)
SODIUM SERPL-SCNC: 144 MMOL/L (ref 136–145)
WBC # BLD AUTO: 6.89 10*3/MM3 (ref 3.4–10.8)

## 2019-08-03 PROCEDURE — 36415 COLL VENOUS BLD VENIPUNCTURE: CPT

## 2019-08-03 NOTE — PROGRESS NOTES
I received a call from [facility] lab today regarding a critical lab value of elevated potassium at 6.1. This CMP was originally drawn yesterday to follow up on a potassium of 5.5 on 7/10/19. I have placed an order for a stat recheck to be done today. I called the patient's cell phone and left a detailed message informing her of the K level and advising her to go to [facility] ER to get the CMP drawn and checked again stat. If still elevated, stay in ER for further evaluation. I tried calling her home number which may be disconnected as the number would not ring.

## 2019-08-06 ENCOUNTER — TELEPHONE (OUTPATIENT)
Dept: FAMILY MEDICINE CLINIC | Facility: CLINIC | Age: 74
End: 2019-08-06

## 2019-08-06 NOTE — TELEPHONE ENCOUNTER
Please let her know that they have not resulted yet for some reason.  I will have my MA look into it.

## 2019-08-06 NOTE — TELEPHONE ENCOUNTER
Called pt - pt states that [facility] called her to say that they lost her specimens. Pt going to repeat labs tomorrow at [facility].

## 2019-08-07 ENCOUNTER — APPOINTMENT (OUTPATIENT)
Dept: LAB | Facility: HOSPITAL | Age: 74
End: 2019-08-07

## 2019-08-07 DIAGNOSIS — E87.5 SERUM POTASSIUM ELEVATED: Primary | ICD-10-CM

## 2019-08-07 LAB
ALBUMIN SERPL-MCNC: 4.3 G/DL (ref 3.5–5.2)
ALBUMIN/GLOB SERPL: 1.2 G/DL
ALP SERPL-CCNC: 61 U/L (ref 39–117)
ALT SERPL W P-5'-P-CCNC: 21 U/L (ref 1–33)
ANION GAP SERPL CALCULATED.3IONS-SCNC: 10.5 MMOL/L (ref 5–15)
AST SERPL-CCNC: 24 U/L (ref 1–32)
BILIRUB SERPL-MCNC: 0.7 MG/DL (ref 0.2–1.2)
BUN BLD-MCNC: 26 MG/DL (ref 8–23)
BUN/CREAT SERPL: 28 (ref 7–25)
CALCIUM SPEC-SCNC: 10.2 MG/DL (ref 8.6–10.5)
CHLORIDE SERPL-SCNC: 104 MMOL/L (ref 98–107)
CO2 SERPL-SCNC: 26.5 MMOL/L (ref 22–29)
CREAT BLD-MCNC: 0.93 MG/DL (ref 0.57–1)
GFR SERPL CREATININE-BSD FRML MDRD: 59 ML/MIN/1.73
GLOBULIN UR ELPH-MCNC: 3.5 GM/DL
GLUCOSE BLD-MCNC: 111 MG/DL (ref 65–99)
POTASSIUM BLD-SCNC: 5.8 MMOL/L (ref 3.5–5.2)
PROT SERPL-MCNC: 7.8 G/DL (ref 6–8.5)
SODIUM BLD-SCNC: 141 MMOL/L (ref 136–145)

## 2019-08-07 PROCEDURE — 36415 COLL VENOUS BLD VENIPUNCTURE: CPT

## 2019-08-07 NOTE — PROGRESS NOTES
Please inform patient her potassium has decreased from 6.1-5.8.  This is still slightly elevated. Her blood pressure at her last visit with cardiology on July 24 was normal at 130/90.  The Ramipril she is on can make potassium levels higher.  I am considering cutting this in half to 5 mg a day.  I will message her cardiologist to confirm that she is okay with this as well.  Once I confirm the cardiologist is okay we will give her a call back.  I would also like to repeat her labs in 1 week.

## 2019-08-08 DIAGNOSIS — I50.22 SYSTOLIC CHF, CHRONIC (HCC): ICD-10-CM

## 2019-08-08 DIAGNOSIS — I10 ESSENTIAL HYPERTENSION: ICD-10-CM

## 2019-08-08 DIAGNOSIS — I10 ESSENTIAL HYPERTENSION: Primary | ICD-10-CM

## 2019-08-08 RX ORDER — RAMIPRIL 5 MG/1
5 CAPSULE ORAL DAILY
Qty: 30 CAPSULE | Refills: 5 | Status: SHIPPED | OUTPATIENT
Start: 2019-08-08 | End: 2019-08-08 | Stop reason: SDUPTHER

## 2019-08-08 RX ORDER — RAMIPRIL 5 MG/1
5 CAPSULE ORAL DAILY
Qty: 30 CAPSULE | Refills: 5 | Status: SHIPPED | OUTPATIENT
Start: 2019-08-08 | End: 2020-02-17

## 2019-08-12 ENCOUNTER — RESULTS ENCOUNTER (OUTPATIENT)
Dept: FAMILY MEDICINE CLINIC | Facility: CLINIC | Age: 74
End: 2019-08-12

## 2019-08-12 DIAGNOSIS — E87.5 SERUM POTASSIUM ELEVATED: ICD-10-CM

## 2019-08-16 LAB
ALBUMIN SERPL-MCNC: 4.1 G/DL (ref 3.5–5.2)
ALBUMIN/GLOB SERPL: 1.3 G/DL
ALP SERPL-CCNC: 64 U/L (ref 39–117)
ALT SERPL-CCNC: 29 U/L (ref 1–33)
AST SERPL-CCNC: 26 U/L (ref 1–32)
BILIRUB SERPL-MCNC: 0.7 MG/DL (ref 0.2–1.2)
BUN SERPL-MCNC: 20 MG/DL (ref 8–23)
BUN/CREAT SERPL: 25 (ref 7–25)
CALCIUM SERPL-MCNC: 9.9 MG/DL (ref 8.6–10.5)
CHLORIDE SERPL-SCNC: 105 MMOL/L (ref 98–107)
CO2 SERPL-SCNC: 26.3 MMOL/L (ref 22–29)
CREAT SERPL-MCNC: 0.8 MG/DL (ref 0.57–1)
GLOBULIN SER CALC-MCNC: 3.1 GM/DL
GLUCOSE SERPL-MCNC: 84 MG/DL (ref 65–99)
POTASSIUM SERPL-SCNC: 4.3 MMOL/L (ref 3.5–5.2)
PROT SERPL-MCNC: 7.2 G/DL (ref 6–8.5)
SODIUM SERPL-SCNC: 145 MMOL/L (ref 136–145)

## 2019-12-09 ENCOUNTER — OFFICE VISIT (OUTPATIENT)
Dept: SLEEP MEDICINE | Facility: HOSPITAL | Age: 74
End: 2019-12-09
Attending: INTERNAL MEDICINE

## 2019-12-09 VITALS
WEIGHT: 277 LBS | DIASTOLIC BLOOD PRESSURE: 90 MMHG | OXYGEN SATURATION: 98 % | BODY MASS INDEX: 47.29 KG/M2 | SYSTOLIC BLOOD PRESSURE: 141 MMHG | HEIGHT: 64 IN | HEART RATE: 94 BPM

## 2019-12-09 DIAGNOSIS — Z99.89 OSA ON CPAP: Primary | ICD-10-CM

## 2019-12-09 DIAGNOSIS — G47.34 SLEEP RELATED HYPOXIA: ICD-10-CM

## 2019-12-09 DIAGNOSIS — E66.01 MORBID OBESITY (HCC): ICD-10-CM

## 2019-12-09 DIAGNOSIS — I10 ESSENTIAL HYPERTENSION: ICD-10-CM

## 2019-12-09 DIAGNOSIS — G47.33 OSA ON CPAP: Primary | ICD-10-CM

## 2019-12-09 DIAGNOSIS — I48.11 LONGSTANDING PERSISTENT ATRIAL FIBRILLATION (HCC): ICD-10-CM

## 2019-12-09 PROCEDURE — G0463 HOSPITAL OUTPT CLINIC VISIT: HCPCS

## 2019-12-09 NOTE — PROGRESS NOTES
Sleep Disorder Follow Up    Patient Name: Prema Hawthorne  Age/Sex: 74 y.o. female  : 1945  MRN: [id number]    Date of Encounter Visit: 19  Referring Provider: Deloris Tolentino MD  Place of Service: [facility]  Patient Care Team:  Jennifer Taylor MD as PCP - General (Family Medicine)  Brit Mejia MD as Consulting Physician (Cardiology)  Christiane Goldman MD as Consulting Physician (Obstetrics and Gynecology)  Deloris Tolentino MD as Consulting Physician (Pulmonary Disease)  Chaz Molina MD as Consulting Physician (Orthopedic Surgery)    PROBLEM LIST:  1. Severe obstructive sleep apnea on CPAP  2. Morbid obesity  3. Systolic congestive heart failure with EF 35%  4. Chronic atrial fibrillation on Pradaxa  5. Sleep-related hypoxemia controlled on CPAP    History of Present Illness:  Prema Hawthorne is a 74 y.o. female who is here for follow up on severe DAVID and sleep related hypoxia on CPAP. Patient was last seen in the office on 12/10/18.    Since last visit, she is doing well,denies any changes in her medical history .    Patient uses machine every night with no complaints from the mask or the pressure.  Patient uses a nasal mask, which does fit well. Patient denies any air leak or dry mouth.   Patient's equipment supplier is [employer] . She replaces her mask every 3 months  Patient sleeps better and has a deeper sleep with the machine and feels more energy during the day time.  Currently on auto CPAP 12-18 cm H20.  H[place]ley Sleepiness Scale (ESS) is 5.  Compliance download was reviewed and documented below.  Weight is up about 5 pounds since last visit as reported by patient since she refused to be weighed today.  Other comorbidities include hypertension, atrial fibrillation, systolic congestive heart failure and obesity.     Review of Systems:   A ten-system review was conducted and was negative except for post nasal drainage.   Please refer to the follow up  sleep questionnaire page one for details.    Past Medical History:  Past medical, surgical, social, and family history, except as mentioned above, was unchanged from the last visit.     Past Medical History:   Diagnosis Date   • Atrial fibrillation (CMS/HCC)     with rapid ventricular response   • Cardiomyopathy (CMS/HCC)     nonischemic   • Essential hypertension    • Hyperlipidemia    • Morbid obesity (CMS/HCC)    • DAVID (obstructive sleep apnea)    • PVC (premature ventricular contraction)    • Sleep apnea 2011   • Systolic heart failure (CMS/HCC)    • Ventricular premature beats 3/15/2016       No past surgical history on file.    Home Medications:     Current Outpatient Medications:   •  Coenzyme Q10 (COQ10 PO), Take 1 capsule by mouth daily., Disp: , Rfl:   •  metoprolol succinate XL (TOPROL-XL) 100 MG 24 hr tablet, Take 1 tablet by mouth 2 (Two) Times a Day., Disp: 180 tablet, Rfl: 1  •  PRADAXA 150 MG capsu, TAKE 1 CAPSULE TWICE A DAY, Disp: 180 capsule, Rfl: 1  •  ramipril (ALTACE) 5 MG capsule, Take 1 capsule by mouth Daily., Disp: 30 capsule, Rfl: 5  •  Turmeric Curcumin 500 MG capsule, Take 1 tablet by mouth Daily., Disp: , Rfl:     Allergies:  Allergies   Allergen Reactions   • Codeine        Past Social History:  Social History     Socioeconomic History   • Marital status:      Spouse name: Not on file   • Number of children: Not on file   • Years of education: Not on file   • Highest education level: Not on file   Tobacco Use   • Smoking status: Never Smoker   • Smokeless tobacco: Never Used   Substance and Sexual Activity   • Alcohol use: Yes     Comment: caffeine use   • Drug use: No     Comment: caffeine use    • Sexual activity: Never       Past Family History:  Family History   Problem Relation Age of Onset   • Diabetes Mother    • Other Father         PACEMAKER PLACEMENT   • Heart disease Father    • Hypertension Father    • Heart disease Brother    • Heart disease Brother    •  "Hypertension Brother          Objective:   Done and documented on sleep disorders center physical examination sheet, please refer to hand written note on the chart for details about the other pertinent negative findings.    Vital Signs:   Visit Vitals  /90 (BP Location: Left arm, Patient Position: Sitting)   Pulse 94   Ht 161.9 cm (63.75\")   Wt 126 kg (277 lb)   SpO2 98%   BMI 47.92 kg/m²     Wt Readings from Last 3 Encounters:   12/09/19 126 kg (277 lb)   07/24/19 125 kg (275 lb)   06/28/19 127 kg (279 lb)          Physical Exam:   General: AAOx3. Normal mood and affect.   HEENT:  Moist mucous membranes.  Septum midline. Mallampati 4 airway. Extra tonsillar folds.   LUNGS: Non-labored breathing. CTAB. No wheezes.  HEART: irregular rhythm and normal rate. No murmur. Normal s1/s2  EXTREMITIES: 1+ edema. No cyanosis or clubbing. Normal gait using cane     Diagnostic Data:  NPSG on 04/21/2011 showed AHI of 49.5 treated with auto CPAP.    Compliance download from 11/5/19-12/4/19 showed 100% compliant with average use of 7 hours and 20 minutes. Residual AHI 4.5 on auto CPAP 12-18 cmH20 with mean pressure 12.5 cmH20 and 90th percentile pressure of 13.9 cmH20. No significant air leak.     Assessment and Plan:       ICD-10-CM ICD-9-CM   1. DAVID on CPAP G47.33 327.23    Z99.89 V46.8   2. Sleep related hypoxia G47.34 327.24   3. Essential hypertension I10 401.9   4. Longstanding persistent atrial fibrillation I48.11 427.31   5. Morbid obesity (CMS/[facility]) E66.01 278.01       Recommendations:     Patient is compliant with PAP therapy and has good control of DAVID with clinical and subjective improvement and plans to continue CPAP.   · Continue CPAP at 12-18 cmH20.  · Renew and replace supplies as indicated.  · Follow up with PCP on the BP control         No orders of the defined types were placed in this encounter.    No orders of the defined types were placed in this encounter.    Return in about 1 year (around " 12/9/2020).    Deloris Tolentino MD   [facility]   12/09/19  1:29 PM    Dictated utilizing Dragon dictation

## 2019-12-26 RX ORDER — METOPROLOL SUCCINATE 100 MG/1
100 TABLET, EXTENDED RELEASE ORAL 2 TIMES DAILY
Qty: 180 TABLET | Refills: 1 | Status: SHIPPED | OUTPATIENT
Start: 2019-12-26 | End: 2020-06-11

## 2020-01-06 RX ORDER — ATENOLOL 100 MG/1
TABLET ORAL
Qty: 180 CAPSULE | Refills: 0 | Status: SHIPPED | OUTPATIENT
Start: 2020-01-06 | End: 2020-03-02 | Stop reason: SDUPTHER

## 2020-01-10 ENCOUNTER — OFFICE VISIT (OUTPATIENT)
Dept: FAMILY MEDICINE CLINIC | Facility: CLINIC | Age: 75
End: 2020-01-10

## 2020-01-10 VITALS
RESPIRATION RATE: 18 BRPM | BODY MASS INDEX: 46.44 KG/M2 | OXYGEN SATURATION: 98 % | TEMPERATURE: 98.4 F | DIASTOLIC BLOOD PRESSURE: 92 MMHG | HEIGHT: 64 IN | WEIGHT: 272 LBS | SYSTOLIC BLOOD PRESSURE: 139 MMHG | HEART RATE: 87 BPM

## 2020-01-10 DIAGNOSIS — E78.5 HYPERLIPIDEMIA, UNSPECIFIED HYPERLIPIDEMIA TYPE: ICD-10-CM

## 2020-01-10 DIAGNOSIS — I50.22 SYSTOLIC CHF, CHRONIC (HCC): ICD-10-CM

## 2020-01-10 DIAGNOSIS — E67.8 OTHER SPECIFIED HYPERALIMENTATION: ICD-10-CM

## 2020-01-10 DIAGNOSIS — Z00.00 MEDICARE ANNUAL WELLNESS VISIT, SUBSEQUENT: Primary | ICD-10-CM

## 2020-01-10 DIAGNOSIS — I10 ESSENTIAL HYPERTENSION: ICD-10-CM

## 2020-01-10 DIAGNOSIS — I48.19 PERSISTENT ATRIAL FIBRILLATION (HCC): ICD-10-CM

## 2020-01-10 PROCEDURE — G0439 PPPS, SUBSEQ VISIT: HCPCS | Performed by: FAMILY MEDICINE

## 2020-01-10 PROCEDURE — 96160 PT-FOCUSED HLTH RISK ASSMT: CPT | Performed by: FAMILY MEDICINE

## 2020-01-10 NOTE — PROGRESS NOTES
The ABCs of the Annual Wellness Visit  Subsequent Medicare Wellness Visit    Chief Complaint   Patient presents with   • Medicare Wellness-subsequent       Subjective   History of Present Illness:  Prema Hawthorne is a 74 y.o. female who presents for a Subsequent Medicare Wellness Visit.    Hypertension/atrial fibrillation: Ramipril 5 mg a day.  Metoprolol  mg a day.  Blood pressure in the office today 139/92.  Home readings are 120s-130s/80s.  Denies chest pain shortness breath headache of interest.  Is more nervous in the office.  Is also on Pradaxa 50 mg a day.    DAVID on CPAP: Had follow-up early December with Dr. Tolentino.  Per records she is doing well on CPAP.  And is she is also compliant.  With cardiology is July 2020.    Skin lesion: Was referred to dermatology.  Patient reports this appointment went well and there was no concerns.  No change in lesion.    Taking supplements given by chiropractor.  Patient is not sure what is in them.    HEALTH RISK ASSESSMENT    Recent Hospitalizations:  No hospitalization(s) within the last year.    Current Medical Providers:  Patient Care Team:  Jennifer Taylor MD as PCP - General (Family Medicine)  Brit Mejia MD as Consulting Physician (Cardiology)  Christiane Goldman MD as Consulting Physician (Obstetrics and Gynecology)  Deloris Tolentino MD as Consulting Physician (Pulmonary Disease)  Chaz Molina MD as Consulting Physician (Orthopedic Surgery)    Smoking Status:  Social History     Tobacco Use   Smoking Status Never Smoker   Smokeless Tobacco Never Used       Alcohol Consumption:  Social History     Substance and Sexual Activity   Alcohol Use Yes    Comment: caffeine use       Depression Screen:   PHQ-2/PHQ-9 Depression Screening 1/10/2020   Little interest or pleasure in doing things 0   Feeling down, depressed, or hopeless 0   Trouble falling or staying asleep, or sleeping too much 0   Feeling tired or having little energy 0   Poor appetite or  overeating 0   Feeling bad about yourself - or that you are a failure or have let yourself or your family down 0   Trouble concentrating on things, such as reading the newspaper or watching television 0   Moving or speaking so slowly that other people could have noticed. Or the opposite - being so fidgety or restless that you have been moving around a lot more than usual 0   Thoughts that you would be better off dead, or of hurting yourself in some way 0   Total Score 0   If you checked off any problems, how difficult have these problems made it for you to do your work, take care of things at home, or get along with other people? Not difficult at all       Fall Risk Screen:  BIJAL Fall Risk Assessment was completed, and patient is at LOW risk for falls.Assessment completed on:1/10/2020    Health Habits and Functional and Cognitive Screening:  Functional & Cognitive Status 1/10/2020   Do you have difficulty preparing food and eating? No   Do you have difficulty bathing yourself, getting dressed or grooming yourself? No   Do you have difficulty using the toilet? No   Do you have difficulty moving around from place to place? No   Do you have trouble with steps or getting out of a bed or a chair? No   Current Diet Well Balanced Diet   Dental Exam Up to date   Eye Exam Up to date   Exercise (times per week) 3 times per week   Current Exercise Activities Include Walking   Do you need help using the phone?  No   Are you deaf or do you have serious difficulty hearing?  No   Do you need help with transportation? No   Do you need help shopping? No   Do you need help preparing meals?  No   Do you need help with housework?  No   Do you need help with laundry? No   Do you need help taking your medications? No   Do you need help managing money? No   Do you ever drive or ride in a car without wearing a seat belt? No   Have you felt unusual stress, anger or loneliness in the last month? Yes   Who do you live with? Spouse   If you  need help, do you have trouble finding someone available to you? No   Have you been bothered in the last four weeks by sexual problems? No   Do you have difficulty concentrating, remembering or making decisions? No         Does the patient have evidence of cognitive impairment? No    Asprin use counseling:Does not need ASA (and currently is not on it)    Age-appropriate Screening Schedule:  Refer to the list below for future screening recommendations based on patient's age, sex and/or medical conditions. Orders for these recommended tests are listed in the plan section. The patient has been provided with a written plan.    Health Maintenance   Topic Date Due   • TDAP/TD VACCINES (1 - Tdap) 07/24/1956   • ZOSTER VACCINE (1 of 2) 07/24/1994   • COLONOSCOPY  12/11/2017   • LIPID PANEL  07/10/2020   • MAMMOGRAM  12/01/2020   • INFLUENZA VACCINE  Completed          The following portions of the patient's history were reviewed and updated as appropriate: allergies, current medications, past family history, past medical history, past social history, past surgical history and problem list.    Outpatient Medications Prior to Visit   Medication Sig Dispense Refill   • Coenzyme Q10 (COQ10 PO) Take 1 capsule by mouth daily.     • metoprolol succinate XL (TOPROL-XL) 100 MG 24 hr tablet Take 1 tablet by mouth 2 (Two) Times a Day. 180 tablet 1   • PRADAXA 150 MG capsu TAKE 1 CAPSULE TWICE A  capsule 0   • ramipril (ALTACE) 5 MG capsule Take 1 capsule by mouth Daily. 30 capsule 5   • Turmeric Curcumin 500 MG capsule Take 1 tablet by mouth Daily.       No facility-administered medications prior to visit.        Patient Active Problem List   Diagnosis   • Atrial fibrillation (CMS/HCC)   • Hypertension   • Morbid obesity (CMS/HCC)   • DAVID on CPAP   • Ventricular premature beats   • Sleep related hypoxia   • Hyperlipidemia       Advanced Care Planning:  Patient does not have an advance directive - information provided to the  "patient today    Review of Systems   Constitutional: Negative for fever.   HENT: Negative for nosebleeds and trouble swallowing.    Eyes: Negative for visual disturbance.   Respiratory: Negative for choking and stridor.    Cardiovascular: Negative for chest pain.   Gastrointestinal: Negative for blood in stool.   Endocrine: Negative for polydipsia.   Genitourinary: Negative for genital sores and hematuria.   Musculoskeletal: Negative for joint swelling.   Skin: Negative for color change and rash.   Allergic/Immunologic: Negative for immunocompromised state.   Neurological: Negative for seizures, facial asymmetry and speech difficulty.   Hematological: Negative for adenopathy.   Psychiatric/Behavioral: Negative for behavioral problems, self-injury and suicidal ideas.       Compared to one year ago, the patient feels her physical health is the same.  Compared to one year ago, the patient feels her mental health is the same.    Reviewed chart for potential of high risk medication in the elderly: yes  Reviewed chart for potential of harmful drug interactions in the elderly:yes    Objective         Vitals:    01/10/20 1030   BP: 139/92   Pulse: 87   Resp: 18   Temp: 98.4 °F (36.9 °C)   TempSrc: Oral   SpO2: 98%   Weight: 123 kg (272 lb)   Height: 161.9 cm (63.75\")   PainSc: 0-No pain       Body mass index is 47.06 kg/m².  Discussed the patient's BMI with her. The BMI is above average; BMI management plan is completed.    Physical Exam   Constitutional: She is oriented to person, place, and time. She appears well-developed and well-nourished.   HENT:   Head: Normocephalic and atraumatic.   Right Ear: External ear normal.   Left Ear: External ear normal.   Nose: Nose normal.   Mouth/Throat: Oropharynx is clear and moist.   Eyes: Pupils are equal, round, and reactive to light. Conjunctivae and EOM are normal.   Neck: Normal range of motion.   Cardiovascular: Normal rate and regular rhythm.   Pulmonary/Chest: Effort normal " and breath sounds normal. No stridor. No respiratory distress.   Abdominal: Soft. Bowel sounds are normal. She exhibits no distension. There is no tenderness.   Neurological: She is alert and oriented to person, place, and time.   Skin: Skin is warm.   Psychiatric: She has a normal mood and affect. Her behavior is normal.             Assessment/Plan   Medicare Risks and Personalized Health Plan  CMS Preventative Services Quick Reference  Advance Directive Discussion  Cardiovascular risk  Dementia/Memory   Diabetic Lab Screening   Fall Risk  Immunizations Discussed/Encouraged (specific immunizations; Td and Shingrix )  Obesity/Overweight   Polypharmacy    The above risks/problems have been discussed with the patient.  Pertinent information has been shared with the patient in the After Visit Summary.  Follow up plans and orders are seen below in the Assessment/Plan Section.    Diagnoses and all orders for this visit:    1. Medicare annual wellness visit, subsequent (Primary)  -     Vitamin B12 & Folate  -     Vitamin D 25 Hydroxy    2. Essential hypertension  -     CBC & Differential  -     Comprehensive Metabolic Panel  -     TSH Rfx On Abnormal To Free T4    3. Systolic CHF, chronic (CMS/HCC)  -     CBC & Differential  -     Comprehensive Metabolic Panel  -     TSH Rfx On Abnormal To Free T4    4. Hyperlipidemia, unspecified hyperlipidemia type  -     CBC & Differential  -     Comprehensive Metabolic Panel  -     TSH Rfx On Abnormal To Free T4    5. Persistent atrial fibrillation  -     CBC & Differential  -     Comprehensive Metabolic Panel  -     TSH Rfx On Abnormal To Free T4    6. Other specified hyperalimentation   -     Vitamin D 25 Hydroxy      Follow Up:  Return in about 6 months (around 7/10/2020).     An After Visit Summary and PPPS were given to the patient.

## 2020-01-17 LAB
25(OH)D3+25(OH)D2 SERPL-MCNC: 27.3 NG/ML (ref 30–100)
ALBUMIN SERPL-MCNC: 4.4 G/DL (ref 3.5–5.2)
ALBUMIN/GLOB SERPL: 1.3 G/DL
ALP SERPL-CCNC: 64 U/L (ref 39–117)
ALT SERPL-CCNC: 21 U/L (ref 1–33)
AST SERPL-CCNC: 25 U/L (ref 1–32)
BASOPHILS # BLD AUTO: 0.06 10*3/MM3 (ref 0–0.2)
BASOPHILS NFR BLD AUTO: 0.9 % (ref 0–1.5)
BILIRUB SERPL-MCNC: 0.5 MG/DL (ref 0.2–1.2)
BUN SERPL-MCNC: 20 MG/DL (ref 8–23)
BUN/CREAT SERPL: 21.7 (ref 7–25)
CALCIUM SERPL-MCNC: 10 MG/DL (ref 8.6–10.5)
CHLORIDE SERPL-SCNC: 102 MMOL/L (ref 98–107)
CO2 SERPL-SCNC: 25.9 MMOL/L (ref 22–29)
CREAT SERPL-MCNC: 0.92 MG/DL (ref 0.57–1)
EOSINOPHIL # BLD AUTO: 0.06 10*3/MM3 (ref 0–0.4)
EOSINOPHIL NFR BLD AUTO: 0.9 % (ref 0.3–6.2)
ERYTHROCYTE [DISTWIDTH] IN BLOOD BY AUTOMATED COUNT: 13.6 % (ref 12.3–15.4)
FOLATE SERPL-MCNC: 18.5 NG/ML (ref 4.78–24.2)
GLOBULIN SER CALC-MCNC: 3.3 GM/DL
GLUCOSE SERPL-MCNC: 98 MG/DL (ref 65–99)
HCT VFR BLD AUTO: 44.5 % (ref 34–46.6)
HGB BLD-MCNC: 15.2 G/DL (ref 12–15.9)
IMM GRANULOCYTES # BLD AUTO: 0.04 10*3/MM3 (ref 0–0.05)
IMM GRANULOCYTES NFR BLD AUTO: 0.6 % (ref 0–0.5)
LYMPHOCYTES # BLD AUTO: 1.76 10*3/MM3 (ref 0.7–3.1)
LYMPHOCYTES NFR BLD AUTO: 25.2 % (ref 19.6–45.3)
MCH RBC QN AUTO: 31.7 PG (ref 26.6–33)
MCHC RBC AUTO-ENTMCNC: 34.2 G/DL (ref 31.5–35.7)
MCV RBC AUTO: 92.9 FL (ref 79–97)
MONOCYTES # BLD AUTO: 0.7 10*3/MM3 (ref 0.1–0.9)
MONOCYTES NFR BLD AUTO: 10 % (ref 5–12)
NEUTROPHILS # BLD AUTO: 4.37 10*3/MM3 (ref 1.7–7)
NEUTROPHILS NFR BLD AUTO: 62.4 % (ref 42.7–76)
NRBC BLD AUTO-RTO: 0 /100 WBC (ref 0–0.2)
PLATELET # BLD AUTO: 287 10*3/MM3 (ref 140–450)
POTASSIUM SERPL-SCNC: 5.6 MMOL/L (ref 3.5–5.2)
PROT SERPL-MCNC: 7.7 G/DL (ref 6–8.5)
RBC # BLD AUTO: 4.79 10*6/MM3 (ref 3.77–5.28)
SODIUM SERPL-SCNC: 141 MMOL/L (ref 136–145)
TSH SERPL DL<=0.005 MIU/L-ACNC: 2.22 UIU/ML (ref 0.27–4.2)
VIT B12 SERPL-MCNC: 436 PG/ML (ref 211–946)
WBC # BLD AUTO: 6.99 10*3/MM3 (ref 3.4–10.8)

## 2020-01-19 NOTE — PROGRESS NOTES
All labs essentially normal except potassium slightly elevated. Recheck BMP on Monday 1/20/2020. Also Vit D a little low; take Vitamin D supplements 1000 U daily and recheck Vit D in 3 months.

## 2020-01-20 DIAGNOSIS — E87.5 SERUM POTASSIUM ELEVATED: Primary | ICD-10-CM

## 2020-01-20 DIAGNOSIS — R79.89 LOW VITAMIN D LEVEL: ICD-10-CM

## 2020-01-20 DIAGNOSIS — E55.9 VITAMIN D DEFICIENCY: ICD-10-CM

## 2020-01-21 ENCOUNTER — RESULTS ENCOUNTER (OUTPATIENT)
Dept: FAMILY MEDICINE CLINIC | Facility: CLINIC | Age: 75
End: 2020-01-21

## 2020-01-21 DIAGNOSIS — E87.5 SERUM POTASSIUM ELEVATED: ICD-10-CM

## 2020-01-22 LAB
BUN SERPL-MCNC: 25 MG/DL (ref 8–27)
BUN/CREAT SERPL: 30 (ref 12–28)
CALCIUM SERPL-MCNC: 10 MG/DL (ref 8.7–10.3)
CHLORIDE SERPL-SCNC: 102 MMOL/L (ref 96–106)
CO2 SERPL-SCNC: 25 MMOL/L (ref 20–29)
CREAT SERPL-MCNC: 0.83 MG/DL (ref 0.57–1)
GLUCOSE SERPL-MCNC: 92 MG/DL (ref 65–99)
POTASSIUM SERPL-SCNC: 4.6 MMOL/L (ref 3.5–5.2)
SODIUM SERPL-SCNC: 142 MMOL/L (ref 134–144)

## 2020-02-17 DIAGNOSIS — I10 ESSENTIAL HYPERTENSION: ICD-10-CM

## 2020-02-17 DIAGNOSIS — I50.22 SYSTOLIC CHF, CHRONIC (HCC): ICD-10-CM

## 2020-02-17 RX ORDER — RAMIPRIL 5 MG/1
5 CAPSULE ORAL DAILY
Qty: 30 CAPSULE | Refills: 5 | Status: SHIPPED | OUTPATIENT
Start: 2020-02-17 | End: 2020-07-27 | Stop reason: SDUPTHER

## 2020-03-02 RX ORDER — DABIGATRAN ETEXILATE 150 MG/1
150 CAPSULE ORAL 2 TIMES DAILY
Qty: 180 CAPSULE | Refills: 1 | Status: SHIPPED | OUTPATIENT
Start: 2020-03-02 | End: 2020-07-27 | Stop reason: SDUPTHER

## 2020-03-09 ENCOUNTER — APPOINTMENT (OUTPATIENT)
Dept: WOMENS IMAGING | Facility: HOSPITAL | Age: 75
End: 2020-03-09

## 2020-03-09 PROCEDURE — 77067 SCR MAMMO BI INCL CAD: CPT | Performed by: RADIOLOGY

## 2020-03-09 PROCEDURE — 77063 BREAST TOMOSYNTHESIS BI: CPT | Performed by: RADIOLOGY

## 2020-04-25 ENCOUNTER — RESULTS ENCOUNTER (OUTPATIENT)
Dept: FAMILY MEDICINE CLINIC | Facility: CLINIC | Age: 75
End: 2020-04-25

## 2020-04-25 DIAGNOSIS — E55.9 VITAMIN D DEFICIENCY: ICD-10-CM

## 2020-06-11 RX ORDER — METOPROLOL SUCCINATE 100 MG/1
TABLET, EXTENDED RELEASE ORAL
Qty: 180 TABLET | Refills: 0 | Status: SHIPPED | OUTPATIENT
Start: 2020-06-11 | End: 2020-07-27 | Stop reason: SDUPTHER

## 2020-07-10 ENCOUNTER — OFFICE VISIT (OUTPATIENT)
Dept: FAMILY MEDICINE CLINIC | Facility: CLINIC | Age: 75
End: 2020-07-10

## 2020-07-10 VITALS
HEART RATE: 83 BPM | DIASTOLIC BLOOD PRESSURE: 74 MMHG | SYSTOLIC BLOOD PRESSURE: 125 MMHG | BODY MASS INDEX: 47.29 KG/M2 | HEIGHT: 64 IN | TEMPERATURE: 97.7 F | OXYGEN SATURATION: 97 % | WEIGHT: 277 LBS | RESPIRATION RATE: 16 BRPM

## 2020-07-10 DIAGNOSIS — I48.11 LONGSTANDING PERSISTENT ATRIAL FIBRILLATION (HCC): Primary | ICD-10-CM

## 2020-07-10 DIAGNOSIS — E55.9 VITAMIN D DEFICIENCY: ICD-10-CM

## 2020-07-10 DIAGNOSIS — I10 ESSENTIAL HYPERTENSION: ICD-10-CM

## 2020-07-10 PROCEDURE — 99213 OFFICE O/P EST LOW 20 MIN: CPT | Performed by: FAMILY MEDICINE

## 2020-07-10 NOTE — PROGRESS NOTES
Subjective   Prema Hawthorne is a 74 y.o. female.     History of Present Illness     74-year-old female presents today for 6-month follow-up on hypertension/atrial fibrillation.    Ramipril 5 mg a day, metoprolol  mg a day.  Blood pressure in the office today 125/74.  Home readings are same.  Denies chest pain shortness of breath headache with vision changes.  Usually has a high blood pressure reading in the office due to anxiety.  Is also on Pradaxa 50 mg a day.    DAVID on CPAP: Follows with sleep specialist; next due December 2020.    Next follow-up with cardiology is at the end of this month.    Labs about last visit which were all essentially normal except potassium slightly elevated.  Vitamin D was little bit low.  Advised to recheck vitamin D level in 3 months after taking supplements.  BMP recheck showed normal potassium.  Due for recheck of vitamin D today.    Colon cancer screening: will call with name of GI doctor she wants to see.     The following portions of the patient's history were reviewed and updated as appropriate: allergies, current medications, past family history, past medical history, past social history, past surgical history and problem list.    Review of Systems   Constitutional: Negative for chills and fever.   HENT: Negative for congestion.    Respiratory: Negative for cough and shortness of breath.    Cardiovascular: Negative for chest pain, palpitations and leg swelling.   Gastrointestinal: Negative for abdominal pain, diarrhea and vomiting.       Objective   Physical Exam   Constitutional: She appears well-developed and well-nourished.   HENT:   Head: Normocephalic and atraumatic.   Mouth/Throat: Uvula is midline, oropharynx is clear and moist and mucous membranes are normal.   Eyes: Pupils are equal, round, and reactive to light. EOM are normal.   Cardiovascular: Normal rate and regular rhythm.   No murmur heard.  Pulmonary/Chest: Effort normal and breath sounds normal. No stridor.  No respiratory distress. She has no wheezes. She has no rales.   Neurological: She is alert.   Skin: Skin is warm.   Psychiatric: She has a normal mood and affect. Her behavior is normal.               Assessment/Plan     Prema was seen today for hypertension and hyperlipidemia.    Diagnoses and all orders for this visit:    Longstanding persistent atrial fibrillation (CMS/HCC)  -     Comprehensive Metabolic Panel    Essential hypertension  -     Comprehensive Metabolic Panel    Vitamin D deficiency  -     Vitamin D 25 Hydroxy    Other orders  -     Cancel: Pneumococcal Polysaccharide Vaccine 23-Valent Greater Than or Equal To 1yo Subcutaneous / IM          RTC in 6 months for MWV or sooner if needed.

## 2020-07-11 LAB
25(OH)D3+25(OH)D2 SERPL-MCNC: 30.6 NG/ML (ref 30–100)
ALBUMIN SERPL-MCNC: 4 G/DL (ref 3.7–4.7)
ALBUMIN/GLOB SERPL: 1.4 {RATIO} (ref 1.2–2.2)
ALP SERPL-CCNC: 56 IU/L (ref 39–117)
ALT SERPL-CCNC: 22 IU/L (ref 0–32)
AST SERPL-CCNC: 26 IU/L (ref 0–40)
BILIRUB SERPL-MCNC: 0.6 MG/DL (ref 0–1.2)
BUN SERPL-MCNC: 20 MG/DL (ref 8–27)
BUN/CREAT SERPL: 24 (ref 12–28)
CALCIUM SERPL-MCNC: 9.6 MG/DL (ref 8.7–10.3)
CHLORIDE SERPL-SCNC: 104 MMOL/L (ref 96–106)
CO2 SERPL-SCNC: 24 MMOL/L (ref 20–29)
CREAT SERPL-MCNC: 0.85 MG/DL (ref 0.57–1)
GLOBULIN SER CALC-MCNC: 2.9 G/DL (ref 1.5–4.5)
GLUCOSE SERPL-MCNC: 80 MG/DL (ref 65–99)
POTASSIUM SERPL-SCNC: 4.4 MMOL/L (ref 3.5–5.2)
PROT SERPL-MCNC: 6.9 G/DL (ref 6–8.5)
SODIUM SERPL-SCNC: 141 MMOL/L (ref 134–144)

## 2020-07-27 ENCOUNTER — OFFICE VISIT (OUTPATIENT)
Dept: CARDIOLOGY | Facility: CLINIC | Age: 75
End: 2020-07-27

## 2020-07-27 VITALS
SYSTOLIC BLOOD PRESSURE: 124 MMHG | BODY MASS INDEX: 47.29 KG/M2 | WEIGHT: 277 LBS | HEIGHT: 64 IN | HEART RATE: 82 BPM | DIASTOLIC BLOOD PRESSURE: 70 MMHG

## 2020-07-27 DIAGNOSIS — G47.33 OSA ON CPAP: ICD-10-CM

## 2020-07-27 DIAGNOSIS — Z99.89 OSA ON CPAP: ICD-10-CM

## 2020-07-27 DIAGNOSIS — I50.22 SYSTOLIC CHF, CHRONIC (HCC): ICD-10-CM

## 2020-07-27 DIAGNOSIS — I10 ESSENTIAL HYPERTENSION: ICD-10-CM

## 2020-07-27 DIAGNOSIS — E78.2 MIXED HYPERLIPIDEMIA: ICD-10-CM

## 2020-07-27 DIAGNOSIS — I49.3 VENTRICULAR PREMATURE BEATS: ICD-10-CM

## 2020-07-27 DIAGNOSIS — I48.11 LONGSTANDING PERSISTENT ATRIAL FIBRILLATION (HCC): Primary | ICD-10-CM

## 2020-07-27 DIAGNOSIS — E66.01 MORBID OBESITY (HCC): ICD-10-CM

## 2020-07-27 PROCEDURE — 93000 ELECTROCARDIOGRAM COMPLETE: CPT | Performed by: NURSE PRACTITIONER

## 2020-07-27 PROCEDURE — 99214 OFFICE O/P EST MOD 30 MIN: CPT | Performed by: NURSE PRACTITIONER

## 2020-07-27 RX ORDER — RAMIPRIL 5 MG/1
5 CAPSULE ORAL DAILY
Qty: 90 CAPSULE | Refills: 3 | Status: SHIPPED | OUTPATIENT
Start: 2020-07-27 | End: 2021-02-04

## 2020-07-27 RX ORDER — METOPROLOL SUCCINATE 100 MG/1
100 TABLET, EXTENDED RELEASE ORAL 2 TIMES DAILY
Qty: 180 TABLET | Refills: 3 | Status: SHIPPED | OUTPATIENT
Start: 2020-07-27 | End: 2021-07-23

## 2020-07-27 RX ORDER — DABIGATRAN ETEXILATE 150 MG/1
150 CAPSULE ORAL 2 TIMES DAILY
Qty: 180 CAPSULE | Refills: 3 | Status: SHIPPED | OUTPATIENT
Start: 2020-07-27 | End: 2020-11-11

## 2020-07-27 NOTE — PROGRESS NOTES
Date of Office Visit: 2020  Encounter Provider: MARCELINO Plascencia  Place of Service: [facility] CARDIOLOGY  Patient Name: Prema Hawthorne  :1945  Primary Cardiologist: Dr. Brit Mejia    Chief Complaint   Patient presents with   • Atrial Fibrillation   • Annual Exam   • Follow-up   :     Dear Dr. Taylor,     HPI: Prema Hawthorne is a pleasant 75 y.o. female who presents today for annual cardiac follow up. She has a known history of hypertension, obesity, and obstructive sleep apnea (compliant with CPAP).      In , she was diagnosed with atrial fibrillation.  Echocardiogram at that time revealed an EF of 35% and cardiac catheterization showed normal coronary arteries.  Elective cardioversion was completed, but then she developed atrial fibrillation again. In May 2011, the echocardiogram was repeated and her EF improved to 67%.  She has remained in persistent atrial fibrillation that is treated with metoprolol and dabigatran for stroke prevention.    In 2019, she was experiencing dyspnea and diaphoresis.  She reported variability in her heart rate at home which was running 40s to 117 bpm.  She was noted to have frequent PVCs most likely contributing to the abnormal calculation of her heart rate.  A Holter monitor was worn 19 which showed atrial fibrillation with average heart rate 90, minimum 57, maximum 152 and frequent PVCs.  I increased her metoprolol to 100 mg twice per day.     On 2019 an echocardiogram was obtained with the following results: EF 51-55%, left ventricle mild to moderately dilated, mild concentric hypertrophy, moderate septal hypokinesis, left atrium severely dilated, right atrium moderately to severely dilated, trace aortic, mitral, pulmonic, and tricuspid regurgitation. On 3/19/2019 a cardiac PET scan was completed which showed no evidence of ischemia.    In 2019, she followed up in the office with Dr. Mejia and was  doing well at that time.    She presents today for follow-up.  She reports that her ramipril was decreased because of hyperkalemia.  Her blood pressure and heart rate are both normal today. She denies chest pain, dyspnea, PND, orthopnea, cough, edema, palpitations, dizziness, or syncope. She suffers from bilateral knee pain and has recently been undergoing knee injections.  She said she is not ready for surgery at this point.  She has a history of sleep apnea and is compliant with her CPAP machine. She denies any black stools, blood in urine, or other bleeding.     I reviewed her last blood work from 7/10/2020 which included a CMP which was normal.  On 1/16/2020 CBC was normal.  On 7/10/2019 total cholesterol 215, triglycerides 94, HDL 56, and .      Past Medical History:   Diagnosis Date   • Atrial fibrillation (CMS/HCC)     with rapid ventricular response   • Cardiomyopathy (CMS/HCC)     nonischemic   • Essential hypertension    • Hyperlipidemia    • Morbid obesity (CMS/HCC)    • DAVID (obstructive sleep apnea) 2011    CPAP nightly   • PVC (premature ventricular contraction) 03/25/2016   • Systolic heart failure (CMS/HCC)        History reviewed. No pertinent surgical history.    Social History     Socioeconomic History   • Marital status:      Spouse name: Not on file   • Number of children: Not on file   • Years of education: Not on file   • Highest education level: Not on file   Tobacco Use   • Smoking status: Never Smoker   • Smokeless tobacco: Never Used   Substance and Sexual Activity   • Alcohol use: Yes     Comment: Occ//caffeine use: 2 cups daily   • Drug use: No     Comment: caffeine use    • Sexual activity: Never       Family History   Problem Relation Age of Onset   • Diabetes Mother    • Other Father         PACEMAKER PLACEMENT   • Heart disease Father    • Hypertension Father    • Heart disease Brother    • Heart disease Brother    • Hypertension Brother        The following portion of  the patient's history were reviewed and updated as appropriate: past medical history, past surgical history, past social history, past family history, allergies, current medications, and problem list.    Review of Systems   Constitution: Negative for chills, diaphoresis, fever, malaise/fatigue, night sweats, weight gain and weight loss.   HENT: Negative for hearing loss, nosebleeds, sore throat and tinnitus.    Eyes: Negative for blurred vision, double vision, pain and visual disturbance.   Cardiovascular: Negative for chest pain, claudication, cyanosis, dyspnea on exertion, irregular heartbeat, leg swelling, near-syncope, orthopnea, palpitations, paroxysmal nocturnal dyspnea and syncope.   Respiratory: Negative for cough, hemoptysis, shortness of breath, snoring and wheezing.    Endocrine: Negative for cold intolerance, heat intolerance and polyuria.   Hematologic/Lymphatic: Negative for bleeding problem. Does not bruise/bleed easily.   Skin: Negative for color change, dry skin, flushing and itching.   Musculoskeletal: Positive for joint pain (knee). Negative for falls, joint swelling, muscle cramps, muscle weakness and myalgias.   Gastrointestinal: Negative for abdominal pain, constipation, heartburn, melena, nausea and vomiting.   Genitourinary: Negative for dysuria and hematuria.   Neurological: Negative for excessive daytime sleepiness, dizziness, light-headedness, loss of balance, numbness, paresthesias, seizures and vertigo.   Psychiatric/Behavioral: Negative for altered mental status, depression, memory loss and substance abuse. The patient does not have insomnia and is not nervous/anxious.    Allergic/Immunologic: Negative for environmental allergies.       Allergies   Allergen Reactions   • Codeine          Current Outpatient Medications:   •  Coenzyme Q10 (COQ10 PO), Take 1 capsule by mouth daily., Disp: , Rfl:   •  dabigatran etexilate (Pradaxa) 150 MG capsu, Take 1 capsule by mouth 2 (Two) Times a Day.,  "Disp: 180 capsule, Rfl: 3  •  metoprolol succinate XL (TOPROL-XL) 100 MG 24 hr tablet, Take 1 tablet by mouth 2 (Two) Times a Day., Disp: 180 tablet, Rfl: 3  •  ramipril (ALTACE) 5 MG capsule, Take 1 capsule by mouth Daily., Disp: 90 capsule, Rfl: 3  •  Turmeric Curcumin 500 MG capsule, Take 1 tablet by mouth Daily., Disp: , Rfl:         Objective:     Vitals:    07/27/20 1130 07/27/20 1131   BP: 120/70 124/70   BP Location: Left arm Right arm   Pulse: 82    Weight: 126 kg (277 lb)    Height: 161.9 cm (63.75\")      Body mass index is 47.92 kg/m².    PHYSICAL EXAM:    Vitals Reviewed.   General Appearance: No acute distress, well developed and well nourished.  Obese.  Eyes: Conjunctiva and lids: No erythema, swelling, or discharge. Sclera non-icteric.   HENT: Atraumatic, normocephalic. External eyes, ears, and nose normal. No hearing loss noted. Mucous membranes normal. Lips not cyanotic. Neck supple with no tenderness.  Respiratory: No signs of respiratory distress. Respiration rhythm and depth normal.   Clear to auscultation. No rales, crackles, rhonchi, or wheezing auscultated.   Cardiovascular:  Jugular Venous Pressure: Normal  Heart Rate and Rhythm: Irregularly, irregular.  Heart Sounds: Normal S1 and S2. No S3 or S4 noted.  Murmurs: No murmurs noted. No rubs, thrills, or gallops.   Arterial Pulses: Carotid pulses normal. No carotid bruit noted.   Lower Extremities: No edema noted.  Gastrointestinal:  Abdomen soft, non-distended, non-tender. Normal bowel sounds. No hepatomegaly.   Musculoskeletal: Normal movement of extremities  Skin and Nails: General appearance normal. No pallor, cyanosis, diaphoresis. Skin temperature normal. No clubbing of fingernails.   Psychiatric: Patient alert and oriented to person, place, and time. Speech and behavior appropriate. Normal mood and affect.       ECG 12 Lead  Date/Time: 7/27/2020 11:38 AM  Performed by: Aditi Louis APRN  Authorized by: Aditi Louis APRN "   Comparison: compared with previous ECG from 7/24/2019  Similar to previous ECG  Rhythm: atrial fibrillation  Ectopy: unifocal PVCs  Rate: normal  BPM: 82  Conduction: conduction normal  ST Segments: ST segments normal  T Waves: T waves normal  QRS axis: normal    Clinical impression: abnormal EKG              Assessment:       Diagnosis Plan   1. Longstanding persistent atrial fibrillation (CMS/HCC)     2. Ventricular premature beats     3. Systolic CHF, chronic (CMS/HCC)  ramipril (ALTACE) 5 MG capsule   4. Essential hypertension  ramipril (ALTACE) 5 MG capsule    ECG 12 Lead   5. Mixed hyperlipidemia     6. DAVID on CPAP     7. Morbid obesity (CMS/HCC)            Plan:       1.  Persistent Atrial fibrillation: She remains in asymptomatic, persistent atrial fibrillation with controlled ventricular response.  She is doing well on metoprolol succinate 100 mg twice a day and dabigatran. She has a [id number] score of 4, putting her at high risk for thromboembolic event annually.    2.  PVCs: Noted on today's EKG and she is asymptomatic.  Continue beta-blocker.    3.  Systolic Heart Failure: Ejection fraction has normalized.    4.  Hypertension: Blood pressure is normal today.      5.  Hyperlipidemia: Her total cholesterol and LDL were elevated on her last lipid panel in July 2019.  She is due for repeat lipid panel at her PCP office.  If her cholesterol remains elevated, she may benefit from statin therapy and I will defer to her PCP.    6.  Obstructive Sleep Apnea: Compliant with CPAP machine.    7.  Morbid Obesity: BMI 47.9.  She would benefit from exercise, weight loss, and heart healthy, low-sodium diet.    8.  I recommended a one-year follow-up with Dr. Mejia, unless otherwise needed sooner.    As always, it has been a pleasure to participate in your patient's care. Thank you.       Sincerely,         MARCELINO Perry  [facility]      · COVID-19 Precautions - Patient was  compliant in wearing a mask. When I saw the patient, I used appropriate personal protective equipment (PPE) including mask (standard procedure).  Additionally, I used gown, gloves, and eye shield if indicated.  Hand hygiene was completed before and after seeing the patient.  · Dictated using Dragon Dictation

## 2021-01-15 ENCOUNTER — OFFICE VISIT (OUTPATIENT)
Dept: FAMILY MEDICINE CLINIC | Facility: CLINIC | Age: 76
End: 2021-01-15

## 2021-01-15 VITALS
TEMPERATURE: 96.9 F | HEIGHT: 64 IN | WEIGHT: 274 LBS | DIASTOLIC BLOOD PRESSURE: 92 MMHG | BODY MASS INDEX: 46.78 KG/M2 | RESPIRATION RATE: 20 BRPM | SYSTOLIC BLOOD PRESSURE: 124 MMHG

## 2021-01-15 DIAGNOSIS — I50.22 SYSTOLIC CHF, CHRONIC (HCC): ICD-10-CM

## 2021-01-15 DIAGNOSIS — E78.5 HYPERLIPIDEMIA, UNSPECIFIED HYPERLIPIDEMIA TYPE: ICD-10-CM

## 2021-01-15 DIAGNOSIS — R79.9 ABNORMAL BLOOD CHEMISTRY LEVEL: ICD-10-CM

## 2021-01-15 DIAGNOSIS — Z00.00 MEDICARE ANNUAL WELLNESS VISIT, SUBSEQUENT: Primary | ICD-10-CM

## 2021-01-15 DIAGNOSIS — I48.19 PERSISTENT ATRIAL FIBRILLATION (HCC): ICD-10-CM

## 2021-01-15 DIAGNOSIS — I10 ESSENTIAL HYPERTENSION: ICD-10-CM

## 2021-01-15 DIAGNOSIS — I48.11 LONGSTANDING PERSISTENT ATRIAL FIBRILLATION (HCC): ICD-10-CM

## 2021-01-15 DIAGNOSIS — E55.9 VITAMIN D DEFICIENCY: ICD-10-CM

## 2021-01-15 PROCEDURE — 96160 PT-FOCUSED HLTH RISK ASSMT: CPT | Performed by: FAMILY MEDICINE

## 2021-01-15 PROCEDURE — 1170F FXNL STATUS ASSESSED: CPT | Performed by: FAMILY MEDICINE

## 2021-01-15 PROCEDURE — 1159F MED LIST DOCD IN RCRD: CPT | Performed by: FAMILY MEDICINE

## 2021-01-15 PROCEDURE — G0439 PPPS, SUBSEQ VISIT: HCPCS | Performed by: FAMILY MEDICINE

## 2021-01-15 NOTE — PROGRESS NOTES
The ABCs of the Annual Wellness Visit  Subsequent Medicare Wellness Visit    Chief Complaint   Patient presents with   • Medicare Wellness-subsequent       Subjective   History of Present Illness:  Prema Hawthorne is a 75 y.o. female who presents for a Subsequent Medicare Wellness Visit.    Ramipril 5 mg a day, metoprolol  mg a day.  Blood pressure in the office today 124/92. Home readings are same.  Denies chest pain shortness of breath headache with vision changes.  Usually has a high blood pressure reading in the office due to anxiety.  Is also on Pradaxa 50 mg a day. Last saw cardiology nurse practitioner in July 2020.  No changes.     DAVID on CPAP: Follows with sleep specialist     Next follow-up with cardiology is at the end of this month.     Colon cancer screening: Needs this   Mammogram: Follows with gyn UTD per patient   PNA vaccine at [facility] in 9/2020  Flu vaccine at [facility] in 9/2020        HEALTH RISK ASSESSMENT    Recent Hospitalizations:  No hospitalization(s) within the last year.    Current Medical Providers:  Patient Care Team:  Jennifer Taylor MD as PCP - General (Family Medicine)  Brit Mejia MD as Consulting Physician (Cardiology)  Christiane Goldman MD as Consulting Physician (Obstetrics and Gynecology)  Deloris Tolentino MD as Consulting Physician (Pulmonary Disease)  Chaz Molina MD as Consulting Physician (Orthopedic Surgery)    Smoking Status:  Social History     Tobacco Use   Smoking Status Never Smoker   Smokeless Tobacco Never Used       Alcohol Consumption:  Social History     Substance and Sexual Activity   Alcohol Use Yes    Comment: Occ//caffeine use: 2 cups daily       Depression Screen:   PHQ-2/PHQ-9 Depression Screening 1/15/2021   Little interest or pleasure in doing things 1   Feeling down, depressed, or hopeless 2   Trouble falling or staying asleep, or sleeping too much 0   Feeling tired or having little energy 1   Poor appetite or overeating 0   Feeling  bad about yourself - or that you are a failure or have let yourself or your family down 0   Trouble concentrating on things, such as reading the newspaper or watching television 0   Moving or speaking so slowly that other people could have noticed. Or the opposite - being so fidgety or restless that you have been moving around a lot more than usual 0   Thoughts that you would be better off dead, or of hurting yourself in some way 0   Total Score 4   If you checked off any problems, how difficult have these problems made it for you to do your work, take care of things at home, or get along with other people? Not difficult at all       Fall Risk Screen:  BIJAL Fall Risk Assessment has not been completed.    Health Habits and Functional and Cognitive Screening:  Functional & Cognitive Status 1/15/2021   Do you have difficulty preparing food and eating? No   Do you have difficulty bathing yourself, getting dressed or grooming yourself? No   Do you have difficulty using the toilet? No   Do you have difficulty moving around from place to place? No   Do you have trouble with steps or getting out of a bed or a chair? No   Current Diet Well Balanced Diet   Dental Exam Up to date   Eye Exam Up to date   Exercise (times per week) 5 times per week   Current Exercise Activities Include Walking   Do you need help using the phone?  No   Are you deaf or do you have serious difficulty hearing?  No   Do you need help with transportation? Yes   Do you need help shopping? Yes   Do you need help preparing meals?  No   Do you need help with housework?  Yes   Do you need help with laundry? No   Do you need help taking your medications? No   Do you need help managing money? No   Do you ever drive or ride in a car without wearing a seat belt? No   Have you felt unusual stress, anger or loneliness in the last month? -   Who do you live with? -   If you need help, do you have trouble finding someone available to you? -   Have you been  bothered in the last four weeks by sexual problems? -   Do you have difficulty concentrating, remembering or making decisions? -         Does the patient have evidence of cognitive impairment? No    Asprin use counseling:Does not need ASA (and currently is not on it)    Age-appropriate Screening Schedule:  Refer to the list below for future screening recommendations based on patient's age, sex and/or medical conditions. Orders for these recommended tests are listed in the plan section. The patient has been provided with a written plan.    Health Maintenance   Topic Date Due   • COLONOSCOPY  1945   • TDAP/TD VACCINES (1 - Tdap) 07/24/1963   • ZOSTER VACCINE (1 of 2) 07/24/1994   • LIPID PANEL  07/10/2020   • INFLUENZA VACCINE  08/01/2020   • MAMMOGRAM  12/01/2020          The following portions of the patient's history were reviewed and updated as appropriate: allergies, current medications, past family history, past medical history, past social history, past surgical history and problem list.    Outpatient Medications Prior to Visit   Medication Sig Dispense Refill   • metoprolol succinate XL (TOPROL-XL) 100 MG 24 hr tablet Take 1 tablet by mouth 2 (Two) Times a Day. 180 tablet 3   • ramipril (ALTACE) 5 MG capsule Take 1 capsule by mouth Daily. 90 capsule 3   • rivaroxaban (Xarelto) 20 MG tablet Take 1 tablet by mouth Daily With Dinner. 90 tablet 3   • Coenzyme Q10 (COQ10 PO) Take 1 capsule by mouth daily.     • Turmeric Curcumin 500 MG capsule Take 1 tablet by mouth Daily.       No facility-administered medications prior to visit.        Patient Active Problem List   Diagnosis   • Atrial fibrillation (CMS/HCC)   • Hypertension   • Morbid obesity (CMS/HCC)   • DAVID on CPAP   • Ventricular premature beats   • Sleep related hypoxia   • Hyperlipidemia   • Vitamin D deficiency       Advanced Care Planning:  ACP discussion was held with the patient during this visit. Patient does not have an advance directive,  "information provided.    Review of Systems   Constitutional: Negative for fever.   HENT: Negative for nosebleeds and trouble swallowing.    Eyes: Negative for visual disturbance.   Respiratory: Negative for choking and stridor.    Cardiovascular: Negative for chest pain.   Gastrointestinal: Negative for blood in stool.   Endocrine: Negative for polydipsia.   Genitourinary: Negative for genital sores and hematuria.   Musculoskeletal: Negative for joint swelling.   Skin: Negative for color change and rash.   Allergic/Immunologic: Negative for immunocompromised state.   Neurological: Negative for seizures, facial asymmetry and speech difficulty.   Hematological: Negative for adenopathy.   Psychiatric/Behavioral: Negative for behavioral problems, self-injury and suicidal ideas.       Compared to one year ago, the patient feels her physical health is the same.  Compared to one year ago, the patient feels her mental health is the same.    Reviewed chart for potential of high risk medication in the elderly: yes  Reviewed chart for potential of harmful drug interactions in the elderly:yes    Objective         Vitals:    01/15/21 1312   BP: 124/92   Resp: 20   Temp: 96.9 °F (36.1 °C)   Weight: 124 kg (274 lb)   Height: 161.9 cm (63.74\")       Body mass index is 47.42 kg/m².  Discussed the patient's BMI with her. The BMI is above average; BMI management plan is completed.    Physical Exam  Constitutional:       Appearance: She is well-developed.   HENT:      Head: Normocephalic and atraumatic.      Right Ear: External ear normal.      Left Ear: External ear normal.      Nose: Nose normal.   Eyes:      Conjunctiva/sclera: Conjunctivae normal.      Pupils: Pupils are equal, round, and reactive to light.   Neck:      Musculoskeletal: Normal range of motion.   Cardiovascular:      Rate and Rhythm: Normal rate and regular rhythm.   Pulmonary:      Effort: Pulmonary effort is normal. No respiratory distress.      Breath sounds: " Normal breath sounds. No stridor.   Abdominal:      General: Bowel sounds are normal. There is no distension.      Palpations: Abdomen is soft.      Tenderness: There is no abdominal tenderness.   Skin:     General: Skin is warm.   Neurological:      Mental Status: She is alert and oriented to person, place, and time.   Psychiatric:         Behavior: Behavior normal.               Assessment/Plan   Medicare Risks and Personalized Health Plan  CMS Preventative Services Quick Reference  Advance Directive Discussion  Breast Cancer/Mammogram Screening  Colon Cancer Screening  Diabetic Lab Screening   Obesity/Overweight     The above risks/problems have been discussed with the patient.  Pertinent information has been shared with the patient in the After Visit Summary.  Follow up plans and orders are seen below in the Assessment/Plan Section.    Diagnoses and all orders for this visit:    1. Medicare annual wellness visit, subsequent (Primary)    2. Essential hypertension  -     CBC & Differential  -     Comprehensive Metabolic Panel  -     TSH Rfx On Abnormal To Free T4    3. Vitamin D deficiency  -     Vitamin D 25 Hydroxy    4. Persistent atrial fibrillation (CMS/HCC)    5. Hyperlipidemia, unspecified hyperlipidemia type  -     Lipid Panel    6. Systolic CHF, chronic (CMS/HCC)    7. Longstanding persistent atrial fibrillation (CMS/HCC)    8. Abnormal blood chemistry level  -     Vitamin B12 & Folate      Follow Up:  Return in about 6 months (around 7/15/2021) for Recheck.     An After Visit Summary and PPPS were given to the patient.

## 2021-01-20 LAB
25(OH)D3+25(OH)D2 SERPL-MCNC: 31.5 NG/ML (ref 30–100)
ALBUMIN SERPL-MCNC: 4.1 G/DL (ref 3.5–5.2)
ALBUMIN/GLOB SERPL: 1.5 G/DL
ALP SERPL-CCNC: 63 U/L (ref 39–117)
ALT SERPL-CCNC: 18 U/L (ref 1–33)
AST SERPL-CCNC: 23 U/L (ref 1–32)
BASOPHILS # BLD AUTO: 0.06 10*3/MM3 (ref 0–0.2)
BASOPHILS NFR BLD AUTO: 1.1 % (ref 0–1.5)
BILIRUB SERPL-MCNC: 0.5 MG/DL (ref 0–1.2)
BUN SERPL-MCNC: 21 MG/DL (ref 8–23)
BUN/CREAT SERPL: 26.9 (ref 7–25)
CALCIUM SERPL-MCNC: 10.1 MG/DL (ref 8.6–10.5)
CHLORIDE SERPL-SCNC: 103 MMOL/L (ref 98–107)
CHOLEST SERPL-MCNC: 213 MG/DL (ref 0–200)
CO2 SERPL-SCNC: 28.5 MMOL/L (ref 22–29)
CREAT SERPL-MCNC: 0.78 MG/DL (ref 0.57–1)
EOSINOPHIL # BLD AUTO: 0.08 10*3/MM3 (ref 0–0.4)
EOSINOPHIL NFR BLD AUTO: 1.5 % (ref 0.3–6.2)
ERYTHROCYTE [DISTWIDTH] IN BLOOD BY AUTOMATED COUNT: 13.2 % (ref 12.3–15.4)
FOLATE SERPL-MCNC: 15.7 NG/ML (ref 4.78–24.2)
GLOBULIN SER CALC-MCNC: 2.8 GM/DL
GLUCOSE SERPL-MCNC: 96 MG/DL (ref 65–99)
HCT VFR BLD AUTO: 44.5 % (ref 34–46.6)
HDLC SERPL-MCNC: 55 MG/DL (ref 40–60)
HGB BLD-MCNC: 15.3 G/DL (ref 12–15.9)
IMM GRANULOCYTES # BLD AUTO: 0.03 10*3/MM3 (ref 0–0.05)
IMM GRANULOCYTES NFR BLD AUTO: 0.6 % (ref 0–0.5)
LDLC SERPL CALC-MCNC: 143 MG/DL (ref 0–100)
LYMPHOCYTES # BLD AUTO: 1.27 10*3/MM3 (ref 0.7–3.1)
LYMPHOCYTES NFR BLD AUTO: 24 % (ref 19.6–45.3)
MCH RBC QN AUTO: 31.4 PG (ref 26.6–33)
MCHC RBC AUTO-ENTMCNC: 34.4 G/DL (ref 31.5–35.7)
MCV RBC AUTO: 91.4 FL (ref 79–97)
MONOCYTES # BLD AUTO: 0.58 10*3/MM3 (ref 0.1–0.9)
MONOCYTES NFR BLD AUTO: 10.9 % (ref 5–12)
NEUTROPHILS # BLD AUTO: 3.28 10*3/MM3 (ref 1.7–7)
NEUTROPHILS NFR BLD AUTO: 61.9 % (ref 42.7–76)
NRBC BLD AUTO-RTO: 0 /100 WBC (ref 0–0.2)
PLATELET # BLD AUTO: 258 10*3/MM3 (ref 140–450)
POTASSIUM SERPL-SCNC: 5.4 MMOL/L (ref 3.5–5.2)
PROT SERPL-MCNC: 6.9 G/DL (ref 6–8.5)
RBC # BLD AUTO: 4.87 10*6/MM3 (ref 3.77–5.28)
SODIUM SERPL-SCNC: 141 MMOL/L (ref 136–145)
TRIGL SERPL-MCNC: 84 MG/DL (ref 0–150)
TSH SERPL DL<=0.005 MIU/L-ACNC: 2.25 UIU/ML (ref 0.27–4.2)
VIT B12 SERPL-MCNC: 429 PG/ML (ref 211–946)
VLDLC SERPL CALC-MCNC: 15 MG/DL (ref 5–40)
WBC # BLD AUTO: 5.3 10*3/MM3 (ref 3.4–10.8)

## 2021-01-22 NOTE — PROGRESS NOTES
All labs essentially normal except for continued elevations in total cholesterol and LDL; potassium also slightly elevated at 5.4.  This can be transient.  Recheck BMP today or tomorrow.  Recommend telephone visit to discuss lipid panel and consider treatment options to improve lipid numbers.

## 2021-02-01 ENCOUNTER — TELEPHONE (OUTPATIENT)
Dept: FAMILY MEDICINE CLINIC | Facility: CLINIC | Age: 76
End: 2021-02-01

## 2021-02-01 DIAGNOSIS — E87.5 SERUM POTASSIUM ELEVATED: Primary | ICD-10-CM

## 2021-02-01 NOTE — TELEPHONE ENCOUNTER
----- Message from Jennifer Taylor MD sent at 1/22/2021  1:15 PM EST -----  All labs essentially normal except for continued elevations in total cholesterol and LDL; potassium also slightly elevated at 5.4.  This can be transient.  Recheck BMP today or tomorrow.  Recommend telephone visit to discuss lipid panel and consider treatment options to improve lipid numbers.

## 2021-02-03 ENCOUNTER — TELEPHONE (OUTPATIENT)
Dept: FAMILY MEDICINE CLINIC | Facility: CLINIC | Age: 76
End: 2021-02-03

## 2021-02-03 DIAGNOSIS — E87.5 SERUM POTASSIUM ELEVATED: Primary | ICD-10-CM

## 2021-02-03 LAB
BUN SERPL-MCNC: 22 MG/DL (ref 8–23)
BUN/CREAT SERPL: 23.4 (ref 7–25)
CALCIUM SERPL-MCNC: 10.1 MG/DL (ref 8.6–10.5)
CHLORIDE SERPL-SCNC: 104 MMOL/L (ref 98–107)
CO2 SERPL-SCNC: 29.4 MMOL/L (ref 22–29)
CREAT SERPL-MCNC: 0.94 MG/DL (ref 0.57–1)
GLUCOSE SERPL-MCNC: 102 MG/DL (ref 65–99)
POTASSIUM SERPL-SCNC: 5.5 MMOL/L (ref 3.5–5.2)
SODIUM SERPL-SCNC: 139 MMOL/L (ref 136–145)

## 2021-02-03 NOTE — TELEPHONE ENCOUNTER
----- Message from Jennifer Taylor MD sent at 2/3/2021  8:40 AM EST -----  Potassium is still elevated.  Recommend cut ramipril in half so taking 2.5 mg a day.  Monitor blood pressure and recheck BMP on Friday.

## 2021-02-03 NOTE — TELEPHONE ENCOUNTER
Potassium is still elevated.  Recommend cut ramipril in half so taking 2.5 mg a day.  Monitor blood pressure and recheck BMP on Friday.

## 2021-02-04 ENCOUNTER — TELEPHONE (OUTPATIENT)
Dept: FAMILY MEDICINE CLINIC | Facility: CLINIC | Age: 76
End: 2021-02-04

## 2021-02-04 DIAGNOSIS — I50.22 SYSTOLIC CHF, CHRONIC (HCC): ICD-10-CM

## 2021-02-04 DIAGNOSIS — I10 ESSENTIAL HYPERTENSION: ICD-10-CM

## 2021-02-04 RX ORDER — RAMIPRIL 2.5 MG/1
2.5 CAPSULE ORAL DAILY
Qty: 30 CAPSULE | Refills: 0 | Status: CANCELLED | OUTPATIENT
Start: 2021-02-04

## 2021-02-04 RX ORDER — RAMIPRIL 2.5 MG/1
5 CAPSULE ORAL DAILY
Qty: 30 CAPSULE | Refills: 0 | Status: SHIPPED | OUTPATIENT
Start: 2021-02-04 | End: 2021-02-05 | Stop reason: SDUPTHER

## 2021-02-04 NOTE — TELEPHONE ENCOUNTER
Caller: Prema Hawthorne    Relationship to patient: Self    Best call back number: [phone]    Patient is needing: PATIENT SAID THAT SHE NEEDED RAMIPRIL SENT TO [facility] #[id number] [place], [place] - [address] AT [facility] [address] [facility] - [phone] [facility] [phone]   [phone]    SHE SAID THAT SHE WOULD NOT RECEIVE PRESCRIPTION FOR 7-10 WITH [facility].

## 2021-02-05 ENCOUNTER — TELEPHONE (OUTPATIENT)
Dept: FAMILY MEDICINE CLINIC | Facility: CLINIC | Age: 76
End: 2021-02-05

## 2021-02-05 DIAGNOSIS — I50.22 SYSTOLIC CHF, CHRONIC (HCC): ICD-10-CM

## 2021-02-05 DIAGNOSIS — I10 ESSENTIAL HYPERTENSION: ICD-10-CM

## 2021-02-05 RX ORDER — RAMIPRIL 2.5 MG/1
5 CAPSULE ORAL DAILY
Qty: 30 CAPSULE | Refills: 0 | Status: SHIPPED | OUTPATIENT
Start: 2021-02-05 | End: 2021-02-05

## 2021-02-05 RX ORDER — RAMIPRIL 2.5 MG/1
2.5 CAPSULE ORAL DAILY
Qty: 30 CAPSULE | Refills: 0 | Status: SHIPPED | OUTPATIENT
Start: 2021-02-05 | End: 2021-03-02

## 2021-02-05 NOTE — TELEPHONE ENCOUNTER
Caller: Prema Hawthorne    Relationship: Self    Best call back number: [phone]     What medications are you currently taking:   Current Outpatient Medications on File Prior to Visit   Medication Sig Dispense Refill   • Coenzyme Q10 (COQ10 PO) Take 1 capsule by mouth daily.     • metoprolol succinate XL (TOPROL-XL) 100 MG 24 hr tablet Take 1 tablet by mouth 2 (Two) Times a Day. 180 tablet 3   • ramipril (ALTACE) 2.5 MG capsule Take 2 capsules by mouth Daily. 30 capsule 0   • rivaroxaban (Xarelto) 20 MG tablet Take 1 tablet by mouth Daily With Dinner. 90 tablet 3   • Turmeric Curcumin 500 MG capsule Take 1 tablet by mouth Daily.     • [DISCONTINUED] ramipril (ALTACE) 2.5 MG capsule Take 2 capsules by mouth Daily. 30 capsule 0     No current facility-administered medications on file prior to visit.       Which medication are you concerned about: RAMIPRIL 2.5 MG    Who prescribed you this medication: DR. MISAEL BRUMFIELD    What are your concerns: PRESCRIPTION FOR MEDICATION WAS SENT OVER FOR 2.5 MG TWICE A DAY BUT PATIENT STATES THAT SHE SUPPOSE TO BE CUTTING DOWN ON MEDICATION AND ONLY SUPPOSE TO BE TAKING 2.5 MG ONCE A DAY. PLEASE CALL [facility] AND CLARIFY DIRECTIONS. PLEASE CALL PATIENT WHEN DONE THANK YOU.       PHARMACY:[facility] #[id number] [place], [place] - [address] AT [facility] [address] [facility] [phone] [facility] [phone] FX

## 2021-02-12 ENCOUNTER — OFFICE VISIT (OUTPATIENT)
Dept: FAMILY MEDICINE CLINIC | Facility: CLINIC | Age: 76
End: 2021-02-12

## 2021-02-12 VITALS
BODY MASS INDEX: 47.46 KG/M2 | HEART RATE: 85 BPM | DIASTOLIC BLOOD PRESSURE: 72 MMHG | TEMPERATURE: 96.7 F | SYSTOLIC BLOOD PRESSURE: 126 MMHG | RESPIRATION RATE: 20 BRPM | HEIGHT: 64 IN | WEIGHT: 278 LBS | OXYGEN SATURATION: 97 %

## 2021-02-12 DIAGNOSIS — I10 ESSENTIAL HYPERTENSION: Primary | ICD-10-CM

## 2021-02-12 DIAGNOSIS — E78.5 HYPERLIPIDEMIA, UNSPECIFIED HYPERLIPIDEMIA TYPE: ICD-10-CM

## 2021-02-12 DIAGNOSIS — R79.9 ABNORMAL BLOOD CHEMISTRY LEVEL: ICD-10-CM

## 2021-02-12 PROCEDURE — 99213 OFFICE O/P EST LOW 20 MIN: CPT | Performed by: FAMILY MEDICINE

## 2021-02-12 NOTE — PROGRESS NOTES
Subjective   Prema Hawthorne is a 75 y.o. female.     History of Present Illness     75-year-old female presents today for follow-up on elevated potassium on labs.    January 20 potassium was 5.4.  Recheck a couple weeks later showed it was still elevated at 5.5.  At this point I advised her to decrease her ramipril to 2.5 mg once a day.  Blood pressure in the office today 126/72.  Blood pressure ranges at home similar.  Denies chest pain shortness of breath headache with vision changes.  Due for recheck today.    Lipid panel shows showed elevated total cholesterol at 213 and LDL at 143.  HDL was normal at 55. 2013 AHA (American Heart Association) Cholesterol Risk Ratio Score Goal is <=13.4% and your score is:  22%.  Recommend moderate statin; such as pravastatin 40 mg a day.    The following portions of the patient's history were reviewed and updated as appropriate: allergies, current medications, past family history, past medical history, past social history, past surgical history and problem list.    Review of Systems   Constitutional: Negative for chills and fever.   HENT: Negative for congestion.    Respiratory: Negative for cough and shortness of breath.    Cardiovascular: Negative for chest pain, palpitations and leg swelling.   Gastrointestinal: Negative for abdominal pain, diarrhea and vomiting.       Objective   Physical Exam  Constitutional:       Appearance: She is well-developed.   HENT:      Head: Normocephalic and atraumatic.      Mouth/Throat:      Pharynx: Uvula midline.   Eyes:      Pupils: Pupils are equal, round, and reactive to light.   Cardiovascular:      Rate and Rhythm: Normal rate and regular rhythm.      Heart sounds: No murmur.   Pulmonary:      Effort: Pulmonary effort is normal. No respiratory distress.      Breath sounds: Normal breath sounds. No stridor. No wheezing or rales.   Skin:     General: Skin is warm.   Neurological:      Mental Status: She is alert.   Psychiatric:          Behavior: Behavior normal.           No results found for: COLORU, CLARITYU, SPECGRAV, PHUR, LEUKOCYTESUR, NITRITE, PROTEINPOCUA, GLUCOSEUR, KETONESU, UROBILINOGEN, BILIRUBINUR, RBCUR      Assessment/Plan     Diagnoses and all orders for this visit:    1. Essential hypertension (Primary)    2. Abnormal blood chemistry level    3. Hyperlipidemia, unspecified hyperlipidemia type  -     Ambulatory Referral to Nutrition Services  -     Lipid Panel; Future    Patient reports regular diet and exercise amenable to nutritionist referral; recheck lipid panel in 3 months.  Waiting on BMP results from today.  If still having elevated potassium will discuss with cardiologist about discontinuing ramipril.

## 2021-02-13 LAB
BUN SERPL-MCNC: 26 MG/DL (ref 8–23)
BUN/CREAT SERPL: 30.6 (ref 7–25)
CALCIUM SERPL-MCNC: 10.2 MG/DL (ref 8.6–10.5)
CHLORIDE SERPL-SCNC: 102 MMOL/L (ref 98–107)
CO2 SERPL-SCNC: 25.9 MMOL/L (ref 22–29)
CREAT SERPL-MCNC: 0.85 MG/DL (ref 0.57–1)
GLUCOSE SERPL-MCNC: 83 MG/DL (ref 65–99)
POTASSIUM SERPL-SCNC: 5.2 MMOL/L (ref 3.5–5.2)
SODIUM SERPL-SCNC: 139 MMOL/L (ref 136–145)

## 2021-02-15 ENCOUNTER — TELEPHONE (OUTPATIENT)
Dept: FAMILY MEDICINE CLINIC | Facility: CLINIC | Age: 76
End: 2021-02-15

## 2021-02-15 NOTE — TELEPHONE ENCOUNTER
----- Message from Jennifer Taylor MD sent at 2/15/2021  9:18 AM EST -----  BMP essentially normal.  Because we have decreased ramipril dose recommend recheck of BMP in 1 month to ensure potassium is staying normal.

## 2021-03-02 DIAGNOSIS — I50.22 SYSTOLIC CHF, CHRONIC (HCC): ICD-10-CM

## 2021-03-02 DIAGNOSIS — I10 ESSENTIAL HYPERTENSION: ICD-10-CM

## 2021-03-02 RX ORDER — RAMIPRIL 2.5 MG/1
2.5 CAPSULE ORAL DAILY
Qty: 30 CAPSULE | Refills: 3 | Status: SHIPPED | OUTPATIENT
Start: 2021-03-02 | End: 2021-03-25 | Stop reason: SDUPTHER

## 2021-03-03 ENCOUNTER — APPOINTMENT (OUTPATIENT)
Dept: DIABETES SERVICES | Facility: HOSPITAL | Age: 76
End: 2021-03-03

## 2021-03-08 ENCOUNTER — TELEPHONE (OUTPATIENT)
Dept: FAMILY MEDICINE CLINIC | Facility: CLINIC | Age: 76
End: 2021-03-08

## 2021-03-08 DIAGNOSIS — E87.5 SERUM POTASSIUM ELEVATED: Primary | ICD-10-CM

## 2021-03-08 NOTE — TELEPHONE ENCOUNTER
PATIENT IS WANTING TO KNOW IF SHE NEEDS LAB ORDERS SENT IN TO GET POTASSIUM CHECKED, PLEASE ADVISE.       CALL BACK:[phone]

## 2021-03-09 ENCOUNTER — OUTSIDE FACILITY SERVICE (OUTPATIENT)
Dept: FAMILY MEDICINE CLINIC | Facility: CLINIC | Age: 76
End: 2021-03-09

## 2021-03-09 DIAGNOSIS — Z23 IMMUNIZATION DUE: ICD-10-CM

## 2021-03-09 PROCEDURE — OUTSIDEPOS PR OUTSIDE POS PLACEHOLDER: Performed by: FAMILY MEDICINE

## 2021-03-10 LAB
BUN SERPL-MCNC: 21 MG/DL (ref 8–23)
BUN/CREAT SERPL: 24.7 (ref 7–25)
CALCIUM SERPL-MCNC: 9.8 MG/DL (ref 8.6–10.5)
CHLORIDE SERPL-SCNC: 105 MMOL/L (ref 98–107)
CO2 SERPL-SCNC: 27.6 MMOL/L (ref 22–29)
CREAT SERPL-MCNC: 0.85 MG/DL (ref 0.57–1)
GLUCOSE SERPL-MCNC: 89 MG/DL (ref 65–99)
POTASSIUM SERPL-SCNC: 4.7 MMOL/L (ref 3.5–5.2)
SODIUM SERPL-SCNC: 141 MMOL/L (ref 136–145)

## 2021-03-15 ENCOUNTER — APPOINTMENT (OUTPATIENT)
Dept: WOMENS IMAGING | Facility: HOSPITAL | Age: 76
End: 2021-03-15

## 2021-03-15 PROCEDURE — 77063 BREAST TOMOSYNTHESIS BI: CPT | Performed by: RADIOLOGY

## 2021-03-15 PROCEDURE — 77067 SCR MAMMO BI INCL CAD: CPT | Performed by: RADIOLOGY

## 2021-03-18 ENCOUNTER — TELEPHONE (OUTPATIENT)
Dept: CARDIOLOGY | Facility: CLINIC | Age: 76
End: 2021-03-18

## 2021-03-18 ENCOUNTER — TRANSCRIBE ORDERS (OUTPATIENT)
Dept: PREADMISSION TESTING | Facility: HOSPITAL | Age: 76
End: 2021-03-18

## 2021-03-18 ENCOUNTER — APPOINTMENT (OUTPATIENT)
Dept: PREADMISSION TESTING | Facility: HOSPITAL | Age: 76
End: 2021-03-18

## 2021-03-18 VITALS
BODY MASS INDEX: 51.03 KG/M2 | HEIGHT: 63 IN | OXYGEN SATURATION: 98 % | RESPIRATION RATE: 18 BRPM | DIASTOLIC BLOOD PRESSURE: 103 MMHG | SYSTOLIC BLOOD PRESSURE: 156 MMHG | HEART RATE: 87 BPM | WEIGHT: 288 LBS | TEMPERATURE: 98.2 F

## 2021-03-18 DIAGNOSIS — Z01.818 OTHER SPECIFIED PRE-OPERATIVE EXAMINATION: Primary | ICD-10-CM

## 2021-03-18 LAB
BASOPHILS # BLD AUTO: 0.06 10*3/MM3 (ref 0–0.2)
BASOPHILS NFR BLD AUTO: 1.1 % (ref 0–1.5)
DEPRECATED RDW RBC AUTO: 44.5 FL (ref 37–54)
EOSINOPHIL # BLD AUTO: 0.08 10*3/MM3 (ref 0–0.4)
EOSINOPHIL NFR BLD AUTO: 1.4 % (ref 0.3–6.2)
ERYTHROCYTE [DISTWIDTH] IN BLOOD BY AUTOMATED COUNT: 13.7 % (ref 12.3–15.4)
HCT VFR BLD AUTO: 43.5 % (ref 34–46.6)
HGB BLD-MCNC: 14.7 G/DL (ref 12–15.9)
IMM GRANULOCYTES # BLD AUTO: 0.03 10*3/MM3 (ref 0–0.05)
IMM GRANULOCYTES NFR BLD AUTO: 0.5 % (ref 0–0.5)
LYMPHOCYTES # BLD AUTO: 1.41 10*3/MM3 (ref 0.7–3.1)
LYMPHOCYTES NFR BLD AUTO: 25 % (ref 19.6–45.3)
MCH RBC QN AUTO: 30.4 PG (ref 26.6–33)
MCHC RBC AUTO-ENTMCNC: 33.8 G/DL (ref 31.5–35.7)
MCV RBC AUTO: 89.9 FL (ref 79–97)
MONOCYTES # BLD AUTO: 0.57 10*3/MM3 (ref 0.1–0.9)
MONOCYTES NFR BLD AUTO: 10.1 % (ref 5–12)
NEUTROPHILS NFR BLD AUTO: 3.48 10*3/MM3 (ref 1.7–7)
NEUTROPHILS NFR BLD AUTO: 61.9 % (ref 42.7–76)
NRBC BLD AUTO-RTO: 0 /100 WBC (ref 0–0.2)
PLATELET # BLD AUTO: 243 10*3/MM3 (ref 140–450)
PMV BLD AUTO: 10.7 FL (ref 6–12)
QT INTERVAL: 382 MS
RBC # BLD AUTO: 4.84 10*6/MM3 (ref 3.77–5.28)
WBC # BLD AUTO: 5.63 10*3/MM3 (ref 3.4–10.8)

## 2021-03-18 PROCEDURE — 93005 ELECTROCARDIOGRAM TRACING: CPT

## 2021-03-18 PROCEDURE — 36415 COLL VENOUS BLD VENIPUNCTURE: CPT

## 2021-03-18 PROCEDURE — 85025 COMPLETE CBC W/AUTO DIFF WBC: CPT

## 2021-03-18 PROCEDURE — 93010 ELECTROCARDIOGRAM REPORT: CPT | Performed by: INTERNAL MEDICINE

## 2021-03-18 NOTE — DISCHARGE INSTRUCTIONS
Take the following medications the morning of surgery:  METOPROLOL      Arrive to hospital on your day of surgery at 7:30 AM      If you are on prescription narcotic pain medication to control your pain you may also take that medication the morning of surgery.    General Instructions:  • Do not eat solid food after midnight the night before surgery.  • You may drink clear liquids day of surgery but must stop at least one hour before your hospital arrival time.  • It is beneficial for you to have a clear drink that contains carbohydrates the day of surgery.  We suggest a 12 to 20 ounce bottle of Gatorade or Powerade for non-diabetic patients or a 12 to 20 ounce bottle of G2 or Powerade Zero for diabetic patients. (Pediatric patients, are not advised to drink a 12 to 20 ounce carbohydrate drink)    Clear liquids are liquids you can see through.  Nothing red in color.     Plain water                               Sports drinks  Sodas                                   Gelatin (Jell-O)  Fruit juices without pulp such as white grape juice and apple juice  Popsicles that contain no fruit or yogurt  Tea or coffee (no cream or milk added)  Gatorade / Powerade  G2 / Powerade Zero    • Infants may have breast milk up to four hours before surgery.  • Infants drinking formula may drink formula up to six hours before surgery.   • Patients who avoid smoking, chewing tobacco and alcohol for 4 weeks prior to surgery have a reduced risk of post-operative complications.  Quit smoking as many days before surgery as you can.  • Do not smoke, use chewing tobacco or drink alcohol the day of surgery.   • If applicable bring your C-PAP/ BI-PAP machine.  • Bring any papers given to you in the doctor’s office.  • Wear clean comfortable clothes.  • Do not wear contact lenses, false eyelashes or make-up.  Bring a case for your glasses.   • Bring crutches or walker if applicable.  • Remove all piercings.  Leave jewelry and any other valuables at  home.  • Hair extensions with metal clips must be removed prior to surgery.  • The Pre-Admission Testing nurse will instruct you to bring medications if unable to obtain an accurate list in Pre-Admission Testing.        If you were given a blood bank ID arm band remember to bring it with you the day of surgery.    Preventing a Surgical Site Infection:  • For 2 to 3 days before surgery, avoid shaving with a razor because the razor can irritate skin and make it easier to develop an infection.    • Any areas of open skin can increase the risk of a post-operative wound infection by allowing bacteria to enter and travel throughout the body.  Notify your surgeon if you have any skin wounds / rashes even if it is not near the expected surgical site.  The area will need assessed to determine if surgery should be delayed until it is healed.  • The night prior to surgery shower using a fresh bar of anti-bacterial soap (such as Dial) and clean washcloth.  Sleep in a clean bed with clean clothing.  Do not allow pets to sleep with you.  • Shower on the morning of surgery using a fresh bar of anti-bacterial soap (such as Dial) and clean washcloth.  Dry with a clean towel and dress in clean clothing.  • Ask your surgeon if you will be receiving antibiotics prior to surgery.  • Make sure you, your family, and all healthcare providers clean their hands with soap and water or an alcohol based hand  before caring for you or your wound.    Day of surgery:  Your arrival time is approximately two hours before your scheduled surgery time.  Upon arrival, a Pre-op nurse and Anesthesiologist will review your health history, obtain vital signs, and answer questions you may have.  The only belongings needed at this time will be a list of your home medications and if applicable your C-PAP/BI-PAP machine.  A Pre-op nurse will start an IV and you may receive medication in preparation for surgery, including something to help you relax.      Please be aware that surgery does come with discomfort.  We want to make every effort to control your discomfort so please discuss any uncontrolled symptoms with your nurse.   Your doctor will most likely have prescribed pain medications.      If you are going home after surgery you will receive individualized written care instructions before being discharged.  A responsible adult must drive you to and from the hospital on the day of your surgery and stay with you for 24 hours.  Discharge prescriptions can be filled by the hospital pharmacy during regular pharmacy hours.  If you are having surgery late in the day/evening your prescription may be e-prescribed to your pharmacy.  Please verify your pharmacy hours or chose a 24 hour pharmacy to avoid not having access to your prescription because your pharmacy has closed for the day.    If you are staying overnight following surgery, you will be transported to your hospital room following the recovery period.  [facility] has all private rooms.    If you have any questions please call Pre-Admission Testing at [phone].  Deductibles and co-payments are collected on the day of service. Please be prepared to pay the required co-pay, deductible or deposit on the day of service as defined by your plan.    Patient Education for Self-Quarantine Process    Following your COVID testing, we strongly recommend that you do not leave your home after you have been tested for COVID except to get medical care. This includes not going to work, school or to public areas.  If this is not possible for you to do please limit your activities to only required outings.  Be sure to wear a mask when you are with other people, practice social distancing and wash your hands frequently.      The following items provide additional details to keep you safe.  • Wash your hands with soap and water frequently for at least 20 seconds.   • Avoid touching your eyes, nose and mouth  with unwashed hands.  • Do not share anything - utensils, towels, food from the same bowl.   • Have your own utensils, drinking glass, dishes, towels and bedding.   • Do not have visitors.   • Do use FaceTime to stay in touch with family and friends.  • You should stay in a specific room away from others if possible.   • Stay at least 6 feet away from others in the home if you cannot have a dedicated room to yourself.   • Do not snuggle with your pet. While the CDC says there is no evidence that pets can spread COVID-19 or be infected from humans, it is probably best to avoid “petting, snuggling, being kissed or licked and sharing food (during self-quarantine)”, according to the CDC.   • Sanitize household surfaces daily. Include all high touch areas (door handles, light switches, phones, countertops, etc.)  • Do not share a bathroom with others, if possible.   • Wear a mask around others in your home if you are unable to stay in a separate room or 6 feet apart. If  you are unable to wear a mask, have your family member wear a mask if they must be within 6 feet of you.   Call your surgeon immediately if you experience any of the following symptoms:  • Sore Throat  • Shortness of Breath or difficulty breathing  • Cough  • Chills  • Body soreness or muscle pain  • Headache  • Fever  • New loss of taste or smell  • Do not arrive for your surgery ill.  Your procedure will need to be rescheduled to another time.  You will need to call your physician before the day of surgery to avoid any unnecessary exposure to hospital staff as well as other patients.

## 2021-03-18 NOTE — TELEPHONE ENCOUNTER
Pt called stating that she is scheduled to have HYSTEROSCOPY WITH  DILATATION AND CURETTAGE on Wednesday 3/24/2021    She would like to know when/if she should hold her Xarelto

## 2021-03-18 NOTE — TELEPHONE ENCOUNTER
Pt calling back not sure if she worded her last message correctly wants to know when to stop and restart Xarelto for her D/C.    DA

## 2021-03-18 NOTE — TELEPHONE ENCOUNTER
Called pt and she verbalized understanding to hold the xarelto for 48 hrs prior to surgery. I have advised her to call her GYN and surgeon to follow up to find out when she needs to restart the medication and that she is at risk for stroke when she is off of it. Pt agrees.

## 2021-03-22 ENCOUNTER — LAB (OUTPATIENT)
Dept: LAB | Facility: HOSPITAL | Age: 76
End: 2021-03-22

## 2021-03-22 DIAGNOSIS — Z01.818 OTHER SPECIFIED PRE-OPERATIVE EXAMINATION: ICD-10-CM

## 2021-03-22 PROCEDURE — U0004 COV-19 TEST NON-CDC HGH THRU: HCPCS

## 2021-03-22 PROCEDURE — C9803 HOPD COVID-19 SPEC COLLECT: HCPCS

## 2021-03-23 LAB — SARS-COV-2 RNA RESP QL NAA+PROBE: NOT DETECTED

## 2021-03-24 ENCOUNTER — HOSPITAL ENCOUNTER (OUTPATIENT)
Facility: HOSPITAL | Age: 76
Setting detail: HOSPITAL OUTPATIENT SURGERY
Discharge: HOME | End: 2021-03-24
Attending: OBSTETRICS & GYNECOLOGY | Admitting: OBSTETRICS & GYNECOLOGY

## 2021-03-24 ENCOUNTER — ANESTHESIA (OUTPATIENT)
Dept: PERIOP | Facility: HOSPITAL | Age: 76
End: 2021-03-24

## 2021-03-24 ENCOUNTER — ANESTHESIA EVENT (OUTPATIENT)
Dept: PERIOP | Facility: HOSPITAL | Age: 76
End: 2021-03-24

## 2021-03-24 VITALS
HEART RATE: 82 BPM | DIASTOLIC BLOOD PRESSURE: 92 MMHG | OXYGEN SATURATION: 98 % | RESPIRATION RATE: 16 BRPM | SYSTOLIC BLOOD PRESSURE: 116 MMHG | TEMPERATURE: 98 F

## 2021-03-24 DIAGNOSIS — N95.0 POST-MENOPAUSAL BLEEDING: ICD-10-CM

## 2021-03-24 PROCEDURE — 25010000002 PHENYLEPHRINE PER 1 ML: Performed by: NURSE ANESTHETIST, CERTIFIED REGISTERED

## 2021-03-24 PROCEDURE — C1782 MORCELLATOR: HCPCS | Performed by: OBSTETRICS & GYNECOLOGY

## 2021-03-24 PROCEDURE — 25010000002 ONDANSETRON PER 1 MG: Performed by: NURSE ANESTHETIST, CERTIFIED REGISTERED

## 2021-03-24 PROCEDURE — 25010000002 SUCCINYLCHOLINE PER 20 MG: Performed by: NURSE ANESTHETIST, CERTIFIED REGISTERED

## 2021-03-24 PROCEDURE — 25010000002 MIDAZOLAM PER 1 MG: Performed by: ANESTHESIOLOGY

## 2021-03-24 PROCEDURE — 88305 TISSUE EXAM BY PATHOLOGIST: CPT | Performed by: OBSTETRICS & GYNECOLOGY

## 2021-03-24 PROCEDURE — 25010000002 DEXAMETHASONE PER 1 MG: Performed by: NURSE ANESTHETIST, CERTIFIED REGISTERED

## 2021-03-24 PROCEDURE — 25010000002 PROPOFOL 10 MG/ML EMULSION: Performed by: NURSE ANESTHETIST, CERTIFIED REGISTERED

## 2021-03-24 PROCEDURE — 25010000002 FENTANYL CITRATE (PF) 100 MCG/2ML SOLUTION: Performed by: NURSE ANESTHETIST, CERTIFIED REGISTERED

## 2021-03-24 RX ORDER — NALBUPHINE HCL 10 MG/ML
2 AMPUL (ML) INJECTION EVERY 4 HOURS PRN
Status: DISCONTINUED | OUTPATIENT
Start: 2021-03-24 | End: 2021-03-24 | Stop reason: HOSPADM

## 2021-03-24 RX ORDER — ONDANSETRON 2 MG/ML
4 INJECTION INTRAMUSCULAR; INTRAVENOUS ONCE AS NEEDED
Status: DISCONTINUED | OUTPATIENT
Start: 2021-03-24 | End: 2021-03-24 | Stop reason: HOSPADM

## 2021-03-24 RX ORDER — SODIUM CHLORIDE 0.9 % (FLUSH) 0.9 %
10 SYRINGE (ML) INJECTION AS NEEDED
Status: DISCONTINUED | OUTPATIENT
Start: 2021-03-24 | End: 2021-03-24 | Stop reason: HOSPADM

## 2021-03-24 RX ORDER — PROPOFOL 10 MG/ML
VIAL (ML) INTRAVENOUS AS NEEDED
Status: DISCONTINUED | OUTPATIENT
Start: 2021-03-24 | End: 2021-03-24 | Stop reason: SURG

## 2021-03-24 RX ORDER — PROMETHAZINE HYDROCHLORIDE 25 MG/1
25 TABLET ORAL ONCE AS NEEDED
Status: DISCONTINUED | OUTPATIENT
Start: 2021-03-24 | End: 2021-03-24 | Stop reason: HOSPADM

## 2021-03-24 RX ORDER — LIDOCAINE HYDROCHLORIDE 10 MG/ML
0.5 INJECTION, SOLUTION EPIDURAL; INFILTRATION; INTRACAUDAL; PERINEURAL ONCE AS NEEDED
Status: COMPLETED | OUTPATIENT
Start: 2021-03-24 | End: 2021-03-24

## 2021-03-24 RX ORDER — GLYCOPYRROLATE 0.2 MG/ML
INJECTION INTRAMUSCULAR; INTRAVENOUS AS NEEDED
Status: DISCONTINUED | OUTPATIENT
Start: 2021-03-24 | End: 2021-03-24 | Stop reason: SURG

## 2021-03-24 RX ORDER — HYDROCODONE BITARTRATE AND ACETAMINOPHEN 5; 325 MG/1; MG/1
1 TABLET ORAL ONCE AS NEEDED
Status: DISCONTINUED | OUTPATIENT
Start: 2021-03-24 | End: 2021-03-24 | Stop reason: HOSPADM

## 2021-03-24 RX ORDER — FENTANYL CITRATE 50 UG/ML
INJECTION, SOLUTION INTRAMUSCULAR; INTRAVENOUS AS NEEDED
Status: DISCONTINUED | OUTPATIENT
Start: 2021-03-24 | End: 2021-03-24 | Stop reason: SURG

## 2021-03-24 RX ORDER — SODIUM CHLORIDE, SODIUM LACTATE, POTASSIUM CHLORIDE, CALCIUM CHLORIDE 600; 310; 30; 20 MG/100ML; MG/100ML; MG/100ML; MG/100ML
9 INJECTION, SOLUTION INTRAVENOUS CONTINUOUS
Status: DISCONTINUED | OUTPATIENT
Start: 2021-03-24 | End: 2021-03-24 | Stop reason: HOSPADM

## 2021-03-24 RX ORDER — MIDAZOLAM HYDROCHLORIDE 1 MG/ML
0.5 INJECTION INTRAMUSCULAR; INTRAVENOUS
Status: DISCONTINUED | OUTPATIENT
Start: 2021-03-24 | End: 2021-03-24 | Stop reason: HOSPADM

## 2021-03-24 RX ORDER — MAGNESIUM HYDROXIDE 1200 MG/15ML
LIQUID ORAL AS NEEDED
Status: DISCONTINUED | OUTPATIENT
Start: 2021-03-24 | End: 2021-03-24 | Stop reason: HOSPADM

## 2021-03-24 RX ORDER — SODIUM CHLORIDE 0.9 % (FLUSH) 0.9 %
10 SYRINGE (ML) INJECTION EVERY 12 HOURS SCHEDULED
Status: DISCONTINUED | OUTPATIENT
Start: 2021-03-24 | End: 2021-03-24 | Stop reason: HOSPADM

## 2021-03-24 RX ORDER — SUCCINYLCHOLINE CHLORIDE 20 MG/ML
INJECTION INTRAMUSCULAR; INTRAVENOUS AS NEEDED
Status: DISCONTINUED | OUTPATIENT
Start: 2021-03-24 | End: 2021-03-24 | Stop reason: SURG

## 2021-03-24 RX ORDER — HYDRALAZINE HYDROCHLORIDE 20 MG/ML
5 INJECTION INTRAMUSCULAR; INTRAVENOUS
Status: DISCONTINUED | OUTPATIENT
Start: 2021-03-24 | End: 2021-03-24 | Stop reason: HOSPADM

## 2021-03-24 RX ORDER — LIDOCAINE HYDROCHLORIDE 20 MG/ML
INJECTION, SOLUTION INFILTRATION; PERINEURAL AS NEEDED
Status: DISCONTINUED | OUTPATIENT
Start: 2021-03-24 | End: 2021-03-24 | Stop reason: SURG

## 2021-03-24 RX ORDER — ONDANSETRON 2 MG/ML
INJECTION INTRAMUSCULAR; INTRAVENOUS AS NEEDED
Status: DISCONTINUED | OUTPATIENT
Start: 2021-03-24 | End: 2021-03-24 | Stop reason: SURG

## 2021-03-24 RX ORDER — NALOXONE HCL 0.4 MG/ML
0.4 VIAL (ML) INJECTION AS NEEDED
Status: DISCONTINUED | OUTPATIENT
Start: 2021-03-24 | End: 2021-03-24 | Stop reason: HOSPADM

## 2021-03-24 RX ORDER — ACETAMINOPHEN 500 MG
500 TABLET ORAL ONCE
Status: COMPLETED | OUTPATIENT
Start: 2021-03-24 | End: 2021-03-24

## 2021-03-24 RX ORDER — DIPHENHYDRAMINE HYDROCHLORIDE 50 MG/ML
12.5 INJECTION INTRAMUSCULAR; INTRAVENOUS
Status: DISCONTINUED | OUTPATIENT
Start: 2021-03-24 | End: 2021-03-24 | Stop reason: HOSPADM

## 2021-03-24 RX ORDER — DEXAMETHASONE SODIUM PHOSPHATE 10 MG/ML
INJECTION INTRAMUSCULAR; INTRAVENOUS AS NEEDED
Status: DISCONTINUED | OUTPATIENT
Start: 2021-03-24 | End: 2021-03-24 | Stop reason: SURG

## 2021-03-24 RX ORDER — FENTANYL CITRATE 50 UG/ML
50 INJECTION, SOLUTION INTRAMUSCULAR; INTRAVENOUS
Status: DISCONTINUED | OUTPATIENT
Start: 2021-03-24 | End: 2021-03-24 | Stop reason: HOSPADM

## 2021-03-24 RX ORDER — MIDAZOLAM HYDROCHLORIDE 1 MG/ML
1 INJECTION INTRAMUSCULAR; INTRAVENOUS
Status: DISCONTINUED | OUTPATIENT
Start: 2021-03-24 | End: 2021-03-24 | Stop reason: HOSPADM

## 2021-03-24 RX ORDER — NALBUPHINE HCL 10 MG/ML
10 AMPUL (ML) INJECTION EVERY 4 HOURS PRN
Status: DISCONTINUED | OUTPATIENT
Start: 2021-03-24 | End: 2021-03-24 | Stop reason: HOSPADM

## 2021-03-24 RX ORDER — HYDROMORPHONE HYDROCHLORIDE 1 MG/ML
0.25 INJECTION, SOLUTION INTRAMUSCULAR; INTRAVENOUS; SUBCUTANEOUS
Status: DISCONTINUED | OUTPATIENT
Start: 2021-03-24 | End: 2021-03-24 | Stop reason: HOSPADM

## 2021-03-24 RX ORDER — SODIUM CHLORIDE 9 MG/ML
INJECTION, SOLUTION INTRAVENOUS AS NEEDED
Status: DISCONTINUED | OUTPATIENT
Start: 2021-03-24 | End: 2021-03-24 | Stop reason: HOSPADM

## 2021-03-24 RX ADMIN — GLYCOPYRROLATE 0.1 MG: 0.2 INJECTION INTRAMUSCULAR; INTRAVENOUS at 10:13

## 2021-03-24 RX ADMIN — ACETAMINOPHEN 500 MG: 500 TABLET, FILM COATED ORAL at 08:34

## 2021-03-24 RX ADMIN — DEXAMETHASONE SODIUM PHOSPHATE 8 MG: 10 INJECTION INTRAMUSCULAR; INTRAVENOUS at 10:17

## 2021-03-24 RX ADMIN — LIDOCAINE HYDROCHLORIDE 100 MG: 20 INJECTION, SOLUTION INFILTRATION; PERINEURAL at 10:13

## 2021-03-24 RX ADMIN — MIDAZOLAM 0.5 MG: 1 INJECTION INTRAMUSCULAR; INTRAVENOUS at 08:36

## 2021-03-24 RX ADMIN — FENTANYL CITRATE 50 MCG: 50 INJECTION INTRAMUSCULAR; INTRAVENOUS at 10:13

## 2021-03-24 RX ADMIN — SODIUM CHLORIDE, POTASSIUM CHLORIDE, SODIUM LACTATE AND CALCIUM CHLORIDE 9 ML/HR: 600; 310; 30; 20 INJECTION, SOLUTION INTRAVENOUS at 08:20

## 2021-03-24 RX ADMIN — PHENYLEPHRINE HYDROCHLORIDE 100 MCG: 10 INJECTION INTRAVENOUS at 10:45

## 2021-03-24 RX ADMIN — SUCCINYLCHOLINE CHLORIDE 160 MG: 20 INJECTION, SOLUTION INTRAMUSCULAR; INTRAVENOUS; PARENTERAL at 10:13

## 2021-03-24 RX ADMIN — ONDANSETRON 4 MG: 2 INJECTION INTRAMUSCULAR; INTRAVENOUS at 10:21

## 2021-03-24 RX ADMIN — LIDOCAINE HYDROCHLORIDE 0.5 ML: 10 INJECTION, SOLUTION EPIDURAL; INFILTRATION; INTRACAUDAL; PERINEURAL at 08:20

## 2021-03-24 RX ADMIN — PROPOFOL 200 MG: 10 INJECTION, EMULSION INTRAVENOUS at 10:13

## 2021-03-24 RX ADMIN — FENTANYL CITRATE 25 MCG: 50 INJECTION INTRAMUSCULAR; INTRAVENOUS at 10:47

## 2021-03-24 RX ADMIN — PROPOFOL 50 MG: 10 INJECTION, EMULSION INTRAVENOUS at 10:20

## 2021-03-24 RX ADMIN — FENTANYL CITRATE 25 MCG: 50 INJECTION INTRAMUSCULAR; INTRAVENOUS at 11:08

## 2021-03-24 RX ADMIN — PROPOFOL 50 MG: 10 INJECTION, EMULSION INTRAVENOUS at 10:22

## 2021-03-24 NOTE — ANESTHESIA POSTPROCEDURE EVALUATION
Patient: Prema Hawthorne    Procedure Summary     Date: 03/24/21 Room / Location:  [facility]  /  [facility]    Anesthesia Start: 1009 Anesthesia Stop: 1116    Procedure: HYSTEROSCOPY WITH  DILATATION AND CURETTAGE, POLYPECTOMY WITH MYOSURE (N/A Uterus) Diagnosis:     Surgeons: Christiane Goldman MD Provider: Murali Colindres MD    Anesthesia Type: general ASA Status: 3          Anesthesia Type: general    Vitals  Vitals Value Taken Time   /90 03/24/21 1200   Temp 36.7 °C (98 °F) 03/24/21 1200   Pulse 72 03/24/21 1200   Resp 20 03/24/21 1200   SpO2 96 % 03/24/21 1204   Vitals shown include unvalidated device data.        Post Anesthesia Care and Evaluation    Patient location during evaluation: PHASE II  Patient participation: complete - patient participated  Level of consciousness: awake and alert  Pain management: adequate  Airway patency: patent  Anesthetic complications: No anesthetic complications    Cardiovascular status: acceptable  Respiratory status: acceptable  Hydration status: acceptable    Comments: /92 (BP Location: Right arm, Patient Position: Lying)   Pulse 82   Temp 36.7 °C (98 °F) (Temporal)   Resp 16   SpO2 98%

## 2021-03-24 NOTE — ANESTHESIA PROCEDURE NOTES
Airway  Urgency: elective    Date/Time: 3/24/2021 10:15 AM  Airway not difficult    General Information and Staff    Patient location during procedure: OR  Anesthesiologist: Murali Colindres MD  CRNA: Gwen Mcclure CRNA    Indications and Patient Condition  Indications for airway management: airway protection    Preoxygenated: yes  MILS maintained throughout  Mask difficulty assessment: 2 - vent by mask + OA or adjuvant +/- NMBA    Final Airway Details  Final airway type: endotracheal airway      Successful airway: ETT  Cuffed: yes   Successful intubation technique: direct laryngoscopy  Facilitating devices/methods: intubating stylet  Endotracheal tube insertion site: oral  Blade: West  Blade size: 4  ETT size (mm): 7.0  Cormack-Lehane Classification: grade I - full view of glottis  Placement verified by: chest auscultation and capnometry   Cuff volume (mL): 8  Measured from: teeth  ETT/EBT  to teeth (cm): 2  Number of attempts at approach: 1  Assessment: lips, teeth, and gum same as pre-op and atraumatic intubation

## 2021-03-24 NOTE — ANESTHESIA PREPROCEDURE EVALUATION
Anesthesia Evaluation     history of anesthetic complications: PONV               Airway   Mallampati: II  TM distance: >3 FB  Neck ROM: full  No difficulty expected  Dental - normal exam     Pulmonary - normal exam   (+) shortness of breath, sleep apnea,   Cardiovascular - normal exam    ECG reviewed    (+) hypertension well controlled, hyperlipidemia,     ROS comment: A fib & ASI no change since perfusion scan  Neg perfusion scan    Neuro/Psych  GI/Hepatic/Renal/Endo    (+) morbid obesity,      Musculoskeletal     Abdominal    Substance History      OB/GYN          Other                        Anesthesia Plan    ASA 3     general   (  D/W R&B of GA including but not limited to: heart, lung, liver, kidney, neurologic problems, positioning injuries, dental damage, corneal abrasion and TMJ.  .)  intravenous induction     Anesthetic plan, all risks, benefits, and alternatives have been provided, discussed and informed consent has been obtained with: patient.

## 2021-03-25 DIAGNOSIS — I10 ESSENTIAL HYPERTENSION: ICD-10-CM

## 2021-03-25 DIAGNOSIS — I50.22 SYSTOLIC CHF, CHRONIC (HCC): ICD-10-CM

## 2021-03-25 LAB
CYTO UR: NORMAL
LAB AP CASE REPORT: NORMAL
LAB AP DIAGNOSIS COMMENT: NORMAL
LAB AP INTRADEPARTMENTAL CONSULT: NORMAL
PATH REPORT.FINAL DX SPEC: NORMAL
PATH REPORT.GROSS SPEC: NORMAL

## 2021-03-25 NOTE — TELEPHONE ENCOUNTER
Caller: Prema Hawthorne    Relationship: Self    Best call back number:[phone]    Medication needed:   Requested Prescriptions     Pending Prescriptions Disp Refills   • ramipril (ALTACE) 2.5 MG capsule 30 capsule 3     Sig: Take 1 capsule by mouth Daily.       When do you need the refill by: 03/26    What additional details did the patient provide when requesting the medication: PATIENT IS GOING OUT OF TOWN FOR A WHILE FOR VACATION AND IS REQUESTING A 90 DAY SUPPLY.    Does the patient have less than a 3 day supply:  [x] Yes  [] No    What is the patient's preferred pharmacy: [facility] #[id number] [facility] [address] [facility] [address] [facility] [phone] [facility] [phone]

## 2021-03-26 RX ORDER — RAMIPRIL 2.5 MG/1
2.5 CAPSULE ORAL DAILY
Qty: 90 CAPSULE | Refills: 1 | Status: SHIPPED | OUTPATIENT
Start: 2021-03-26 | End: 2021-07-27 | Stop reason: SDUPTHER

## 2021-04-28 ENCOUNTER — TELEPHONE (OUTPATIENT)
Dept: CARDIOLOGY | Facility: CLINIC | Age: 76
End: 2021-04-28

## 2021-04-28 NOTE — TELEPHONE ENCOUNTER
To whom it may concern:    Prema Hawthorne is followed in our office for atrial fibrillation and hypertension.  This has been very stable.  She may proceed to surgery without further testing.  Would recommend that she hold her Xarelto 3 days prior to surgery and resume when able after surgery.    Should have any questions, please free to contact me.    Sincerely    Brit Mejia MD      Pt called stating that she is scheduled to have   DAVINCI XI TOTAL LAPAROSCOPIC HYSTERECTOMY, DAVINCI XI LAPAROSCOPIC SALPINGO OPPHORECTOMY,CYSTOSCOPY,SENTINEL NODE,POSSIBLE DAVINCI XI LYMPH NODE DISSECTION, ABDOMEN AND OR PELVIS, POSSIBLE LAPAROTOMY  With Dr. Basilio on 5/26/2021 and is needing cardiac clearance

## 2021-05-07 ENCOUNTER — TELEPHONE (OUTPATIENT)
Dept: FAMILY MEDICINE CLINIC | Facility: CLINIC | Age: 76
End: 2021-05-07

## 2021-05-07 NOTE — TELEPHONE ENCOUNTER
PATIENT WOULD LIKE TO LET DR. BRUMFIELD KNOW SHE IS CANCELLING HER APT ON 05/14 WHERE THEY WERE GOING TO CHECK HER CHOLESTEROL AND THINGS. SHE WAS JUST DIAGNOSED WITH CANCER OF THE UTERUS AND HAS SURGERY SCHEDULED FOR 05/26 AND THE NEXT COUPLE WEEKS SHE WILL BE GOING THROUGH SOME PRE TESTING FOR THAT. SHE HAS A 6 MO F/U SCHEDULED WITH DR BRUMFIELD IN July SHE WILL COME TO THAT ONE FOR SURE.     PATIENT CAN BE REACHED AT: [phone]

## 2021-07-16 ENCOUNTER — OFFICE VISIT (OUTPATIENT)
Dept: FAMILY MEDICINE CLINIC | Facility: CLINIC | Age: 76
End: 2021-07-16

## 2021-07-16 VITALS
HEART RATE: 42 BPM | RESPIRATION RATE: 16 BRPM | BODY MASS INDEX: 48.02 KG/M2 | HEIGHT: 63 IN | WEIGHT: 271 LBS | DIASTOLIC BLOOD PRESSURE: 99 MMHG | SYSTOLIC BLOOD PRESSURE: 138 MMHG | OXYGEN SATURATION: 98 % | TEMPERATURE: 97.3 F

## 2021-07-16 DIAGNOSIS — I10 ESSENTIAL HYPERTENSION: Primary | ICD-10-CM

## 2021-07-16 DIAGNOSIS — T81.89XA NON-HEALING SURGICAL WOUND, INITIAL ENCOUNTER: ICD-10-CM

## 2021-07-16 DIAGNOSIS — E78.5 HYPERLIPIDEMIA, UNSPECIFIED HYPERLIPIDEMIA TYPE: ICD-10-CM

## 2021-07-16 PROCEDURE — 99213 OFFICE O/P EST LOW 20 MIN: CPT | Performed by: FAMILY MEDICINE

## 2021-07-16 NOTE — PROGRESS NOTES
Subjective   Prema Hawthorne is a 75 y.o. female.     History of Present Illness     75-year-old female presents today for 6-month follow-up.    Hypertension: Ramipril 2.5 mg a day.  Metoprolol succinate  mg twice daily.  Blood pressure in the office today 138/99.  Blood pressure ranges at home normal.  Denies chest pain shortness of breath headache or vision changes. HR is 42. Recheck 52.    Since last visit diagnosed with uterine cancer.  Following with GYN oncology.    Due for lipid panel recheck.    The following portions of the patient's history were reviewed and updated as appropriate: allergies, current medications, past family history, past medical history, past social history, past surgical history and problem list.    Review of Systems   Constitutional: Negative for chills and fever.   HENT: Negative for congestion.    Respiratory: Negative for cough and shortness of breath.    Cardiovascular: Negative for chest pain, palpitations and leg swelling.   Gastrointestinal: Negative for abdominal pain, diarrhea and vomiting.         Objective   Physical Exam  Constitutional:       Appearance: She is well-developed.   HENT:      Head: Normocephalic and atraumatic.      Mouth/Throat:      Pharynx: Uvula midline.   Eyes:      Pupils: Pupils are equal, round, and reactive to light.   Cardiovascular:      Rate and Rhythm: Normal rate and regular rhythm.      Heart sounds: No murmur heard.     Pulmonary:      Effort: Pulmonary effort is normal. No respiratory distress.      Breath sounds: Normal breath sounds. No stridor. No wheezing or rales.   Skin:     General: Skin is warm.   Neurological:      Mental Status: She is alert.   Psychiatric:         Behavior: Behavior normal.                 Assessment/Plan     Diagnoses and all orders for this visit:    1. Essential hypertension (Primary)    2. Hyperlipidemia, unspecified hyperlipidemia type    3. Non-healing surgical wound, initial encounter  -     Ambulatory  Referral to Wound Clinic    Follow up lipid panel.   Cont care per Urology and Cardio.

## 2021-07-20 ENCOUNTER — TELEPHONE (OUTPATIENT)
Dept: CARDIOLOGY | Facility: CLINIC | Age: 76
End: 2021-07-20

## 2021-07-20 NOTE — TELEPHONE ENCOUNTER
Alice with Dr. Galvez's office with [facility] called needing cardiac clearance for pt to hold her Xarelto 3 days prior to having a cystobladder biopsy done

## 2021-07-23 RX ORDER — METOPROLOL SUCCINATE 100 MG/1
TABLET, EXTENDED RELEASE ORAL
Qty: 180 TABLET | Refills: 3 | Status: SHIPPED | OUTPATIENT
Start: 2021-07-23 | End: 2021-07-27 | Stop reason: SDUPTHER

## 2021-07-27 ENCOUNTER — OFFICE VISIT (OUTPATIENT)
Dept: CARDIOLOGY | Facility: CLINIC | Age: 76
End: 2021-07-27

## 2021-07-27 VITALS
SYSTOLIC BLOOD PRESSURE: 136 MMHG | HEART RATE: 83 BPM | HEIGHT: 63 IN | OXYGEN SATURATION: 97 % | DIASTOLIC BLOOD PRESSURE: 84 MMHG | BODY MASS INDEX: 48.73 KG/M2 | RESPIRATION RATE: 16 BRPM | WEIGHT: 275 LBS

## 2021-07-27 DIAGNOSIS — E78.2 MIXED HYPERLIPIDEMIA: ICD-10-CM

## 2021-07-27 DIAGNOSIS — I49.3 VENTRICULAR PREMATURE BEATS: ICD-10-CM

## 2021-07-27 DIAGNOSIS — I10 ESSENTIAL HYPERTENSION: ICD-10-CM

## 2021-07-27 DIAGNOSIS — Z99.89 OSA ON CPAP: ICD-10-CM

## 2021-07-27 DIAGNOSIS — I48.11 LONGSTANDING PERSISTENT ATRIAL FIBRILLATION (HCC): Primary | ICD-10-CM

## 2021-07-27 DIAGNOSIS — G47.33 OSA ON CPAP: ICD-10-CM

## 2021-07-27 DIAGNOSIS — E66.01 MORBID OBESITY (HCC): ICD-10-CM

## 2021-07-27 DIAGNOSIS — I50.22 SYSTOLIC CHF, CHRONIC (HCC): ICD-10-CM

## 2021-07-27 PROCEDURE — 93000 ELECTROCARDIOGRAM COMPLETE: CPT | Performed by: NURSE PRACTITIONER

## 2021-07-27 PROCEDURE — 99214 OFFICE O/P EST MOD 30 MIN: CPT | Performed by: NURSE PRACTITIONER

## 2021-07-27 RX ORDER — RAMIPRIL 2.5 MG/1
2.5 CAPSULE ORAL DAILY
Qty: 90 CAPSULE | Refills: 1 | Status: SHIPPED | OUTPATIENT
Start: 2021-07-27 | End: 2022-03-02 | Stop reason: SDUPTHER

## 2021-07-27 RX ORDER — METOPROLOL SUCCINATE 100 MG/1
100 TABLET, EXTENDED RELEASE ORAL 2 TIMES DAILY
Qty: 180 TABLET | Refills: 3 | Status: SHIPPED | OUTPATIENT
Start: 2021-07-27 | End: 2022-08-18

## 2021-07-27 NOTE — PROGRESS NOTES
Date of Office Visit: 2021  Encounter Provider: MARCELINO Goodwin  Place of Service: [facility] CARDIOLOGY  Patient Name: Prema Hawthorne  :1945      Patient ID:  Prema Hawthorne is a 76 y.o. female is here for followup for longstanding paroxysmal atrial fibrillation, ventricular premature contractions, systolic congestive heart failure, mixed hyperlipidemia.        History of Present Illness    She has a known history of hypertension, obesity, and obstructive sleep apnea-compliant with CPAP.     In , she was diagnosed with atrial fibrillation.  Echocardiogram at that time revealed an EF of 35% and cardiac catheterization showed normal coronary arteries.  Elective cardioversion was completed, but then she developed atrial fibrillation again. In May 2011, the echocardiogram was repeated and her EF improved to 67%.  She has remained in persistent atrial fibrillation that is treated with metoprolol and dabigatran for stroke prevention.     In 2019, she was experiencing dyspnea and diaphoresis.  She reported variability in her heart rate at home which was running 40s to 117 bpm.  She was noted to have frequent PVCs most likely contributing to the abnormal calculation of her heart rate.  A Holter monitor was worn 19 which showed atrial fibrillation with average heart rate 90, minimum 57, maximum 152 and frequent PVCs.  I increased her metoprolol to 100 mg twice per day.      On 2019 an echocardiogram was obtained with the following results: EF 51-55%, left ventricle mild to moderately dilated, mild concentric hypertrophy, moderate septal hypokinesis, left atrium severely dilated, right atrium moderately to severely dilated, trace aortic, mitral, pulmonic, and tricuspid regurgitation. On 3/19/2019 a cardiac PET scan was completed which showed no evidence of ischemia.     In 2019, she followed up in the office with Dr. Mejia and was doing well at  that time.    She followed up with MARCELINO Perry 7/27/2020 for 1 year follow-up.  She was doing well from a cardiac standpoint at that time.  Her blood pressure was well controlled.  No changes were made to her regimen and she was recommended for 1 year follow-up.    She is here today for an annual cardiac follow-up.  Since her last visit she was diagnosed with cancer of the endometrial lining of her uterus.  She underwent surgical removal 5/28/2021.  She has been cleared from an oncology standpoint.  She did not have to receive any chemotherapy or radiation.     Labs from 3/9/2021 revealed a normal CMP, 3/18/2021 she had a normal CBC, 1/20/2021 she had a normal TSH 2.250, total cholesterol 213, HDL 55, , triglycerides 84.     He is here today to follow-up.  Overall she has been doing well from a cardiac standpoint.  She is not had any chest pain or pressure.  She has some shortness of breath with exertion.  She has not had any lower extremity edema.  She denies any lightheadedness, dizziness, syncope, or near syncope.  She has not felt her heart racing or skipping despite her longstanding PAF.  She is scheduled to have surgery of her bladder next week with Dr. Galvez at [facility] in [place].  She is having polyps removed and a biopsy taken.  They were recommended no blood thinner for 3 days prior to surgery and she will restart as soon as she is able to per their recommendations postoperatively.  She has quite a bit of pain in her knees.  She is losing her primary care provider as she is going to only sleep medicine starting September 1, 2021.  We will take over her refills on her lisinopril. She plans to go to [place] in October with her  and is hoping that she is healthy enough to be able to do that.  4 of her 5 laparoscopic wounds from 5/26/2021 have not healed.  They are covered in a thick dark scab and has some thick yellow oozing from them.  She has been referred to the wound clinic  previously, but did not follow-up with them.  I referred her back to them.  I did try to reach out to Dr. Basilio, but was unable to get a hold of him.    Past Medical History:   Diagnosis Date   • Arthritis    • Atrial fibrillation (CMS/HCC)     with rapid ventricular response (CHRONIC)   • Cardiomyopathy (CMS/HCC)     nonischemic   • Essential hypertension    • Hyperlipidemia    • Knee pain, bilateral     GETS INJECTIONS EVERY 3 MONTHS   • Morbid obesity (CMS/HCC)    • On anticoagulant therapy    • DAVID (obstructive sleep apnea) 2011    CPAP nightly   • PONV (postoperative nausea and vomiting)    • Postmenopausal bleeding    • PVC (premature ventricular contraction) 03/25/2016   • Shortness of breath     EXERTION   • Systolic heart failure (CMS/HCC)          Past Surgical History:   Procedure Laterality Date   • COLONOSCOPY     • D & C HYSTEROSCOPY N/A 3/24/2021    Procedure: HYSTEROSCOPY WITH  DILATATION AND CURETTAGE, POLYPECTOMY WITH MYOSURE;  Surgeon: Christiane Goldman MD;  Location: [facility] OR [facility];  Service: Obstetrics/Gynecology;  Laterality: N/A;       Current Outpatient Medications on File Prior to Visit   Medication Sig Dispense Refill   • Coenzyme Q10 (COQ10 PO) Take 1 capsule by mouth Daily. PT HOLDING FOR SURGERY     • metoprolol succinate XL (TOPROL-XL) 100 MG 24 hr tablet TAKE 1 TABLET TWICE A  tablet 3   • ramipril (ALTACE) 2.5 MG capsule Take 1 capsule by mouth Daily. 90 capsule 1   • rivaroxaban (Xarelto) 20 MG tablet Take 1 tablet by mouth Daily With Dinner. (Patient taking differently: Take 20 mg by mouth Daily With Dinner. PT TO HOLD 48 HOURS PRIOR TO SURGERY) 90 tablet 3   • Turmeric Curcumin 500 MG capsule Take 1 tablet by mouth Daily. PT HOLDING FOR SURGERY     • vitamin D3 125 MCG (5000 UT) capsule capsule Take 5,000 Units by mouth Daily.       No current facility-administered medications on file prior to visit.       Social History     Socioeconomic History   • Marital status:   "    Spouse name: Not on file   • Number of children: Not on file   • Years of education: Not on file   • Highest education level: Not on file   Tobacco Use   • Smoking status: Never Smoker   • Smokeless tobacco: Never Used   Vaping Use   • Vaping Use: Never used   Substance and Sexual Activity   • Alcohol use: Yes     Comment: Occ//caffeine use: 2 cups daily   • Drug use: No     Comment: caffeine use    • Sexual activity: Defer           Review of Systems   Constitutional: Negative for chills, decreased appetite, diaphoresis, fever and malaise/fatigue.   HENT: Negative for nosebleeds.    Eyes: Negative for blurred vision.   Cardiovascular: Negative for chest pain, claudication, cyanosis, dyspnea on exertion, irregular heartbeat, leg swelling, near-syncope, orthopnea, palpitations, paroxysmal nocturnal dyspnea and syncope.   Respiratory: Negative for cough, hemoptysis, shortness of breath, sleep disturbances due to breathing, snoring and wheezing.    Hematologic/Lymphatic: Negative for bleeding problem. Does not bruise/bleed easily.   Musculoskeletal: Positive for joint pain (knees). Negative for falls.   Gastrointestinal: Negative for heartburn.   Neurological: Negative for excessive daytime sleepiness, dizziness, headaches, light-headedness, numbness and weakness.       Procedures    ECG 12 Lead    Date/Time: 7/27/2021 4:42 PM  Performed by: Christelle Londono APRN  Authorized by: Christelle Londono APRN   Comparison: compared with previous ECG from 3/18/2021  Rhythm: atrial fibrillation  Rate: normal  BPM: 83  QRS axis: normal    Clinical impression: abnormal EKG                Objective:      Vitals:    07/27/21 1314   BP: 136/84   BP Location: Left arm   Patient Position: Sitting   Cuff Size: Adult   Pulse: 83   Resp: 16   SpO2: 97%   Weight: 125 kg (275 lb)   Height: 160 cm (63\")     Body mass index is 48.71 kg/m².    Vitals reviewed.   Constitutional:       Appearance: Normal and healthy appearance. " Well-developed, well-groomed and not in distress.   Eyes:      Conjunctiva/sclera: Conjunctivae normal.   Neck:      Vascular: No carotid bruit. JVD normal.   Pulmonary:      Effort: Pulmonary effort is normal.      Breath sounds: Normal breath sounds.   Cardiovascular:      PMI at left midclavicular line. Normal rate. Irregularly irregular rhythm.      Murmurs: There is no murmur.   Pulses:     Intact distal pulses.   Edema:     Peripheral edema absent.   Abdominal:      Palpations: Abdomen is soft.      Tenderness: There is no abdominal tenderness.   Skin:     General: Skin is warm and dry.      Findings: Wound present.      Comments: Wounds X4 from laprascopic surgery 5/28/2021 slow healing with thick yellow drainage.    Neurological:      Mental Status: Alert and oriented to person, place and time.   Psychiatric:         Attention and Perception: Attention and perception normal.         Behavior: Behavior is cooperative.         Lab Review:       Assessment:      Diagnosis Plan   1. Longstanding persistent atrial fibrillation (CMS/HCC)     2. Essential hypertension     3. Mixed hyperlipidemia     4. Ventricular premature beats     5. Morbid obesity (CMS/HCC)     6. DAVID on CPAP       1.   Persistent atrial fibrillation.  She remains asymptomatic.  Rate controlled on metoprolol succinate 100 mg twice a day and Pradaxa.  2. Systolic heart failure, ejection fraction has normalized.  3. Hypertension blood pressure well controlled.  4. Hyperlipidemia total cholesterol and LDL were elevated on her last lipid panel.  5. Obstructive sleep apnea compliant with CPAP.  6. Morbid obesity BMI 48.71.  Has not been able to exercise due to wounds on her stomach.  She does not tolerate the recumbent bike or walking due to pain in her knees.  She receives injections to them.  7. Slow healing laparoscopic wounds.  They are covered in a thick dark scab with redness around them.  There is yellow oozing from the bandages.  She has to  keep them covered to keep oozing off of her close.             Plan:       From a cardiac standpoint she is doing well.  She is looking for a new primary care provider.  She has some nonhealing wounds from laparoscopic surgery from her cancer surge  I tried to reach out to Dr. Basilio, but was unable to get a hold of him.  I told her to call the wound clinic and see if she can get an appointment with them as she had previously been referred to them.  She will follow-up with Dr. Mejia in 1 year.  We will take over the refilling of her ramipril as her primary care provider previously handled this but she is about out of refills.  Refill sent on metoprolol and ramipril today.

## 2021-08-03 ENCOUNTER — LAB REQUISITION (OUTPATIENT)
Dept: LAB | Facility: HOSPITAL | Age: 76
End: 2021-08-03

## 2021-08-03 DIAGNOSIS — N32.9 BLADDER DISORDER, UNSPECIFIED: ICD-10-CM

## 2021-08-03 PROCEDURE — 88305 TISSUE EXAM BY PATHOLOGIST: CPT | Performed by: UROLOGY

## 2021-08-04 ENCOUNTER — TELEPHONE (OUTPATIENT)
Dept: FAMILY MEDICINE CLINIC | Facility: CLINIC | Age: 76
End: 2021-08-04

## 2021-08-04 LAB
LAB AP CASE REPORT: NORMAL
PATH REPORT.FINAL DX SPEC: NORMAL
PATH REPORT.GROSS SPEC: NORMAL

## 2021-08-04 NOTE — TELEPHONE ENCOUNTER
Caller: Prema Hawthorne    Relationship: Self    Best call back number: [phone]    What form or medical record are you requesting: JURY DUTY EXEMPTION    Who is requesting this form or medical record from you: [facility]    How would you like to receive the form or medical records (pick-up, mail, fax): [employer] UPLOAD AND MAIL TO HOME ADDRESS    Timeframe paperwork needed: BY Friday 08/06    Additional notes: PATIENT IS NEEDING A JURY DUTY EXEMPTION LETTER TO BE EXCUSED PERMANENTLY DUE TO PERMANENT MEDICAL CONDITION. PATIENT STATES SHE HAS RECENTLY HAD A HYSTERECTOMY AND BLADDER SURGERY YESTERDAY 08/03 AND HAS PERMANENT ISSUE WITH HER KNEES AND CANNOT WALK OR STAND LONG DISTANCES.

## 2021-08-04 NOTE — TELEPHONE ENCOUNTER
Set up telephone visit with me to discuss in more detail and so I can write the appropriate letter.

## 2021-08-09 ENCOUNTER — OFFICE VISIT (OUTPATIENT)
Dept: FAMILY MEDICINE CLINIC | Facility: CLINIC | Age: 76
End: 2021-08-09

## 2021-08-09 DIAGNOSIS — M19.90 OSTEOARTHRITIS, UNSPECIFIED OSTEOARTHRITIS TYPE, UNSPECIFIED SITE: ICD-10-CM

## 2021-08-09 DIAGNOSIS — Z02.89 ENCOUNTER TO OBTAIN EXCUSE FROM WORK: Primary | ICD-10-CM

## 2021-08-09 DIAGNOSIS — T81.89XA NON-HEALING SURGICAL WOUND, INITIAL ENCOUNTER: ICD-10-CM

## 2021-08-09 PROCEDURE — 99442 PR PHYS/QHP TELEPHONE EVALUATION 11-20 MIN: CPT | Performed by: FAMILY MEDICINE

## 2021-08-09 NOTE — PROGRESS NOTES
Prema Hawthorne    1945  [id number]  Time spent reviewing E-Visit Questionnaire-5-10 minutes.  I have reviewed the e-Visit questionnaire and patient's answers, my assessment and plan documented below.    HPI    This visit was completed via telephone due to the restriction of the COVID-19 pandemic.  All issues as below were discussed and addressed but no physical exam was performed.  It was felt that the patient should be evaluated in clinic and they were directed there.  The patient verbally consented to visit.    76-year-old female presents today via telephone visit to discuss concern regarding jury duty exemption.    Patient recently had a hysterectomy and bladder surgery on 8/3/2021.  Has longstanding history of osteoarthritis with difficulty with ambulation in terms of walking and standing for long periods of time.    Patient is recently postop from a bladder surgery has several follow-ups coming up with specialist including her urologist, her orthopedic doctor for steroid shots every 3 months.  She also has a wound care appointment coming up.    Diagnoses and all orders for this visit:    1. Encounter to obtain excuse from work (Primary)    2. Non-healing surgical wound, initial encounter    3. Osteoarthritis, unspecified osteoarthritis type, unspecified site    Will place letter in chart to permanently exempt patient from jury duty.  Will mail to patient.    Time spent during visit: 15 minutes.    Any medications prescribed have been sent electronically to   [facility] - [place], [place] - [address] [place] [address] - [phone] PH - [phone] FX  [address] [facility] [address]  Phone: [phone] Fax: [phone]    [facility] #[id number] - [place], [place] - [address] AT [facility] [address] & [facility] - [phone]  - [phone] FX  [address]  [facility] [address]  Phone: [phone] Fax: [phone]        Jennifer Taylor MD  08/09/21  15:39  EDT

## 2021-08-11 ENCOUNTER — OFFICE VISIT (OUTPATIENT)
Dept: WOUND CARE | Facility: HOSPITAL | Age: 76
End: 2021-08-11

## 2021-08-11 PROCEDURE — G0463 HOSPITAL OUTPT CLINIC VISIT: HCPCS

## 2021-08-12 ENCOUNTER — TELEPHONE (OUTPATIENT)
Dept: FAMILY MEDICINE CLINIC | Facility: CLINIC | Age: 76
End: 2021-08-12

## 2021-08-12 NOTE — TELEPHONE ENCOUNTER
Done, thanks  
Pt calling letter for excuse for jury duty.  Would like to  today.  Please call patient when ready, and she will come by, and .  
Plan: Follow-up in 2-3 weeks. If not improved, will biopsy. Patient given business card for Dr. Selby at [facility] for allergy testing if symptoms persist and biopsy non-specific
Modify Regimen: Zyrtec up to 4 times a day as needed for itch
Detail Level: Zone
Initiate Treatment: Clobetasol on arms and chest twice per day, and Hydrocortisone 2.5% on face twice per day
Samples Given: Cetaphil/Cerave sensitive skin moisturizers to apply twice per day

## 2021-08-18 ENCOUNTER — OFFICE VISIT (OUTPATIENT)
Dept: WOUND CARE | Facility: HOSPITAL | Age: 76
End: 2021-08-18

## 2021-08-18 PROCEDURE — G0463 HOSPITAL OUTPT CLINIC VISIT: HCPCS

## 2021-08-24 DIAGNOSIS — I50.22 SYSTOLIC CHF, CHRONIC (HCC): ICD-10-CM

## 2021-08-24 DIAGNOSIS — I10 ESSENTIAL HYPERTENSION: ICD-10-CM

## 2021-08-24 RX ORDER — RAMIPRIL 2.5 MG/1
2.5 CAPSULE ORAL DAILY
Qty: 90 CAPSULE | Refills: 1 | OUTPATIENT
Start: 2021-08-24

## 2021-08-24 NOTE — TELEPHONE ENCOUNTER
Caller: Prema Hawthorne    Relationship: Self    Best call back number: [phone]    Medication needed:   Requested Prescriptions     Pending Prescriptions Disp Refills   • ramipril (ALTACE) 2.5 MG capsule 90 capsule 1     Sig: Take 1 capsule by mouth Daily.       When do you need the refill by: 09/23    What additional details did the patient provide when requesting the medication: PATIENT HAS OVER A MONTH SUPPLY    Does the patient have less than a 3 day supply:  [] Yes  [x] No    What is the patient's preferred pharmacy: [facility] #[id number] [facility] [address] [facility] [address] [facility] [phone] [facility] [phone]

## 2021-08-25 ENCOUNTER — OFFICE VISIT (OUTPATIENT)
Dept: WOUND CARE | Facility: HOSPITAL | Age: 76
End: 2021-08-25

## 2021-08-25 PROCEDURE — G0463 HOSPITAL OUTPT CLINIC VISIT: HCPCS

## 2021-09-01 ENCOUNTER — APPOINTMENT (OUTPATIENT)
Dept: WOUND CARE | Facility: HOSPITAL | Age: 76
End: 2021-09-01

## 2021-11-29 ENCOUNTER — OFFICE VISIT (OUTPATIENT)
Dept: FAMILY MEDICINE CLINIC | Facility: CLINIC | Age: 76
End: 2021-11-29

## 2021-11-29 VITALS
BODY MASS INDEX: 48.71 KG/M2 | TEMPERATURE: 97.3 F | RESPIRATION RATE: 18 BRPM | HEIGHT: 63 IN | OXYGEN SATURATION: 98 % | SYSTOLIC BLOOD PRESSURE: 130 MMHG | DIASTOLIC BLOOD PRESSURE: 90 MMHG | HEART RATE: 77 BPM

## 2021-11-29 DIAGNOSIS — E78.2 MIXED HYPERLIPIDEMIA: ICD-10-CM

## 2021-11-29 DIAGNOSIS — I10 PRIMARY HYPERTENSION: Primary | ICD-10-CM

## 2021-11-29 PROBLEM — N95.0 POST-MENOPAUSAL BLEEDING: Status: ACTIVE | Noted: 2021-04-23

## 2021-11-29 PROBLEM — C54.1 ENDOMETRIAL SARCOMA: Status: ACTIVE | Noted: 2021-05-26

## 2021-11-29 PROBLEM — C54.1 ENDOMETRIAL ADENOCARCINOMA (HCC): Status: ACTIVE | Noted: 2021-03-24

## 2021-11-29 PROCEDURE — 99213 OFFICE O/P EST LOW 20 MIN: CPT | Performed by: FAMILY MEDICINE

## 2021-11-29 PROCEDURE — 90732 PPSV23 VACC 2 YRS+ SUBQ/IM: CPT | Performed by: FAMILY MEDICINE

## 2021-11-29 PROCEDURE — G0009 ADMIN PNEUMOCOCCAL VACCINE: HCPCS | Performed by: FAMILY MEDICINE

## 2022-03-02 DIAGNOSIS — I50.22 SYSTOLIC CHF, CHRONIC: ICD-10-CM

## 2022-03-02 DIAGNOSIS — I10 ESSENTIAL HYPERTENSION: ICD-10-CM

## 2022-03-02 NOTE — TELEPHONE ENCOUNTER
Being RM is out of the office, could you please address this message?    Patient called and requested a refill on her Ramipril 2.5 mg #90.      Last OV- 7/27/21-TR  Next OV-07/26/22-RM    Plan:       From a cardiac standpoint she is doing well.  She is looking for a new primary care provider.  She has some nonhealing wounds from laparoscopic surgery from her cancer surge  I tried to reach out to Dr. Basilio, but was unable to get a hold of him.  I told her to call the wound clinic and see if she can get an appointment with them as she had previously been referred to them.  She will follow-up with Dr. Mejia in 1 year.  We will take over the refilling of her ramipril as her primary care provider previously handled this but she is about out of refills.  Refill sent on metoprolol and ramipril today.    CB: [phone]  Pharmacy: [facility]    Phelicia

## 2022-03-03 RX ORDER — RAMIPRIL 2.5 MG/1
2.5 CAPSULE ORAL DAILY
Qty: 90 CAPSULE | Refills: 3 | Status: SHIPPED | OUTPATIENT
Start: 2022-03-03 | End: 2023-02-15 | Stop reason: SDUPTHER

## 2022-03-10 ENCOUNTER — OFFICE VISIT (OUTPATIENT)
Dept: FAMILY MEDICINE CLINIC | Facility: CLINIC | Age: 77
End: 2022-03-10

## 2022-03-10 VITALS
SYSTOLIC BLOOD PRESSURE: 136 MMHG | OXYGEN SATURATION: 97 % | BODY MASS INDEX: 48.71 KG/M2 | DIASTOLIC BLOOD PRESSURE: 68 MMHG | RESPIRATION RATE: 19 BRPM | HEART RATE: 103 BPM | HEIGHT: 63 IN | TEMPERATURE: 96.3 F

## 2022-03-10 DIAGNOSIS — Z00.00 MEDICARE ANNUAL WELLNESS VISIT, SUBSEQUENT: Primary | ICD-10-CM

## 2022-03-10 DIAGNOSIS — E87.5 HYPERKALEMIA: ICD-10-CM

## 2022-03-10 PROCEDURE — 1170F FXNL STATUS ASSESSED: CPT | Performed by: FAMILY MEDICINE

## 2022-03-10 PROCEDURE — G0439 PPPS, SUBSEQ VISIT: HCPCS | Performed by: FAMILY MEDICINE

## 2022-03-10 PROCEDURE — 1160F RVW MEDS BY RX/DR IN RCRD: CPT | Performed by: FAMILY MEDICINE

## 2022-03-10 PROCEDURE — 96160 PT-FOCUSED HLTH RISK ASSMT: CPT | Performed by: FAMILY MEDICINE

## 2022-03-10 RX ORDER — MULTIPLE VITAMINS W/ MINERALS TAB 9MG-400MCG
1 TAB ORAL DAILY
COMMUNITY
End: 2022-07-26 | Stop reason: ALTCHOICE

## 2022-03-11 ENCOUNTER — HOSPITAL ENCOUNTER (EMERGENCY)
Facility: HOSPITAL | Age: 77
Discharge: HOME | End: 2022-03-11
Attending: EMERGENCY MEDICINE | Admitting: EMERGENCY MEDICINE

## 2022-03-11 VITALS
SYSTOLIC BLOOD PRESSURE: 140 MMHG | RESPIRATION RATE: 18 BRPM | HEIGHT: 63 IN | OXYGEN SATURATION: 94 % | DIASTOLIC BLOOD PRESSURE: 82 MMHG | TEMPERATURE: 96.7 F | BODY MASS INDEX: 48.71 KG/M2 | HEART RATE: 84 BPM

## 2022-03-11 DIAGNOSIS — E87.5 HYPERKALEMIA: Primary | ICD-10-CM

## 2022-03-11 DIAGNOSIS — Z79.01 ANTICOAGULATED BY ANTICOAGULATION TREATMENT: ICD-10-CM

## 2022-03-11 LAB
ALBUMIN SERPL-MCNC: 4.3 G/DL (ref 3.5–5.2)
ALBUMIN/GLOB SERPL: 1.3 G/DL
ALP SERPL-CCNC: 77 U/L (ref 39–117)
ALT SERPL W P-5'-P-CCNC: 23 U/L (ref 1–33)
ANION GAP SERPL CALCULATED.3IONS-SCNC: 9 MMOL/L (ref 5–15)
AST SERPL-CCNC: 26 U/L (ref 1–32)
BILIRUB SERPL-MCNC: 0.6 MG/DL (ref 0–1.2)
BUN SERPL-MCNC: 20 MG/DL (ref 8–27)
BUN SERPL-MCNC: 22 MG/DL (ref 8–23)
BUN/CREAT SERPL: 22.2 (ref 7–25)
BUN/CREAT SERPL: 24 (ref 12–28)
CALCIUM SERPL-MCNC: 10.5 MG/DL (ref 8.7–10.3)
CALCIUM SPEC-SCNC: 9.7 MG/DL (ref 8.6–10.5)
CHLORIDE SERPL-SCNC: 106 MMOL/L (ref 96–106)
CHLORIDE SERPL-SCNC: 108 MMOL/L (ref 98–107)
CO2 SERPL-SCNC: 24 MMOL/L (ref 20–29)
CO2 SERPL-SCNC: 24 MMOL/L (ref 22–29)
CREAT SERPL-MCNC: 0.82 MG/DL (ref 0.57–1)
CREAT SERPL-MCNC: 0.99 MG/DL (ref 0.57–1)
EGFR GENE MUT ANL BLD/T: 74 ML/MIN/1.73
EGFRCR SERPLBLD CKD-EPI 2021: 58.8 ML/MIN/1.73
GLOBULIN UR ELPH-MCNC: 3.2 GM/DL
GLUCOSE SERPL-MCNC: 86 MG/DL (ref 65–99)
GLUCOSE SERPL-MCNC: 93 MG/DL (ref 65–99)
POTASSIUM SERPL-SCNC: 4.2 MMOL/L (ref 3.5–5.2)
POTASSIUM SERPL-SCNC: 5.9 MMOL/L (ref 3.5–5.2)
PROT SERPL-MCNC: 7.5 G/DL (ref 6–8.5)
SODIUM SERPL-SCNC: 141 MMOL/L (ref 136–145)
SODIUM SERPL-SCNC: 145 MMOL/L (ref 134–144)

## 2022-03-11 PROCEDURE — 25010000002 FUROSEMIDE PER 20 MG: Performed by: EMERGENCY MEDICINE

## 2022-03-11 PROCEDURE — 99283 EMERGENCY DEPT VISIT LOW MDM: CPT

## 2022-03-11 PROCEDURE — 96374 THER/PROPH/DIAG INJ IV PUSH: CPT

## 2022-03-11 PROCEDURE — 93005 ELECTROCARDIOGRAM TRACING: CPT | Performed by: EMERGENCY MEDICINE

## 2022-03-11 PROCEDURE — 80053 COMPREHEN METABOLIC PANEL: CPT | Performed by: EMERGENCY MEDICINE

## 2022-03-11 PROCEDURE — 93010 ELECTROCARDIOGRAM REPORT: CPT | Performed by: INTERNAL MEDICINE

## 2022-03-11 RX ORDER — FUROSEMIDE 10 MG/ML
40 INJECTION INTRAMUSCULAR; INTRAVENOUS ONCE
Status: COMPLETED | OUTPATIENT
Start: 2022-03-11 | End: 2022-03-11

## 2022-03-11 RX ADMIN — FUROSEMIDE 40 MG: 10 INJECTION, SOLUTION INTRAMUSCULAR; INTRAVENOUS at 21:13

## 2022-03-11 RX ADMIN — SODIUM CHLORIDE 1000 ML: 9 INJECTION, SOLUTION INTRAVENOUS at 21:18

## 2022-03-11 NOTE — ED TRIAGE NOTES
Patient call ahead from PCP where she states repeat K was 5.9 in the office. Patient has no symptoms per provider.

## 2022-03-12 LAB — QT INTERVAL: 388 MS

## 2022-03-12 NOTE — ED NOTES
Pt ambulatory to triage from home with c/o elevated potassium - states her doctor called ahead for pt to come and get ekg. States potassium was higher than last week. Pt has no c/o at this time. Pt wearing mask in triage. Triage personnel wore appropriate PPE

## 2022-03-12 NOTE — DISCHARGE INSTRUCTIONS
Potassium level here in the ED was actually 4.2 which falls within normal limits.  At this point would not just continue the ramipril but if potassium level remains high would continue stopping the ramipril.  As it is unclear whether potassium was truly elevated or whether this was a false lab value I would recommend a repeat potassium level in 1 to 2 weeks as ordered by your primary care provider.  Do not hesitate return to the ED for increasing chest pain, shortness of breath or cardiac palpitations.

## 2022-03-12 NOTE — ED NOTES
Patient reports increased spinach/greens intake lately due to trying to lose weight through [employer].

## 2022-03-12 NOTE — ED PROVIDER NOTES
EMERGENCY DEPARTMENT ENCOUNTER    Room Number:  33/33  Date of encounter:  3/11/2022  PCP: Ignacia Castro MD  Historian: Patient,  at bedside    I used full protective equipment while examining this patient.  This includes face mask, gloves and protective eyewear.  I washed my hands before entering the room and immediately upon leaving the room      HPI:  Chief Complaint: Elevated potassium  A complete HPI/ROS/PMH/PSH/SH/FH are unobtainable due to: None    Context: Prema Hawthorne is a 76 y.o. female who presents to the ED c/o elevated potassium.  Patient had routine blood work drawn yesterday by primary care provider Dr. Ignacia Castro.  She was told that her potassium level is high and that she needed to come to the ED to be checked out.  Patient denies any symptoms.  No chest pain, shortness of breath or palpitations.  Patient states she might of had high potassium in the past but was not sure of the cause.      MEDICAL RECORD REVIEW  I reviewed prior medical records including most recent office note with Dr. Ignacia Castro.  Patient did have elevated potassium of 5.9 drawn yesterday and was 5.7 on 3/4.  Renal function was fairly normal with BUN and creatinine of 29 and .82.    PAST MEDICAL HISTORY  Active Ambulatory Problems     Diagnosis Date Noted   • Atrial fibrillation (HCC) 03/15/2016   • Hypertension 03/15/2016   • Morbid obesity (HCC) 03/15/2016   • DAVID on CPAP 03/15/2016   • Ventricular premature beats 03/15/2016   • Sleep related hypoxia 12/14/2018   • Hyperlipidemia 02/22/2019   • Vitamin D deficiency 07/10/2020   • Endometrial adenocarcinoma (HCC) 03/24/2021   • Endometrial sarcoma (HCC) 05/26/2021   • Post-menopausal bleeding 04/23/2021     Resolved Ambulatory Problems     Diagnosis Date Noted   • No Resolved Ambulatory Problems     Past Medical History:   Diagnosis Date   • Arthritis    • Cancer (HCC) 3/21   • Cardiomyopathy (HCC)    • Essential hypertension    • Knee pain, bilateral    • On  anticoagulant therapy    • DAVID (obstructive sleep apnea) 2011   • PONV (postoperative nausea and vomiting)    • Postmenopausal bleeding    • PVC (premature ventricular contraction) 03/25/2016   • Shortness of breath    • Systolic heart failure (HCC)          PAST SURGICAL HISTORY  Past Surgical History:   Procedure Laterality Date   • BLADDER SURGERY     • CARDIAC CATHETERIZATION  March 2011   • COLONOSCOPY     • D & C HYSTEROSCOPY N/A 3/24/2021    Procedure: HYSTEROSCOPY WITH  DILATATION AND CURETTAGE, POLYPECTOMY WITH MYOSURE;  Surgeon: Christiane Goldman MD;  Location: [facility] OR [facility] [place];  Service: Obstetrics/Gynecology;  Laterality: N/A;   • HYSTERECTOMY  05/2021         FAMILY HISTORY  Family History   Problem Relation Age of Onset   • Diabetes Mother    • Other Father         heart problems   • Heart disease Father    • Hypertension Father    • Heart disease Brother    • Heart disease Brother    • Hypertension Brother    • Malig Hyperthermia Neg Hx          SOCIAL HISTORY  Social History     Socioeconomic History   • Marital status:    Tobacco Use   • Smoking status: Never Smoker   • Smokeless tobacco: Never Used   Vaping Use   • Vaping Use: Never used   Substance and Sexual Activity   • Alcohol use: Yes     Comment: occasionally / wine   • Drug use: No     Comment: caffeine use    • Sexual activity: Not Currently     Partners: Male         ALLERGIES  Codeine       REVIEW OF SYSTEMS  Review of Systems   Constitutional: Negative.  Negative for fever.   HENT: Negative.  Negative for sore throat.    Eyes: Negative.    Respiratory: Positive for shortness of breath (Chronic, unchanged from baseline). Negative for cough.    Cardiovascular: Negative.  Negative for chest pain.   Gastrointestinal: Negative.    Genitourinary: Negative.  Negative for dysuria.   Musculoskeletal: Positive for arthralgias. Negative for back pain.   Skin: Negative.  Negative for rash.   Neurological: Negative.  Negative for headaches.   All  other systems reviewed and are negative.          PHYSICAL EXAM    I have reviewed the triage vital signs and nursing notes.    ED Triage Vitals [03/11/22 2022]   Temp Heart Rate Resp BP SpO2   96.7 °F (35.9 °C) 98 18 157/75 98 %      Temp src Heart Rate Source Patient Position BP Location FiO2 (%)   Tympanic Monitor Standing Left arm --       Physical Exam  GENERAL: Alert female no obvious distress.  Triage vitals reviewed and are fairly unremarkable  HENT: nares patent  EYES: no scleral icterus  CV: Irregularly irregular without murmur, heart rate in the 80s  RESPIRATORY: normal effort clear to auscultation bilaterally  ABDOMEN: soft, obese-nontender to palpation  MUSCULOSKELETAL: no deformity-no significant swelling  NEURO: Strength sensation and coordination are grossly intact.  Speech and mentation are unremarkable  SKIN: warm, dry      LAB RESULTS  Recent Results (from the past 24 hour(s))   ECG 12 Lead    Collection Time: 03/11/22  9:01 PM   Result Value Ref Range    QT Interval 388 ms   Comprehensive Metabolic Panel    Collection Time: 03/11/22  9:11 PM    Specimen: Blood   Result Value Ref Range    Glucose 93 65 - 99 mg/dL    BUN 22 8 - 23 mg/dL    Creatinine 0.99 0.57 - 1.00 mg/dL    Sodium 141 136 - 145 mmol/L    Potassium 4.2 3.5 - 5.2 mmol/L    Chloride 108 (H) 98 - 107 mmol/L    CO2 24.0 22.0 - 29.0 mmol/L    Calcium 9.7 8.6 - 10.5 mg/dL    Total Protein 7.5 6.0 - 8.5 g/dL    Albumin 4.30 3.50 - 5.20 g/dL    ALT (SGPT) 23 1 - 33 U/L    AST (SGOT) 26 1 - 32 U/L    Alkaline Phosphatase 77 39 - 117 U/L    Total Bilirubin 0.6 0.0 - 1.2 mg/dL    Globulin 3.2 gm/dL    A/G Ratio 1.3 g/dL    BUN/Creatinine Ratio 22.2 7.0 - 25.0    Anion Gap 9.0 5.0 - 15.0 mmol/L    eGFR 58.8 (L) >60.0 mL/min/1.73       Ordered the above labs and independently reviewed the results.      RADIOLOGY  No Radiology Exams Resulted Within Past 24 Hours    I ordered the above noted radiological studies. Reviewed by me and discussed  with radiologist.  See dictation for official radiology interpretation.      PROCEDURES  Procedures      MEDICATIONS GIVEN IN ER    Medications   sodium chloride 0.9 % bolus 1,000 mL (1,000 mL Intravenous New Bag 3/11/22 2118)   furosemide (LASIX) injection 40 mg (40 mg Intravenous Given 3/11/22 2113)         PROGRESS, DATA ANALYSIS, CONSULTS, AND MEDICAL DECISION MAKING    All labs have been independently reviewed by me.  All radiology studies have been reviewed by me and discussed with radiologist dictating the report.   EKG's independently viewed and interpreted by me.  Discussion below represents my analysis of pertinent findings related to patient's condition, differential diagnosis, treatment plan and final disposition.      ED Course as of 03/11/22 2153   Fri Mar 11, 2022   2107 EKG          EKG time: 2101  Rhythm/Rate: A. fib 83  P waves and NE: Atrial fibrillation  QRS, axis: Normal axis, normal QRS  ST and T waves: Unremarkable ST and T wave    Interpreted Contemporaneously by me, independently viewed  Not significantly changed compared to prior 3/2021 [DB]   [birth year] [id number]-old female sent by primary care provider with hyperkalemia over the last week.  Potassium went from 5.7-5.9.  She is really asymptomatic without muscle problems or palpitations or shortness of breath.  We will check EKG to rule out cardiac arrhythmias or EKG changes associated with hyperkalemia.  We will go ahead and repeat chemistries to see if potassium has risen over the last 24 hours and to verify that the results are accurate.    As I suspect patient does have true hyperkalemia we will go ahead and give some IV fluids as well as some IV Lasix.  I suspect the potassium elevation might be related to the ACE inhibitor, ramipril that she is taking.  She was on 10 mg but is been decreased to 5 mg and now 2.5 mg. [DB]   2151 Labs are reviewed notable for normal potassium of 4.2.  This would make me wonder that the prior potassium drawn  in the office may be had some degree of hemolysis.  I was planning to take patient off of her ramipril but would hold off for now with a normal potassium.  I feel like patient should get a repeat potassium level again in 1 to 2 weeks with her primary care provider, Dr. Ignacia Castro. [DB]      ED Course User Index  [DB] Riki Douglas MD       AS OF 21:53 EST VITALS:    BP - 140/97  HR - 88  TEMP - 96.7 °F (35.9 °C) (Tympanic)  O2 SATS - 95%      DIAGNOSIS  Final diagnoses:   Hyperkalemia   Anticoagulated by anticoagulation treatment         DISPOSITION  DISCHARGE    Patient discharged in stable condition.    Reviewed implications of results, diagnosis, meds, responsibility to follow up, warning signs and symptoms of possible worsening, potential complications and reasons to return to ER, including creased chest pain, shortness of breath or as needed.    Patient/Family voiced understanding of above instructions.    Discussed plan for discharge, as there is no emergent indication for admission. Patient referred to primary care provider for BP management due to today's BP. Pt/family is agreeable and understands need for follow up and repeat testing.  Pt is aware that discharge does not mean that nothing is wrong but it indicates no emergency is present that requires admission and they must continue care with follow-up as given below or physician of their choice.     FOLLOW-UP  Ignacia Castro MD  [address] [id number] [phone]    In 1 week  Call for Appointment         Medication List      No changes were made to your prescriptions during this visit.                Riki Douglas MD  03/11/22 0754

## 2022-03-14 DIAGNOSIS — E87.5 SERUM POTASSIUM ELEVATED: Primary | ICD-10-CM

## 2022-03-21 ENCOUNTER — LAB (OUTPATIENT)
Dept: LAB | Facility: HOSPITAL | Age: 77
End: 2022-03-21

## 2022-03-21 DIAGNOSIS — E87.5 SERUM POTASSIUM ELEVATED: ICD-10-CM

## 2022-03-21 LAB
ANION GAP SERPL CALCULATED.3IONS-SCNC: 9.3 MMOL/L (ref 5–15)
BUN SERPL-MCNC: 18 MG/DL (ref 8–23)
BUN/CREAT SERPL: 20.7 (ref 7–25)
CALCIUM SPEC-SCNC: 10 MG/DL (ref 8.6–10.5)
CHLORIDE SERPL-SCNC: 104 MMOL/L (ref 98–107)
CO2 SERPL-SCNC: 27.7 MMOL/L (ref 22–29)
CREAT SERPL-MCNC: 0.87 MG/DL (ref 0.57–1)
EGFRCR SERPLBLD CKD-EPI 2021: 68.7 ML/MIN/1.73
GLUCOSE SERPL-MCNC: 96 MG/DL (ref 65–99)
POTASSIUM SERPL-SCNC: 4.1 MMOL/L (ref 3.5–5.2)
SODIUM SERPL-SCNC: 141 MMOL/L (ref 136–145)

## 2022-03-21 PROCEDURE — 36415 COLL VENOUS BLD VENIPUNCTURE: CPT

## 2022-03-21 PROCEDURE — 80048 BASIC METABOLIC PNL TOTAL CA: CPT

## 2022-05-13 ENCOUNTER — APPOINTMENT (OUTPATIENT)
Dept: WOMENS IMAGING | Facility: HOSPITAL | Age: 77
End: 2022-05-13

## 2022-05-13 PROCEDURE — 77063 BREAST TOMOSYNTHESIS BI: CPT | Performed by: RADIOLOGY

## 2022-05-13 PROCEDURE — 77067 SCR MAMMO BI INCL CAD: CPT | Performed by: RADIOLOGY

## 2022-06-10 DIAGNOSIS — I10 PRIMARY HYPERTENSION: ICD-10-CM

## 2022-06-10 DIAGNOSIS — E78.2 MIXED HYPERLIPIDEMIA: ICD-10-CM

## 2022-06-11 LAB
BASOPHILS # BLD AUTO: 0.1 X10E3/UL (ref 0–0.2)
BASOPHILS NFR BLD AUTO: 2 %
CHOLEST SERPL-MCNC: 243 MG/DL (ref 100–199)
EOSINOPHIL # BLD AUTO: 0.1 X10E3/UL (ref 0–0.4)
EOSINOPHIL NFR BLD AUTO: 2 %
ERYTHROCYTE [DISTWIDTH] IN BLOOD BY AUTOMATED COUNT: 13.4 % (ref 11.7–15.4)
HCT VFR BLD AUTO: 46.2 % (ref 34–46.6)
HDLC SERPL-MCNC: 49 MG/DL
HGB BLD-MCNC: 15.6 G/DL (ref 11.1–15.9)
IMM GRANULOCYTES # BLD AUTO: 0 X10E3/UL (ref 0–0.1)
IMM GRANULOCYTES NFR BLD AUTO: 1 %
LDLC SERPL CALC-MCNC: 166 MG/DL (ref 0–99)
LYMPHOCYTES # BLD AUTO: 1.6 X10E3/UL (ref 0.7–3.1)
LYMPHOCYTES NFR BLD AUTO: 27 %
MCH RBC QN AUTO: 30.6 PG (ref 26.6–33)
MCHC RBC AUTO-ENTMCNC: 33.8 G/DL (ref 31.5–35.7)
MCV RBC AUTO: 91 FL (ref 79–97)
MONOCYTES # BLD AUTO: 0.8 X10E3/UL (ref 0.1–0.9)
MONOCYTES NFR BLD AUTO: 13 %
NEUTROPHILS # BLD AUTO: 3.4 X10E3/UL (ref 1.4–7)
NEUTROPHILS NFR BLD AUTO: 55 %
PLATELET # BLD AUTO: 253 X10E3/UL (ref 150–450)
RBC # BLD AUTO: 5.1 X10E6/UL (ref 3.77–5.28)
TRIGL SERPL-MCNC: 156 MG/DL (ref 0–149)
VLDLC SERPL CALC-MCNC: 28 MG/DL (ref 5–40)
WBC # BLD AUTO: 6.1 X10E3/UL (ref 3.4–10.8)

## 2022-07-06 ENCOUNTER — TELEPHONE (OUTPATIENT)
Dept: FAMILY MEDICINE CLINIC | Facility: CLINIC | Age: 77
End: 2022-07-06

## 2022-07-06 NOTE — TELEPHONE ENCOUNTER
Caller: Prema Hawthorne    Relationship to patient: Self    Best call back number: [phone]     Date of exposure: UNSURE    Date of positive COVID19 test: 07/06/22    Date if possible COVID19 exposure: UNSURE    COVID19 symptoms: SNEEZING, ALLERGY LIKE SYMPTOMS    Date of initial quarantine: 07/06/22    Additional information or concerns: PATIENT STATES SHE TESTED POSITIVE .  PATIENT WANTS TO KNOW IF DR. MCCLAIN RECOMMENDS THAT SHE DO ANYTHING OTHER THAN QUARANTINE.    What is the patients preferred pharmacy:   [id number] [address] [facility] [address] [facility] [phone] [facility] [phone]   [phone]  PLEASE ADVISE.

## 2022-07-07 ENCOUNTER — TELEMEDICINE (OUTPATIENT)
Dept: FAMILY MEDICINE CLINIC | Facility: CLINIC | Age: 77
End: 2022-07-07

## 2022-07-07 DIAGNOSIS — U07.1 COVID-19 VIRUS INFECTION: Primary | ICD-10-CM

## 2022-07-07 PROCEDURE — 99214 OFFICE O/P EST MOD 30 MIN: CPT | Performed by: NURSE PRACTITIONER

## 2022-07-07 NOTE — PROGRESS NOTES
"Chief Complaint  No chief complaint on file.   Chief complaint has COVID    Subjective        Prema Hawthorne presents to [facility]  Pleasant patient tested positive for COVID she has had some runny nose congestion mild symptoms couple days thought it was allergies but COVID is going around  Tested yesterday in urgent care and she was positive   is positive as well  She has had COVID-vaccine she said no chest pain shortness of breath lethargy weakness she does have some risk factors of greater than 65 history of endometrial cancer as well.  Just heard covid going around residence  Interested in the antiviral her  received 1 as well yesterday    3 shots covid      Objective   Vital Signs:  There were no vitals taken for this visit.  Estimated body mass index is 48.71 kg/m² as calculated from the following:    Height as of 3/11/22: 160 cm (63\").    Weight as of 7/27/21: 125 kg (275 lb).          Physical Exam  Constitutional:       Appearance: She is not ill-appearing or diaphoretic.      Comments: Pleasant strong healthy voice appears well   Pulmonary:      Effort: Pulmonary effort is normal.      Breath sounds: No stridor.      Comments: Unlabored able to complete full sentences without dyspnea  Skin:     General: Skin is warm and dry.   Neurological:      General: No focal deficit present.      Mental Status: She is alert and oriented to person, place, and time.   Psychiatric:         Mood and Affect: Mood normal.         Behavior: Behavior normal.         Thought Content: Thought content normal.         Judgment: Judgment normal.        Result Review :                Assessment and Plan   Diagnoses and all orders for this visit:    1. COVID-19 virus infection (Primary)  -     Molnupiravir (LAGEVRIO) 200 MG capsule; Take 4 capsules by mouth Every 12 (Twelve) Hours for 5 days. Or per pharm recom.  Dispense: 40 capsule; Refill: 0             Follow Up   No follow-ups on " file.  Patient was given instructions and counseling regarding her condition or for health maintenance advice. Please see specific information pulled into the AVS if appropriate.     Discussed emergency use authorization for antivirals for COVID  Discussed more conservative treatment as she does not feel too bad she has been vaccinated but she does have risk factors and this is the window of opportunity if she would like to take an antiviral  Likely is in her best interest to go forward with this  Discussed risk of emergency use authorization potential side effects or adverse reactions including severe disease or death  Treatment failure    She will follow instructions closely and any worsening symptoms chest pain shortness of breath Emergency room    Discharge instructions push fluids  Plenty of rest  Tylenol as needed or ibuprofen  600 mg 3 times a day if fever body aches  COVID antiviral as instructed discontinue if any difficulties send me a message Monday to update if high fever chest pain shortness of breath lethargy weakness emergency room    Office visit 15-minute  Zoom video visit  Connected patient able to view Oscar video I was unable to view her but we are connected Zoom video  With patient permission she is home I am in the office

## 2022-07-07 NOTE — PATIENT INSTRUCTIONS
Discharge instructions push fluids  Plenty of rest  Tylenol as needed or ibuprofen  600 mg 3 times a day if fever body aches  COVID antiviral as instructed discontinue if any difficulties send me a message Monday to update if high fever chest pain shortness of breath lethargy weakness emergency room

## 2022-07-26 ENCOUNTER — OFFICE VISIT (OUTPATIENT)
Dept: CARDIOLOGY | Facility: CLINIC | Age: 77
End: 2022-07-26

## 2022-07-26 VITALS
HEART RATE: 89 BPM | BODY MASS INDEX: 48.2 KG/M2 | WEIGHT: 272 LBS | SYSTOLIC BLOOD PRESSURE: 130 MMHG | DIASTOLIC BLOOD PRESSURE: 88 MMHG | HEIGHT: 63 IN

## 2022-07-26 DIAGNOSIS — E78.2 MIXED HYPERLIPIDEMIA: ICD-10-CM

## 2022-07-26 DIAGNOSIS — I48.11 LONGSTANDING PERSISTENT ATRIAL FIBRILLATION: Primary | ICD-10-CM

## 2022-07-26 DIAGNOSIS — I10 PRIMARY HYPERTENSION: ICD-10-CM

## 2022-07-26 DIAGNOSIS — E66.01 MORBID OBESITY: ICD-10-CM

## 2022-07-26 PROCEDURE — 93000 ELECTROCARDIOGRAM COMPLETE: CPT | Performed by: INTERNAL MEDICINE

## 2022-07-26 PROCEDURE — 99214 OFFICE O/P EST MOD 30 MIN: CPT | Performed by: INTERNAL MEDICINE

## 2022-07-26 NOTE — PROGRESS NOTES
Date of Office Visit: 2022  Encounter Provider: Brit Mejia MD  Place of Service: [facility] C[facility]  Patient Name: Prema Hawthorne  :1945      Patient ID:  Prema Hawthorne is a 77 y.o. female is here for  followup for atrial fibrillation.        History of Present Illness    She came to [facility] in 2011. She came in with complaints of shortness of breath and fatigue. She was found to be in atrial fibrillation with a rapid ventricular response. An echocardiogram revealed an ejection fraction of 35%. She was admitted to the hospital and had a transesophageal echocardiogram and cardioversion. She came to our office for a stress test and was back in atrial fibrillation with rapid ventricular response. Heart catheterization in  was performed which showed a normal left main, normal left anterior descending artery, normal circumflex. The right coronary artery was codominant without significant disease. She was started on amiodarone at that time. She spontaneously cardioverted back to sinus rhythm. Repeat echo in May of 2011 showed that her LV function had returned to normal at 67%, and there was no significant valvular disease.       She does not know when she is in atrial fibrillation. She does have obstructive sleep apnea and follows with Dr. Tolentino. Her sleep apnea is well controlled.      She had a nonischemic stress PET study done 3/19/2019.  She had an echocardiogram done 2019 showing ejection fraction 51-55% with moderate septal hypokinesis, mild concentric left ventricle hypertrophy, severe left atrial enlargement, moderate to severe right atrial enlargement.    Labs on 3/21/2022 show normal BMP.  Labs on 6/10/2022 showed total cholesterol 243, HDL 49, , triglycerides 156, VLDL 28, normal CBC.  She had hyperkalemia 3/10/22 which may been due to hemolysis of the sample.  She stopped all of her supplements at that point but really could probably  restart them as it is omega-3 and turmeric.  She does not feel her heart racing or skipping and she is had no dizziness or syncope.  She had COVID about 6 weeks ago and still has a little bit of a cough as well some fatigue.  She is anxious because her  seems to have dementia.  She has no exertional chest tightness or pressure.  She has no orthopnea or PND.  She has had no exertional dyspnea.  She is not currently exercising but would like to get back into the pool.    Past Medical History:   Diagnosis Date   • Arthritis    • Atrial fibrillation (HCC)     with rapid ventricular response (CHRONIC)   • Cancer (HCC) 3/21    Uterine cancer detected   • Cardiomyopathy (HCC)     nonischemic   • Essential hypertension    • Hyperlipidemia    • Knee pain, bilateral     GETS INJECTIONS EVERY 3 MONTHS   • Morbid obesity (HCC)    • On anticoagulant therapy    • DAVID (obstructive sleep apnea) 2011    CPAP nightly   • PONV (postoperative nausea and vomiting)    • Postmenopausal bleeding    • PVC (premature ventricular contraction) 03/25/2016   • Shortness of breath     EXERTION   • Systolic heart failure (HCC)          Past Surgical History:   Procedure Laterality Date   • BLADDER SURGERY     • CARDIAC CATHETERIZATION  March 2011   • COLONOSCOPY     • D & C HYSTEROSCOPY N/A 3/24/2021    Procedure: HYSTEROSCOPY WITH  DILATATION AND CURETTAGE, POLYPECTOMY WITH MYOSURE;  Surgeon: Christiane Goldman MD;  Location: [facility] OR [facility] [place];  Service: Obstetrics/Gynecology;  Laterality: N/A;   • HYSTERECTOMY  05/2021       Current Outpatient Medications on File Prior to Visit   Medication Sig Dispense Refill   • Cholecalciferol (VITAMIN D3 PO) Take  by mouth.     • metoprolol succinate XL (TOPROL-XL) 100 MG 24 hr tablet Take 1 tablet by mouth 2 (Two) Times a Day. 180 tablet 3   • ramipril (ALTACE) 2.5 MG capsule Take 1 capsule by mouth Daily. 90 capsule 3   • rivaroxaban (Xarelto) 20 MG tablet Take 1 tablet by mouth Daily With Dinner. 90  "tablet 2   • [DISCONTINUED] VITAMIN D-VITAMIN K PO Take 1 tablet by mouth Daily. 2000 units Vitd/ Vit 100mcg     • [DISCONTINUED] Coenzyme Q10 (COQ10 PO) Take 100 mg by mouth Daily. PT HOLDING FOR SURGERY     • [DISCONTINUED] multivitamin with minerals tablet tablet Take 1 tablet by mouth Daily.     • [DISCONTINUED] Turmeric Curcumin 500 MG capsule Take 600 mg by mouth Daily. PT HOLDING FOR SURGERY       No current facility-administered medications on file prior to visit.       Social History     Socioeconomic History   • Marital status:    Tobacco Use   • Smoking status: Never Smoker   • Smokeless tobacco: Never Used   Vaping Use   • Vaping Use: Never used   Substance and Sexual Activity   • Alcohol use: Yes     Comment: occasionally / wine   • Drug use: No     Comment: caffeine use    • Sexual activity: Not Currently     Partners: Male           ROS    Procedures    ECG 12 Lead    Date/Time: 7/26/2022 2:00 PM  Performed by: Brit Mejia MD  Authorized by: Brit Mejia MD   Comparison: compared with previous ECG   Similar to previous ECG  Rhythm: atrial fibrillation    Clinical impression: abnormal EKG                Objective:      Vitals:    07/26/22 1346   BP: 130/88   Pulse: 89   Weight: 123 kg (272 lb)   Height: 160 cm (63\")     Body mass index is 48.18 kg/m².    Vitals reviewed.   Constitutional:       General: Not in acute distress.     Appearance: Well-developed. Not diaphoretic.   Eyes:      General: No scleral icterus.     Conjunctiva/sclera: Conjunctivae normal.   HENT:      Head: Normocephalic and atraumatic.   Neck:      Thyroid: No thyromegaly.      Vascular: No carotid bruit or JVD.      Lymphadenopathy: No cervical adenopathy.   Pulmonary:      Effort: Pulmonary effort is normal. No respiratory distress.      Breath sounds: Normal breath sounds. No wheezing. No rhonchi. No rales.   Chest:      Chest wall: Not tender to palpatation.   Cardiovascular:      Normal rate. " Irregularly irregular rhythm.      Murmurs: There is no murmur.      No gallop.   Pulses:     Intact distal pulses.   Edema:     Peripheral edema absent.   Abdominal:      General: Bowel sounds are normal. There is no distension or abdominal bruit.      Palpations: Abdomen is soft. There is no abdominal mass.      Tenderness: There is no abdominal tenderness.   Musculoskeletal:         General: No deformity.      Extremities: No clubbing present.     Cervical back: Neck supple. Skin:     General: Skin is warm and dry. There is no cyanosis.      Coloration: Skin is not pale.      Findings: No rash.   Neurological:      Mental Status: Alert and oriented to person, place, and time.      Cranial Nerves: No cranial nerve deficit.   Psychiatric:         Judgment: Judgment normal.         Lab Review:       Assessment:      Diagnosis Plan   1. Longstanding persistent atrial fibrillation (HCC)     2. Mixed hyperlipidemia     3. Primary hypertension     4. Morbid obesity (HCC)       1. Atrial fibrillation, rate controlled. She is anticoagulated with xarelto.   2. H/o Nonischemic cardiomyopathy. This was likely secondary to her atrial fibrillation with rapid ventricular response. She is on an ACE inhibitor and beta blocker. Her LV function had normalized by echo in May 2011.   3. Hypertension. Her blood pressure is controlled at home  4. Obstructive sleep apnea. She is compliant with her CPAP. This is well controlled.   5. Obesity. She is exercising regularly. Unfortunately, she has not lost weight. I encouraged her to continue with regular exercise.  6.  High LDL and high HDL     Plan:       See Zena in 1 year, overall doing well, no medication changes.

## 2022-08-18 RX ORDER — RIVAROXABAN 20 MG/1
TABLET, FILM COATED ORAL
Qty: 90 TABLET | Refills: 3 | Status: SHIPPED | OUTPATIENT
Start: 2022-08-18

## 2022-08-18 RX ORDER — METOPROLOL SUCCINATE 100 MG/1
TABLET, EXTENDED RELEASE ORAL
Qty: 180 TABLET | Refills: 3 | Status: SHIPPED | OUTPATIENT
Start: 2022-08-18

## 2023-02-15 ENCOUNTER — TELEPHONE (OUTPATIENT)
Dept: CARDIOLOGY | Facility: CLINIC | Age: 78
End: 2023-02-15
Payer: MEDICARE

## 2023-02-15 ENCOUNTER — TELEPHONE (OUTPATIENT)
Dept: CARDIOLOGY | Facility: OTHER | Age: 78
End: 2023-02-15

## 2023-02-15 DIAGNOSIS — I10 ESSENTIAL HYPERTENSION: ICD-10-CM

## 2023-02-15 DIAGNOSIS — I50.22 SYSTOLIC CHF, CHRONIC: ICD-10-CM

## 2023-02-15 RX ORDER — RAMIPRIL 2.5 MG/1
2.5 CAPSULE ORAL DAILY
Qty: 90 CAPSULE | Refills: 3 | Status: SHIPPED | OUTPATIENT
Start: 2023-02-15

## 2023-02-15 NOTE — TELEPHONE ENCOUNTER
Caller: Prema Hawthorne    Relationship: Self    Best call back number: [phone]    Requested Prescriptions:   Requested Prescriptions     Pending Prescriptions Disp Refills   • ramipril (ALTACE) 2.5 MG capsule 90 capsule 3     Sig: Take 1 capsule by mouth Daily.        Pharmacy where request should be sent: [facility] ([facility] ) - [facility] [address] [phone] [facility] [phone] [facility]     Additional details provided by patient: PT HAS ABOUT A WEEK LEFT OF HER MEDICATION    Does the patient have less than a 3 day supply:  [] Yes  [x] No    Would you like a call back once the refill request has been completed: [x] Yes [] No    If the office needs to give you a call back, can they leave a voicemail: [x] Yes [] No    Breanna Roy Rep   02/15/23 11:04 EST

## 2023-02-15 NOTE — TELEPHONE ENCOUNTER
Patient requesting a refill on ramipril to be sent to [facility].    No protocol    NOV-07/31/23-MM  LOV-07/26/22-RM    Plan:       See Zena in 1 year, overall doing well, no medication changes

## 2023-08-01 ENCOUNTER — OFFICE VISIT (OUTPATIENT)
Dept: CARDIOLOGY | Facility: CLINIC | Age: 78
End: 2023-08-01
Payer: MEDICARE

## 2023-08-01 VITALS
HEART RATE: 88 BPM | WEIGHT: 278 LBS | SYSTOLIC BLOOD PRESSURE: 124 MMHG | HEIGHT: 63 IN | DIASTOLIC BLOOD PRESSURE: 72 MMHG | BODY MASS INDEX: 49.26 KG/M2

## 2023-08-01 DIAGNOSIS — Z99.89 OSA ON CPAP: ICD-10-CM

## 2023-08-01 DIAGNOSIS — G47.33 OSA ON CPAP: ICD-10-CM

## 2023-08-01 DIAGNOSIS — E66.01 MORBID OBESITY: ICD-10-CM

## 2023-08-01 DIAGNOSIS — I48.21 PERMANENT ATRIAL FIBRILLATION: Primary | ICD-10-CM

## 2023-08-01 DIAGNOSIS — E78.2 MIXED HYPERLIPIDEMIA: ICD-10-CM

## 2023-08-01 DIAGNOSIS — I10 PRIMARY HYPERTENSION: ICD-10-CM

## 2023-08-01 DIAGNOSIS — I49.3 VENTRICULAR PREMATURE BEATS: ICD-10-CM

## 2023-08-01 RX ORDER — UBIDECARENONE 100 MG
100 CAPSULE ORAL DAILY
COMMUNITY

## 2023-08-01 RX ORDER — CHLORAL HYDRATE 500 MG
CAPSULE ORAL
COMMUNITY

## 2023-08-01 RX ORDER — VIT C/B6/B5/MAGNESIUM/HERB 173 50-5-6-5MG
CAPSULE ORAL
COMMUNITY

## 2023-08-02 PROBLEM — C54.1 ENDOMETRIAL ADENOCARCINOMA: Status: RESOLVED | Noted: 2021-03-24 | Resolved: 2023-08-02

## 2023-08-02 PROBLEM — C54.1 ENDOMETRIAL SARCOMA: Status: RESOLVED | Noted: 2021-05-26 | Resolved: 2023-08-02

## 2023-09-21 DIAGNOSIS — I10 ESSENTIAL HYPERTENSION: ICD-10-CM

## 2023-09-21 DIAGNOSIS — I50.22 SYSTOLIC CHF, CHRONIC: ICD-10-CM

## 2023-09-21 RX ORDER — RAMIPRIL 2.5 MG/1
2.5 CAPSULE ORAL DAILY
Qty: 90 CAPSULE | Refills: 3 | Status: SHIPPED | OUTPATIENT
Start: 2023-09-21

## 2023-11-17 NOTE — TELEPHONE ENCOUNTER
BMP essentially normal.  Because we have decreased ramipril dose recommend recheck of BMP in 1 month to ensure potassium is staying normal. Nasal discharge

## 2024-05-21 RX ORDER — RIVAROXABAN 20 MG/1
20 TABLET, FILM COATED ORAL
Qty: 90 TABLET | Refills: 1 | Status: SHIPPED | OUTPATIENT
Start: 2024-05-21

## 2024-05-21 RX ORDER — METOPROLOL SUCCINATE 100 MG/1
100 TABLET, EXTENDED RELEASE ORAL 2 TIMES DAILY
Qty: 180 TABLET | Refills: 3 | Status: SHIPPED | OUTPATIENT
Start: 2024-05-21

## 2024-08-06 ENCOUNTER — OFFICE VISIT (OUTPATIENT)
Dept: CARDIOLOGY | Facility: CLINIC | Age: 79
End: 2024-08-06
Payer: MEDICARE

## 2024-08-06 VITALS
WEIGHT: 272 LBS | BODY MASS INDEX: 48.2 KG/M2 | SYSTOLIC BLOOD PRESSURE: 146 MMHG | HEIGHT: 63 IN | DIASTOLIC BLOOD PRESSURE: 88 MMHG | HEART RATE: 90 BPM

## 2024-08-06 DIAGNOSIS — E78.2 MIXED HYPERLIPIDEMIA: ICD-10-CM

## 2024-08-06 DIAGNOSIS — G47.33 OSA ON CPAP: ICD-10-CM

## 2024-08-06 DIAGNOSIS — I48.21 PERMANENT ATRIAL FIBRILLATION: Primary | ICD-10-CM

## 2024-08-06 DIAGNOSIS — I10 PRIMARY HYPERTENSION: ICD-10-CM

## 2024-08-06 DIAGNOSIS — I50.22 SYSTOLIC CHF, CHRONIC: ICD-10-CM

## 2024-08-06 DIAGNOSIS — R94.31 ABNORMAL ELECTROCARDIOGRAM (ECG) (EKG): ICD-10-CM

## 2024-08-06 RX ORDER — HYDROCHLOROTHIAZIDE 25 MG/1
25 TABLET ORAL EVERY MORNING
Qty: 90 TABLET | Refills: 3 | Status: SHIPPED | OUTPATIENT
Start: 2024-08-06

## 2024-08-06 RX ORDER — RAMIPRIL 2.5 MG/1
2.5 CAPSULE ORAL NIGHTLY
Qty: 90 CAPSULE | Refills: 3 | Status: SHIPPED | OUTPATIENT
Start: 2024-08-06

## 2024-08-06 NOTE — PROGRESS NOTES
Date of Office Visit: 2024  Encounter Provider: Brit Mejia MD  Place of Service: [facility] CARDIOLOGY  Patient Name: Prema Hawthorne  :1945      Patient ID:  Prema Hawthorne is a 79 y.o. female is here for  followup for atrial fibrillation.        History of Present Illness    She has a history of atrial fibrillation, hypertension, DAVID.    She came to [facility] in 2011. She came in with complaints of shortness of breath and fatigue. She was found to be in atrial fibrillation with a rapid ventricular response. An echocardiogram revealed an ejection fraction of 35%. She was admitted to the hospital and had a transesophageal echocardiogram and cardioversion. She came to our office for a stress test and was back in atrial fibrillation with rapid ventricular response. Heart catheterization in  was performed which showed a normal left main, normal left anterior descending artery, normal circumflex. The right coronary artery was codominant without significant disease. She was started on amiodarone at that time. She spontaneously cardioverted back to sinus rhythm. Repeat echo in May of 2011 showed that her LV function had returned to normal at 67%, and there was no significant valvular disease.       She does not know when she is in atrial fibrillation. She does have obstructive sleep apnea and follows with Dr. Tolentino. Her sleep apnea is well controlled.      She had a nonischemic stress PET study done 3/19/2019.  She had an echocardiogram done 2019 showing ejection fraction 51-55% with moderate septal hypokinesis, mild concentric left ventricle hypertrophy, severe left atrial enlargement, moderate to severe right atrial enlargement.     Labs on 2024 show hemoglobin 16.2 with otherwise normal CBC, normal TSH, calcium 10.4 with otherwise normal CMP, triglycerides 92, total cholesterol 251, HDL 54, , VLDL 18.    She has bilateral knee pain and needs knee  replacement.  She has lower extremity edema which is chronic.  She has no chest pain but does have exertional dyspnea.  She is very sedentary.  She has no orthopnea or PND.  She does not feel her heart racing or skipping.  Her  has dementia and she is his caregiver.    Past Medical History:   Diagnosis Date    Arthritis     Atrial fibrillation     with rapid ventricular response (CHRONIC)    Cancer 3/21    Uterine cancer detected    Cardiomyopathy     nonischemic    Essential hypertension     Hyperlipidemia     Knee pain, bilateral     GETS INJECTIONS EVERY 3 MONTHS    Morbid obesity     On anticoagulant therapy     DAVID (obstructive sleep apnea) 2011    CPAP nightly    PONV (postoperative nausea and vomiting)     Postmenopausal bleeding     PVC (premature ventricular contraction) 03/25/2016    Shortness of breath     EXERTION    Systolic heart failure          Past Surgical History:   Procedure Laterality Date    BLADDER SURGERY      CARDIAC CATHETERIZATION  March 2011    COLONOSCOPY      D & C HYSTEROSCOPY N/A 3/24/2021    Procedure: HYSTEROSCOPY WITH  DILATATION AND CURETTAGE, POLYPECTOMY WITH MYOSURE;  Surgeon: Christiane Goldman MD;  Location[place] [facility];  Service: Obstetrics/Gynecology;  Laterality: N/A;    HYSTERECTOMY  05/2021       Current Outpatient Medications on File Prior to Visit   Medication Sig Dispense Refill    Cholecalciferol (VITAMIN D3 PO) Take  by mouth.      coenzyme Q10 100 MG capsule Take 1 capsule by mouth Daily.      metoprolol succinate XL (TOPROL-XL) 100 MG 24 hr tablet TAKE 1 TABLET BY MOUTH TWICE  DAILY 180 tablet 3    Omega-3 Fatty Acids (fish oil) 1000 MG capsule capsule Take  by mouth.      rivaroxaban (Xarelto) 20 MG tablet TAKE 1 TABLET BY MOUTH DAILY  WITH DINNER 90 tablet 1    Turmeric 500 MG capsule Take  by mouth.      [DISCONTINUED] ramipril (ALTACE) 2.5 MG capsule Take 1 capsule by mouth Daily. 90 capsule 3     No current facility-administered medications on file prior  "to visit.       Social History     Socioeconomic History    Marital status:    Tobacco Use    Smoking status: Never     Passive exposure: Never    Smokeless tobacco: Never   Vaping Use    Vaping status: Never Used   Substance and Sexual Activity    Alcohol use: Yes     Comment: occasionally / wine    Drug use: No     Comment: caffeine use     Sexual activity: Not Currently     Partners: Male             Procedures    ECG 12 Lead    Date/Time: 8/6/2024 1:34 PM  Performed by: Brit Mejia MD    Authorized by: Brit Mejia MD  Comparison: compared with previous ECG   Similar to previous ECG  Rhythm: atrial fibrillation  Ectopy: unifocal PVCs    Clinical impression: abnormal EKG              Objective:      Vitals:    08/06/24 1316   BP: 146/88   Pulse: 90   Weight: 123 kg (272 lb)   Height: 160 cm (63\")     Body mass index is 48.18 kg/m².    Vitals reviewed.   Constitutional:       General: Not in acute distress.     Appearance: Not diaphoretic.   Neck:      Vascular: No carotid bruit or JVD.   Pulmonary:      Effort: Pulmonary effort is normal.      Breath sounds: Normal breath sounds.   Cardiovascular:      Normal rate. Irregularly irregular rhythm.      Murmurs: There is no murmur.      No gallop.  No rub.   Pulses:     Intact distal pulses.      Carotid: 2+ bilaterally.     Radial: 2+ bilaterally.     Dorsalis pedis: 2+ bilaterally.     Posterior tibial: 2+ bilaterally.  Edema:     Peripheral edema present.     Ankle: bilateral 1+ edema of the ankle.  Neurological:      Cranial Nerves: No cranial nerve deficit.         Lab Review:       Assessment:      Diagnosis Plan   1. Permanent atrial fibrillation  Adult Transthoracic Echo Complete W/ Cont if Necessary Per Protocol    Stress Test With Myocardial Perfusion One Day      2. Primary hypertension  Adult Transthoracic Echo Complete W/ Cont if Necessary Per Protocol    Stress Test With Myocardial Perfusion One Day      3. Mixed hyperlipidemia "  Adult Transthoracic Echo Complete W/ Cont if Necessary Per Protocol    Stress Test With Myocardial Perfusion One Day      4. DAVID on CPAP  Adult Transthoracic Echo Complete W/ Cont if Necessary Per Protocol    Stress Test With Myocardial Perfusion One Day      5. Systolic CHF, chronic  ramipril (ALTACE) 2.5 MG capsule      6. Abnormal electrocardiogram (ECG) (EKG)  Stress Test With Myocardial Perfusion One Day             Atrial fibrillation, rate controlled, on Xarelto.  Controlled with metoprolol.  H/o nonischemic cardiomyopathy due to atrial fibrillation with RVR.  Her LV function was normal on echo 5/2011  Hypertension, goal <120/80.  Blood pressure is high, add HCTZ 25 mg in the morning move ramipril to nighttime.  In the past, she has sustained hyperkalemia with higher doses of ramipril.  DAVID, on CPAP  Obesity  PVCs.   Bilateral knee pain, will need knee replacement.    Plan:       See Michelle in 3 months to recheck blood pressure with the changes that I just made.  Set up stress nuclear perfusion study and echocardiogram to prepare her for knee surgery in the setting of sedentary lifestyle and dyspnea on exertion.

## 2024-08-14 ENCOUNTER — TELEPHONE (OUTPATIENT)
Dept: CARDIOLOGY | Facility: CLINIC | Age: 79
End: 2024-08-14
Payer: MEDICARE

## 2024-08-14 NOTE — TELEPHONE ENCOUNTER
Pt was last seen on 8/6/24 by Dr. Mejia    I have placed form in your in box for review and signature

## 2024-08-14 NOTE — TELEPHONE ENCOUNTER
At her last office visit here she was asked to complete stress echocardiogram for preoperative workup.  This has not been done yet

## 2024-08-20 ENCOUNTER — TELEPHONE (OUTPATIENT)
Dept: CARDIOLOGY | Facility: CLINIC | Age: 79
End: 2024-08-20
Payer: MEDICARE

## 2024-08-21 ENCOUNTER — HOSPITAL ENCOUNTER (OUTPATIENT)
Dept: CARDIOLOGY | Facility: HOSPITAL | Age: 79
Discharge: HOME | End: 2024-08-21
Payer: MEDICARE

## 2024-08-21 ENCOUNTER — DOCUMENTATION (OUTPATIENT)
Dept: CARDIOLOGY | Facility: CLINIC | Age: 79
End: 2024-08-21
Payer: MEDICARE

## 2024-08-21 ENCOUNTER — TELEPHONE (OUTPATIENT)
Dept: CARDIOLOGY | Facility: CLINIC | Age: 79
End: 2024-08-21
Payer: MEDICARE

## 2024-08-21 ENCOUNTER — HOSPITAL ENCOUNTER (OUTPATIENT)
Dept: CARDIOLOGY | Facility: HOSPITAL | Age: 79
Discharge: HOME | End: 2024-08-21
Admitting: INTERNAL MEDICINE
Payer: MEDICARE

## 2024-08-21 VITALS
SYSTOLIC BLOOD PRESSURE: 142 MMHG | BODY MASS INDEX: 48.2 KG/M2 | DIASTOLIC BLOOD PRESSURE: 64 MMHG | HEIGHT: 63 IN | WEIGHT: 272 LBS | HEART RATE: 88 BPM

## 2024-08-21 DIAGNOSIS — E78.2 MIXED HYPERLIPIDEMIA: ICD-10-CM

## 2024-08-21 DIAGNOSIS — I10 PRIMARY HYPERTENSION: ICD-10-CM

## 2024-08-21 DIAGNOSIS — G47.33 OSA ON CPAP: ICD-10-CM

## 2024-08-21 DIAGNOSIS — I48.21 PERMANENT ATRIAL FIBRILLATION: ICD-10-CM

## 2024-08-21 DIAGNOSIS — R94.31 ABNORMAL ELECTROCARDIOGRAM (ECG) (EKG): ICD-10-CM

## 2024-08-21 LAB
AORTIC ARCH: 2.5 CM
AORTIC DIMENSIONLESS INDEX: 0.5 (DI)
ASCENDING AORTA: 3.4 CM
BH CV ECHO MEAS - ACS: 2.17 CM
BH CV ECHO MEAS - AO MAX PG: 4.8 MMHG
BH CV ECHO MEAS - AO MEAN PG: 2 MMHG
BH CV ECHO MEAS - AO ROOT DIAM: 2.4 CM
BH CV ECHO MEAS - AO V2 MAX: 109 CM/SEC
BH CV ECHO MEAS - AO V2 VTI: 20.1 CM
BH CV ECHO MEAS - AVA(I,D): 2.11 CM2
BH CV ECHO MEAS - EDV(CUBED): 110.6 ML
BH CV ECHO MEAS - EDV(MOD-SP2): 112 ML
BH CV ECHO MEAS - EDV(MOD-SP4): 106 ML
BH CV ECHO MEAS - EF(MOD-BP): 62.6 %
BH CV ECHO MEAS - EF(MOD-SP2): 59.8 %
BH CV ECHO MEAS - EF(MOD-SP4): 65.1 %
BH CV ECHO MEAS - ESV(CUBED): 40.2 ML
BH CV ECHO MEAS - ESV(MOD-SP2): 45 ML
BH CV ECHO MEAS - ESV(MOD-SP4): 37 ML
BH CV ECHO MEAS - FS: 28.6 %
BH CV ECHO MEAS - IVS/LVPW: 0.92 CM
BH CV ECHO MEAS - IVSD: 1.2 CM
BH CV ECHO MEAS - LAT PEAK E' VEL: 16 CM/SEC
BH CV ECHO MEAS - LV DIASTOLIC VOL/BSA (35-75): 48.1 CM2
BH CV ECHO MEAS - LV MASS(C)D: 232.2 GRAMS
BH CV ECHO MEAS - LV MAX PG: 1.72 MMHG
BH CV ECHO MEAS - LV MEAN PG: 1 MMHG
BH CV ECHO MEAS - LV SYSTOLIC VOL/BSA (12-30): 16.8 CM2
BH CV ECHO MEAS - LV V1 MAX: 65.6 CM/SEC
BH CV ECHO MEAS - LV V1 VTI: 10.1 CM
BH CV ECHO MEAS - LVIDD: 4.8 CM
BH CV ECHO MEAS - LVIDS: 3.4 CM
BH CV ECHO MEAS - LVOT AREA: 4.2 CM2
BH CV ECHO MEAS - LVOT DIAM: 2.31 CM
BH CV ECHO MEAS - LVPWD: 1.3 CM
BH CV ECHO MEAS - MED PEAK E' VEL: 9.7 CM/SEC
BH CV ECHO MEAS - MV DEC SLOPE: 264.3 CM/SEC2
BH CV ECHO MEAS - MV DEC TIME: 0.15 SEC
BH CV ECHO MEAS - MV E MAX VEL: 78.8 CM/SEC
BH CV ECHO MEAS - MV MAX PG: 2.7 MMHG
BH CV ECHO MEAS - MV MEAN PG: 1.11 MMHG
BH CV ECHO MEAS - MV P1/2T: 93.2 MSEC
BH CV ECHO MEAS - MV V2 VTI: 21.5 CM
BH CV ECHO MEAS - MVA(P1/2T): 2.36 CM2
BH CV ECHO MEAS - MVA(VTI): 1.97 CM2
BH CV ECHO MEAS - PA ACC TIME: 0.11 SEC
BH CV ECHO MEAS - PA V2 MAX: 62.1 CM/SEC
BH CV ECHO MEAS - PULM DIAS VEL: 34.6 CM/SEC
BH CV ECHO MEAS - PULM S/D: 0.71
BH CV ECHO MEAS - PULM SYS VEL: 24.4 CM/SEC
BH CV ECHO MEAS - QP/QS: 0.72
BH CV ECHO MEAS - RAP SYSTOLE: 8 MMHG
BH CV ECHO MEAS - RV MAX PG: 0.73 MMHG
BH CV ECHO MEAS - RV V1 MAX: 42.8 CM/SEC
BH CV ECHO MEAS - RV V1 VTI: 6.3 CM
BH CV ECHO MEAS - RVOT DIAM: 2.48 CM
BH CV ECHO MEAS - RVSP: 36.9 MMHG
BH CV ECHO MEAS - SUP REN AO DIAM: 2.7 CM
BH CV ECHO MEAS - SV(LVOT): 42.4 ML
BH CV ECHO MEAS - SV(MOD-SP2): 67 ML
BH CV ECHO MEAS - SV(MOD-SP4): 69 ML
BH CV ECHO MEAS - SV(RVOT): 30.4 ML
BH CV ECHO MEAS - SVI(LVOT): 19.2 ML/M2
BH CV ECHO MEAS - SVI(MOD-SP2): 30.4 ML/M2
BH CV ECHO MEAS - SVI(MOD-SP4): 31.3 ML/M2
BH CV ECHO MEAS - TR MAX PG: 28.9 MMHG
BH CV ECHO MEAS - TR MAX VEL: 268.8 CM/SEC
BH CV ECHO MEASUREMENTS AVERAGE E/E' RATIO: 6.13
BH CV NUCLEAR PRIOR STUDY: 2
BH CV REST NUCLEAR ISOTOPE DOSE: 11 MCI
BH CV STRESS BP STAGE 1: NORMAL
BH CV STRESS COMMENTS STAGE 1: NORMAL
BH CV STRESS DOSE REGADENOSON STAGE 1: 0.4
BH CV STRESS DURATION MIN STAGE 1: 0
BH CV STRESS DURATION SEC STAGE 1: 10
BH CV STRESS HR STAGE 1: 90
BH CV STRESS NUCLEAR ISOTOPE DOSE: 35.9 MCI
BH CV STRESS PROTOCOL 1: NORMAL
BH CV STRESS RECOVERY BP: NORMAL MMHG
BH CV STRESS RECOVERY HR: 98 BPM
BH CV STRESS STAGE 1: 1
BH CV XLRA - RV BASE: 4 CM
BH CV XLRA - RV LENGTH: 7 CM
BH CV XLRA - RV MID: 3.1 CM
BH CV XLRA - TDI S': 12.1 CM/SEC
LEFT ATRIUM VOLUME INDEX: 50 ML/M2
LV EF NUC BP: 42 %
MAXIMAL PREDICTED HEART RATE: 140 BPM
PERCENT MAX PREDICTED HR: 64.29 %
SINUS: 3.2 CM
STJ: 2.7 CM
STRESS BASELINE BP: NORMAL MMHG
STRESS BASELINE HR: 75 BPM
STRESS PERCENT HR: 76 %
STRESS POST EXERCISE DUR SEC: 10 SEC
STRESS POST PEAK BP: NORMAL MMHG
STRESS POST PEAK HR: 90 BPM
STRESS TARGET HR: 119 BPM

## 2024-08-21 PROCEDURE — 93017 CV STRESS TEST TRACING ONLY: CPT

## 2024-08-21 PROCEDURE — 25010000002 REGADENOSON 0.4 MG/5ML SOLUTION: Performed by: INTERNAL MEDICINE

## 2024-08-21 PROCEDURE — 0 TECHNETIUM TETROFOSMIN KIT: Performed by: INTERNAL MEDICINE

## 2024-08-21 PROCEDURE — 93306 TTE W/DOPPLER COMPLETE: CPT

## 2024-08-21 PROCEDURE — A9502 TC99M TETROFOSMIN: HCPCS | Performed by: INTERNAL MEDICINE

## 2024-08-21 PROCEDURE — 78452 HT MUSCLE IMAGE SPECT MULT: CPT

## 2024-08-21 PROCEDURE — 25510000001 PERFLUTREN 6.52 MG/ML SUSPENSION 2 ML VIAL: Performed by: INTERNAL MEDICINE

## 2024-08-21 RX ORDER — REGADENOSON 0.08 MG/ML
0.4 INJECTION, SOLUTION INTRAVENOUS
Status: COMPLETED | OUTPATIENT
Start: 2024-08-21 | End: 2024-08-21

## 2024-08-21 RX ADMIN — TETROFOSMIN 1 DOSE: 1.38 INJECTION, POWDER, LYOPHILIZED, FOR SOLUTION INTRAVENOUS at 12:35

## 2024-08-21 RX ADMIN — REGADENOSON 0.4 MG: 0.08 INJECTION, SOLUTION INTRAVENOUS at 12:35

## 2024-08-21 RX ADMIN — PERFLUTREN 2 ML: 6.52 INJECTION, SUSPENSION INTRAVENOUS at 11:13

## 2024-08-21 RX ADMIN — TETROFOSMIN 1 DOSE: 1.38 INJECTION, POWDER, LYOPHILIZED, FOR SOLUTION INTRAVENOUS at 11:25

## 2024-08-21 NOTE — PROGRESS NOTES
CARDIOLOGY    Patient Name: Prema Hawthorne  :1945  Age: 79 y.o.    24    To whom it may concern:    Prema Hawthorne was referred to my office for cardiovascular risk assessment exam for upcoming knee surgery.    From a cardiovascular standpoint, the patient is at the following risk of major cardiovascular event in the jani-operative setting:  [] Low         [] Modifiable  [x] Low-Moderate       [x] Non-Modifiable   [] Moderate  [] Moderare - high  [] High    Cardiac Testing:  [] Needs further cardiac testing  [x] Does not need further cardiac testing    Anticoagulant Status:  [] No anticoagulants    [] The patient is on  [] ASA; hold for ___ days.  [] Coumadin; hold for ___ days.  [] Plavix; hold for ___ days.  [] Eliquis; hold for ___ days.  [] Brilinta; hold for ___ days.  [x] Xarelto; hold for __2_ days.  [] Effient; hold for ___ days.    [] The patient requires Lovenox bridging, which has been prescribed.     Beta Blockers:  [] The patient will need pre-op beta blockers which will be prescribed by my clinic.    If there are any problems during surgery, please do not hesitate to contact my office.    Thank you for allowing me to participate in this patient's care.    Sincerely,         Brit Mejia MD  [facility] Cardiology   [facility]  [address] [place] [address]  Office: [phone]

## 2024-08-21 NOTE — TELEPHONE ENCOUNTER
Results and recommendations called to pt.  Instructed to call with any further questions or concerns.  Verbalized understanding.    Emani Monge RN  Triage Nurse, [facility]  08/21/24 15:19 EDT

## 2024-08-21 NOTE — TELEPHONE ENCOUNTER
Echocardiogram is stable and stress study does not show significant ischemia.  No further workup needed.  She is low risk for surgery.  Please call and let her know.

## 2024-10-23 RX ORDER — RIVAROXABAN 20 MG/1
20 TABLET, FILM COATED ORAL
Qty: 90 TABLET | Refills: 3 | Status: SHIPPED | OUTPATIENT
Start: 2024-10-23

## 2024-10-29 ENCOUNTER — PRE-ADMISSION TESTING (OUTPATIENT)
Dept: PREADMISSION TESTING | Facility: HOSPITAL | Age: 79
End: 2024-10-29
Payer: MEDICARE

## 2024-10-29 VITALS
HEIGHT: 63 IN | HEART RATE: 72 BPM | SYSTOLIC BLOOD PRESSURE: 140 MMHG | RESPIRATION RATE: 16 BRPM | DIASTOLIC BLOOD PRESSURE: 81 MMHG | WEIGHT: 263.8 LBS | BODY MASS INDEX: 46.74 KG/M2 | OXYGEN SATURATION: 96 %

## 2024-10-29 LAB
ANION GAP SERPL CALCULATED.3IONS-SCNC: 12.4 MMOL/L (ref 5–15)
BUN SERPL-MCNC: 30 MG/DL (ref 8–23)
BUN/CREAT SERPL: 31.3 (ref 7–25)
CALCIUM SPEC-SCNC: 10.3 MG/DL (ref 8.6–10.5)
CHLORIDE SERPL-SCNC: 103 MMOL/L (ref 98–107)
CO2 SERPL-SCNC: 24.6 MMOL/L (ref 22–29)
CREAT SERPL-MCNC: 0.96 MG/DL (ref 0.57–1)
DEPRECATED RDW RBC AUTO: 48.6 FL (ref 37–54)
EGFRCR SERPLBLD CKD-EPI 2021: 59.9 ML/MIN/1.73
ERYTHROCYTE [DISTWIDTH] IN BLOOD BY AUTOMATED COUNT: 14.6 % (ref 12.3–15.4)
GLUCOSE SERPL-MCNC: 91 MG/DL (ref 65–99)
HBA1C MFR BLD: 5.79 % (ref 4.8–5.6)
HCT VFR BLD AUTO: 44.9 % (ref 34–46.6)
HGB BLD-MCNC: 15 G/DL (ref 12–15.9)
MCH RBC QN AUTO: 30.5 PG (ref 26.6–33)
MCHC RBC AUTO-ENTMCNC: 33.4 G/DL (ref 31.5–35.7)
MCV RBC AUTO: 91.4 FL (ref 79–97)
PLATELET # BLD AUTO: 279 10*3/MM3 (ref 140–450)
PMV BLD AUTO: 10.8 FL (ref 6–12)
POTASSIUM SERPL-SCNC: 3.7 MMOL/L (ref 3.5–5.2)
RBC # BLD AUTO: 4.91 10*6/MM3 (ref 3.77–5.28)
SODIUM SERPL-SCNC: 140 MMOL/L (ref 136–145)
WBC NRBC COR # BLD AUTO: 7.48 10*3/MM3 (ref 3.4–10.8)

## 2024-10-29 PROCEDURE — 83036 HEMOGLOBIN GLYCOSYLATED A1C: CPT | Performed by: ORTHOPAEDIC SURGERY

## 2024-10-29 PROCEDURE — 36415 COLL VENOUS BLD VENIPUNCTURE: CPT

## 2024-10-29 PROCEDURE — 80048 BASIC METABOLIC PNL TOTAL CA: CPT | Performed by: ORTHOPAEDIC SURGERY

## 2024-10-29 PROCEDURE — 85027 COMPLETE CBC AUTOMATED: CPT | Performed by: ORTHOPAEDIC SURGERY

## 2024-10-29 NOTE — PAT
Pt here for PAT visit.  Pre-op tests completed, chg soap given, and instructions reviewed.  Instructed clears until 2 hrs prior to arrival time and to bring cpap, voiced understanding. ERAS and SUSU dressing handouts given and reviewed, voiced understanding. Cardiac clearance in notes.

## 2024-10-29 NOTE — DISCHARGE INSTRUCTIONS
PRE-ADMISSION TESTING INSTRUCTIONS FOR TOTAL JOINT PATIENTS    Take these medications the morning of surgery with a small sip of water:  metoprolol      No aspirin, advil, aleve, ibuprofen, naproxen for 3 days prior to surgery, no diet pills, decongestants, or vitamin/herbal supplements for 2 weeks prior to your surgery.    Hold xarelto 2 days prior to surgery    Tylenol/Acetaminophen ok to take if needed.    Do not take any insulin or diabetes medications the morning of surgery.    Your surgeon may give you Bactroban to use prior to surgery. This will be started 5 days prior to surgery, follow the directions on your prescription from your surgeon for use.      General Instructions:    DO NOT EAT SOLID FOOD AFTER MIDNIGHT THE NIGHT BEFORE SURGERY. No gum, mints, or hard candy after midnight the night before surgery.  You may drink clear liquids the day of surgery up until 2 hours before your arrival time.  Clear liquids are liquids you can see through. Nothing RED in color.    Plain water    Sports drinks      Gelatin (Jell-O)  Fruit juices without pulp such as white grape juice and apple juice  Popsicles that contain no fruit or yogurt  Tea or coffee (no cream or milk added)    It is beneficial for you to have a clear drink that contains carbohydrates 2 hours prior to your arrival time.  DRINK A BOTTLE OF SPORTS DRINK 2 HOURS BEFORE YOUR ARRIVAL TIME. IF YOU ARE DIABETIC, DRINK A LOW OR NO SUGAR SPORTS DRINK. ANY COLOR EXCEPT RED.    Patients who avoid smoking, chewing tobacco and alcohol for 4 weeks prior to surgery have a reduced risk of post-operative complications.  If at all possible, quit smoking as many days before surgery as you can.    Do not smoke, use chewing tobacco or drink alcohol after midnight the day of surgery.    Bring your C-PAP/ BI-PAP machine if you use one.  Wear clean comfortable clothes.  Do not wear contact lenses, lotion, deodorant, or make-up.  Bring a case for your glasses if  applicable.   You may brush your teeth the morning of surgery.  You may wear dentures/partials, do not put adhesive/glue on them.  Leave all other jewelry and valuables at home.  NOTIFY YOUR SURGEON IF YOU BECOME ILL, HAVE A FEVER, PRODUCTIVE COUGH, OR CANNOT BE HERE THE DAY OF SURGERY.      Preventing a Surgical Site Infection:    Shower the night before and on the morning of surgery using the chlorhexidine soap you were given.  Use a clean washcloth with the soap.  Place clean sheets on your bed after showering the night before surgery. Do not use the CHG soap on your hair, face, or private areas. Wash your body gently for five (5) minutes. Do not scrub your skin.  Dry with a clean towel and dress in clean clothing.    Do not shave the surgical area for 10 days-2 weeks prior to surgery  because the razor can irritate skin and make it easier to develop an infection.  Make sure you, your family, and all healthcare providers clean their hands with soap and water or an alcohol based hand  before caring for you or your wound.      Day of surgery:    Your surgeon’s office will advise you of your arrival time for the day of surgery.    Upon arrival, a Pre-op nurse and Anesthesia provider will review your health history, obtain vital signs, and answer questions you may have. The anesthesia provider will also discuss the type of anesthesia that will be needed for your procedure, which may include general anesthesia. The only belongings needed at this time will be your home medications and if applicable your C-PAP/BI-PAP machine.  If you are staying overnight your family can leave the rest of your belongings in the car and bring them to your room later.  A Pre-op nurse will start an IV and you may receive medication in preparation for surgery, including something to help you relax.  Your family will be able to see you in the Pre-op area.  While you are in surgery your family should notify the waiting room   if they leave the waiting room area and provide a contact phone number.    If you have any questions, you can call the Pre-Admission Department at [phone] or your surgeon's office.    Please be aware that surgery does come with discomfort.  We want to make every effort to control your discomfort so please discuss any uncontrolled symptoms with your nurse.   Your doctor will most likely have prescribed pain medications.     You may have bruising or discomfort from the tourniquet used in surgery.     Please leave all luggage in the car the morning of surgery.  You will be transported to your hospital room following the recovery period.  Your family can get your luggage at that time.      You may receive a survey regarding the care you received. Your feedback is very important and will be used to collect the necessary data to help us to continue to provide excellent care.

## 2024-11-05 ENCOUNTER — OFFICE VISIT (OUTPATIENT)
Dept: CARDIOLOGY | Facility: CLINIC | Age: 79
End: 2024-11-05
Payer: MEDICARE

## 2024-11-05 VITALS
SYSTOLIC BLOOD PRESSURE: 128 MMHG | BODY MASS INDEX: 46.6 KG/M2 | WEIGHT: 263 LBS | HEIGHT: 63 IN | DIASTOLIC BLOOD PRESSURE: 85 MMHG | HEART RATE: 100 BPM

## 2024-11-05 DIAGNOSIS — I48.21 PERMANENT ATRIAL FIBRILLATION: Primary | ICD-10-CM

## 2024-11-05 DIAGNOSIS — I10 PRIMARY HYPERTENSION: ICD-10-CM

## 2024-11-05 DIAGNOSIS — I49.3 VENTRICULAR PREMATURE BEATS: ICD-10-CM

## 2024-11-05 DIAGNOSIS — E78.2 MIXED HYPERLIPIDEMIA: ICD-10-CM

## 2024-11-05 PROCEDURE — 3074F SYST BP LT 130 MM HG: CPT | Performed by: FAMILY MEDICINE

## 2024-11-05 PROCEDURE — 99214 OFFICE O/P EST MOD 30 MIN: CPT | Performed by: FAMILY MEDICINE

## 2024-11-05 PROCEDURE — 93000 ELECTROCARDIOGRAM COMPLETE: CPT | Performed by: FAMILY MEDICINE

## 2024-11-05 PROCEDURE — 3079F DIAST BP 80-89 MM HG: CPT | Performed by: FAMILY MEDICINE

## 2024-11-05 PROCEDURE — 1160F RVW MEDS BY RX/DR IN RCRD: CPT | Performed by: FAMILY MEDICINE

## 2024-11-05 PROCEDURE — 1159F MED LIST DOCD IN RCRD: CPT | Performed by: FAMILY MEDICINE

## 2024-11-05 NOTE — PROGRESS NOTES
Date of Office Visit: 2024  Encounter Provider: MARCELINO Zeng  Place of Service: [facility] CARDIOLOGY  Established cardiologist: Brit Mejia MD  Patient Name: Prema Hawthorne  :1945      Patient ID:  Prema Hawthorne is a 79 y.o. female is here for  followup    With a pertinent medical history of atrial fibrillation, hypertension, DAVID.     History of Present Illness  Seen in chest pain unit  with shortness of breath fatigue found to be A-fib RVR.  EF 35%.  She was admitted had JIGAR and cardioversion.  Back in A-fib when she presented later for a stress test. [facility]  with no CAD.  Converted spontaneously on amiodarone.  Repeat echo later in  showed recovered EF at 67% no significant VHD.     She does have obstructive sleep apnea and follows with Dr. Tolentino     She had a nonischemic stress PET study done 3/19/2019.  She had an echocardiogram done 2019 showing ejection fraction 51-55% with moderate septal hypokinesis, mild concentric left ventricle hypertrophy, severe left atrial enlargement, moderate to severe right atrial enlargement.     Her  has dementia and she is his caregiver.     She saw Dr. Mejia 2024.  Her blood pressure was elevated and HCTZ 25 mg was added in the morning.  Ramipril to be taken at nighttime.  In anticipation of potential knee surgery an echocardiogram and stress nuclear study were ordered.    Echo 2024 showed EF 56 to 60%.  Mild LVH.  Severely dilated left atrial cavity.  Mildly dilated right atrial cavity.  Mild MR.  Mild TR mildly elevated RVSP.  Stress test does not show significant ischemia.  She was deemed at acceptable risk to undergo knee surgery.    Today Prema is feeling well.  Doing fine on recent medication changes.  She has not been checking blood pressure at home.  Right TKA scheduled for 2024.  She has been working on weight loss and exercises as advised by orthopedic surgery.  She has  "not had any chest pain or pressure, shortness of breath, SANCHEZ, PND, lightheadedness, dizziness, presyncope/syncope, heart racing, palpitations, or leg swelling.    Current Outpatient Medications on File Prior to Visit   Medication Sig Dispense Refill    hydroCHLOROthiazide 25 MG tablet Take 1 tablet by mouth Every Morning. 90 tablet 3    metoprolol succinate XL (TOPROL-XL) 100 MG 24 hr tablet TAKE 1 TABLET BY MOUTH TWICE  DAILY (Patient taking differently: Take 1 tablet by mouth 2 (Two) Times a Day. Take dos) 180 tablet 3    ramipril (ALTACE) 2.5 MG capsule Take 1 capsule by mouth Every Night. 90 capsule 3    Turmeric-Fish Oil 125-600 MG capsule delayed-release Take 2 capsules by mouth Daily.      Ubiquinol 100 MG capsule Take 1 capsule by mouth Daily.      Unable to find Take 1 each by mouth Daily. Med Name: Collagen + multi powder      Vitamin A-Vitamin D-Minerals (Super D3 Complex) capsule Take 2 capsules by mouth Daily.      Xarelto 20 MG tablet TAKE 1 TABLET BY MOUTH DAILY  WITH DINNER (Patient taking differently: Take 1 tablet by mouth Daily With Dinner. Hold 2d prior dos per cardiology) 90 tablet 3     No current facility-administered medications on file prior to visit.         Procedures    ECG 12 Lead    Date/Time: 11/5/2024 1:40 PM  Performed by: Michelle Pereira APRN    Authorized by: Michelle Pereira APRN  Comparison: compared with previous ECG from 8/6/2024  Similar to previous ECG  Comparison to previous ECG: Minimal st segment abnormalities in inferior leads following ventricular ectopy, reviewed with RM, no acute changes seen on ecg   Rhythm: atrial fibrillation  Ectopy: multifocal PVCs  BPM: 90              Objective:      Vitals:    11/05/24 1250   BP: 132/80   Pulse: 100   Weight: 119 kg (263 lb)   Height: 160 cm (63\")     Body mass index is 46.59 kg/m².  Wt Readings from Last 3 Encounters:   11/05/24 119 kg (263 lb)   10/29/24 120 kg (263 lb 12.8 oz)   08/21/24 123 kg (272 lb) "         Cardiovascular:      Normal rate. Irregular rhythm.      Murmurs: There is no murmur.   Pulses:     Radial: 2+ bilaterally.     Dorsalis pedis: 2+ bilaterally.     Posterior tibial: 2+ bilaterally.  Edema:     Pretibial: trace edema of the right pretibial area.     Ankle: trace edema of the right ankle.        Lab Review:      Lab Results   Component Value Date    TSH 2.250 01/20/2021       Lab Results   Component Value Date    CHOL 217 (H) 01/11/2019    CHOL 222 (H) 03/11/2016    CHLPL 243 (H) 06/10/2022    CHLPL 251 (H) 03/04/2022    CHLPL 213 (H) 01/20/2021     Lab Results   Component Value Date    TRIG 156 (H) 06/10/2022    TRIG 106 03/04/2022    TRIG 84 01/20/2021     Lab Results   Component Value Date    HDL 49 06/10/2022    HDL 60 03/04/2022    HDL 55 01/20/2021     Lab Results   Component Value Date     (H) 06/10/2022     (H) 03/04/2022     (H) 01/20/2021       Lab Results   Component Value Date    WBC 7.48 10/29/2024    HGB 15.0 10/29/2024    HCT 44.9 10/29/2024    MCV 91.4 10/29/2024     10/29/2024       Lab Results   Component Value Date    GLUCOSE 91 10/29/2024    BUN 30 (H) 10/29/2024    CREATININE 0.96 10/29/2024    EGFRRESULT 74 03/10/2022    EGFR 59.9 (L) 10/29/2024    BCR 31.3 (H) 10/29/2024    K 3.7 10/29/2024    CO2 24.6 10/29/2024    CALCIUM 10.3 10/29/2024    PROTENTOTREF 7.8 03/04/2022    ALBUMIN 4.30 03/11/2022    BILITOT 0.6 03/11/2022    AST 26 03/11/2022    ALT 23 03/11/2022       Lab Results   Component Value Date    HGBA1C 5.79 (H) 10/29/2024       Assessment:     1. Permanent atrial fibrillation    2. Ventricular premature beats    3. Primary hypertension    4. Mixed hyperlipidemia      Atrial fibrillation, rate controlled, on Xarelto.  Controlled with metoprolol.  H/o nonischemic cardiomyopathy due to atrial fibrillation with RVR.  Her LV function was normal on echo 5/2011, normal EF on TTE 8/2024  Hypertension, goal <120/80.  Blood pressure now  controlled with recent addition of HCTZ 25 mg in the morning move ramipril to nighttime.  In the past, she has sustained hyperkalemia with higher doses of ramipril.  DAVID, on CPAP  Obesity  PVCs.   Bilateral knee pain, will need knee replacement.- scheduled for 11/14/2, clearance letter was already provided     Plan:   No medication changes were made  Return in about 6 months (around 5/5/2025) for Dr. Mejia.        Thank you for allowing me to participate in this patient's care. Please call with any questions or concerns.          Dragon dictation device was utilized in this note.

## 2024-11-08 NOTE — CASE MANAGEMENT/SOCIAL WORK
Continued Stay Note  BREONNA Israel     Patient Name: Prema Hawthorne  MRN: [id number]  Today's Date: 11/8/2024    Admit Date: (Not on file)        Discharge Plan       Row Name 11/08/24 1244       Plan    Plan Comments Patient states she has a rolling walker and BS  at home. CM also discussed with patient that [place] with Dr. Hendricks's office has arranged [facility] to follow in the home for PT and she verbalized understanding and is agreeable to this service. CM will follow.                   Discharge Codes    No documentation.                       Zora Christensen RN

## 2024-11-12 ENCOUNTER — ANESTHESIA EVENT (OUTPATIENT)
Dept: PERIOP | Facility: HOSPITAL | Age: 79
End: 2024-11-12
Payer: MEDICARE

## 2024-11-13 DIAGNOSIS — I50.22 SYSTOLIC CHF, CHRONIC: ICD-10-CM

## 2024-11-13 RX ORDER — RAMIPRIL 2.5 MG/1
2.5 CAPSULE ORAL NIGHTLY
Qty: 90 CAPSULE | Refills: 3 | Status: SHIPPED | OUTPATIENT
Start: 2024-11-13

## 2024-11-14 ENCOUNTER — HOSPITAL ENCOUNTER (OUTPATIENT)
Facility: HOSPITAL | Age: 79
Discharge: HOME | End: 2024-11-15
Attending: ORTHOPAEDIC SURGERY | Admitting: ORTHOPAEDIC SURGERY
Payer: MEDICARE

## 2024-11-14 ENCOUNTER — APPOINTMENT (OUTPATIENT)
Dept: ULTRASOUND IMAGING | Facility: HOSPITAL | Age: 79
End: 2024-11-14
Payer: MEDICARE

## 2024-11-14 ENCOUNTER — APPOINTMENT (OUTPATIENT)
Dept: GENERAL RADIOLOGY | Facility: HOSPITAL | Age: 79
End: 2024-11-14
Payer: MEDICARE

## 2024-11-14 ENCOUNTER — ANESTHESIA (OUTPATIENT)
Dept: PERIOP | Facility: HOSPITAL | Age: 79
End: 2024-11-14
Payer: MEDICARE

## 2024-11-14 DIAGNOSIS — M17.11 ARTHRITIS OF RIGHT KNEE: Primary | ICD-10-CM

## 2024-11-14 PROCEDURE — 25010000002 KETOROLAC TROMETHAMINE PER 15 MG: Performed by: ORTHOPAEDIC SURGERY

## 2024-11-14 PROCEDURE — 25810000003 SODIUM CHLORIDE PER 500 ML: Performed by: ORTHOPAEDIC SURGERY

## 2024-11-14 PROCEDURE — 25010000002 VANCOMYCIN 1 G RECONSTITUTED SOLUTION: Performed by: ORTHOPAEDIC SURGERY

## 2024-11-14 PROCEDURE — 63710000001 METOPROLOL SUCCINATE XL 50 MG TABLET SUSTAINED-RELEASE 24 HOUR: Performed by: ORTHOPAEDIC SURGERY

## 2024-11-14 PROCEDURE — 25010000002 ROPIVACAINE PER 1 MG: Performed by: ORTHOPAEDIC SURGERY

## 2024-11-14 PROCEDURE — 94799 UNLISTED PULMONARY SVC/PX: CPT

## 2024-11-14 PROCEDURE — G0378 HOSPITAL OBSERVATION PER HR: HCPCS

## 2024-11-14 PROCEDURE — 25010000002 MIDAZOLAM PER 1MG: Performed by: NURSE ANESTHETIST, CERTIFIED REGISTERED

## 2024-11-14 PROCEDURE — 25010000002 CEFAZOLIN PER 500 MG: Performed by: ORTHOPAEDIC SURGERY

## 2024-11-14 PROCEDURE — C1776 JOINT DEVICE (IMPLANTABLE): HCPCS | Performed by: ORTHOPAEDIC SURGERY

## 2024-11-14 PROCEDURE — 97161 PT EVAL LOW COMPLEX 20 MIN: CPT

## 2024-11-14 PROCEDURE — 99214 OFFICE O/P EST MOD 30 MIN: CPT | Performed by: FAMILY MEDICINE

## 2024-11-14 PROCEDURE — 25010000002 EPINEPHRINE PER 0.1 MG: Performed by: ORTHOPAEDIC SURGERY

## 2024-11-14 PROCEDURE — 25010000002 PHENYLEPHRINE 10 MG/ML SOLUTION: Performed by: NURSE ANESTHETIST, CERTIFIED REGISTERED

## 2024-11-14 PROCEDURE — A9270 NON-COVERED ITEM OR SERVICE: HCPCS | Performed by: ORTHOPAEDIC SURGERY

## 2024-11-14 PROCEDURE — 25010000002 PROPOFOL 200 MG/20ML EMULSION: Performed by: NURSE ANESTHETIST, CERTIFIED REGISTERED

## 2024-11-14 PROCEDURE — 25010000002 BUPIVACAINE 0.5 % SOLUTION: Performed by: NURSE ANESTHETIST, CERTIFIED REGISTERED

## 2024-11-14 PROCEDURE — 25010000002 LIDOCAINE 2% SOLUTION: Performed by: NURSE ANESTHETIST, CERTIFIED REGISTERED

## 2024-11-14 PROCEDURE — 25010000002 CEFAZOLIN 3 G RECONSTITUTED SOLUTION 1 EACH VIAL: Performed by: ORTHOPAEDIC SURGERY

## 2024-11-14 PROCEDURE — 25810000003 LACTATED RINGERS PER 1000 ML: Performed by: NURSE ANESTHETIST, CERTIFIED REGISTERED

## 2024-11-14 PROCEDURE — C1713 ANCHOR/SCREW BN/BN,TIS/BN: HCPCS | Performed by: ORTHOPAEDIC SURGERY

## 2024-11-14 PROCEDURE — 63710000001 OXYCODONE-ACETAMINOPHEN 7.5-325 MG TABLET: Performed by: ORTHOPAEDIC SURGERY

## 2024-11-14 PROCEDURE — 25010000002 DEXAMETHASONE PER 1 MG: Performed by: NURSE ANESTHETIST, CERTIFIED REGISTERED

## 2024-11-14 PROCEDURE — 25010000002 MORPHINE PER 10 MG: Performed by: ORTHOPAEDIC SURGERY

## 2024-11-14 PROCEDURE — 25010000002 TRIAMCINOLONE PER 10 MG: Performed by: ORTHOPAEDIC SURGERY

## 2024-11-14 PROCEDURE — 25010000002 BUPIVACAINE (PF) 0.25 % SOLUTION: Performed by: NURSE ANESTHETIST, CERTIFIED REGISTERED

## 2024-11-14 PROCEDURE — 94761 N-INVAS EAR/PLS OXIMETRY MLT: CPT

## 2024-11-14 PROCEDURE — 25010000002 ONDANSETRON PER 1 MG: Performed by: NURSE ANESTHETIST, CERTIFIED REGISTERED

## 2024-11-14 PROCEDURE — 73560 X-RAY EXAM OF KNEE 1 OR 2: CPT

## 2024-11-14 DEVICE — TOTL KN: Type: IMPLANTABLE DEVICE | Site: KNEE | Status: FUNCTIONAL

## 2024-11-14 DEVICE — FIXED TIBIAL TRAY CEMENTED RIGHT, SIZE 2
Type: IMPLANTABLE DEVICE | Site: KNEE | Status: FUNCTIONAL
Brand: GMK PRIMARY TOTAL KNEE SYSTEM

## 2024-11-14 DEVICE — PALACOS® MV+G IS INDICATED FOR USE AS BONE CEMENT IN ARTHROPLASTY PROCEDURES OF THE HIP, KNEE AND OTHER JOINTS TO FIX PLASTIC AND METAL PROSTHETIC PARTS TO LIVING BONE WHEN RECONSTRUCTION IS NECESSARY. THE CEMENT IS INDICATED FOR USE IN THE SECOND STAGE OF A TWO STAGE REVISION FOR TOTAL JOINT ARTHROPLASTY AFTER THE INITIAL INFECTION HAS BEEN CLEARED.PALACOS® MV+G CONTAINS THE X-RAY CONTRAST MEDIUM ZIRCONIUM DIOXIDE. TO IMPROVE VISIBILITY IN THE SURGICAL FIELD PALACOS® MV+G HAS BEEN COLOURED WITH CHLOROPHYLL (E141).THE BONE CEMENT IS PREPARED DIRECTLY BEFORE USE BY MIXING A POLYMER POWDER COMPONENT WITH A LIQUID MONOMER COMPONENT. A DUCTILE DOUGH FORMS WHICH CURES WITHIN A FEW MINUTES.
Type: IMPLANTABLE DEVICE | Site: KNEE | Status: FUNCTIONAL
Brand: PALACOS®

## 2024-11-14 DEVICE — DEV CONTRL TISS STRATAFIX SPIRAL PDS PLS CT1 2-0 45CM: Type: IMPLANTABLE DEVICE | Site: KNEE | Status: FUNCTIONAL

## 2024-11-14 DEVICE — TIBIAL INSERT FIXED SPHERE FLEX   #2/10 MM R E-CROSS
Type: IMPLANTABLE DEVICE | Site: KNEE | Status: FUNCTIONAL
Brand: GMK SPHERE TOTAL KNEE SYSTEM

## 2024-11-14 DEVICE — SCREWS PACK
Type: IMPLANTABLE DEVICE | Site: KNEE | Status: FUNCTIONAL
Brand: KNEE INSTRUMENTS

## 2024-11-14 DEVICE — DEV WND/CLS STRATAFIX ABS SPIRALPDSPLS CTX/48MM SZ1 14IN VIL: Type: IMPLANTABLE DEVICE | Site: KNEE | Status: FUNCTIONAL

## 2024-11-14 DEVICE — DEV CONTRL TISS STRATAFIX SPIRAL MNCRYL UD 3/0 PLS 30CM: Type: IMPLANTABLE DEVICE | Site: KNEE | Status: FUNCTIONAL

## 2024-11-14 DEVICE — FEMORAL COMPONENT SPHERIKA CEMENTED  S3R
Type: IMPLANTABLE DEVICE | Site: KNEE | Status: FUNCTIONAL
Brand: GMK TOTAL KNEE SYSTEM

## 2024-11-14 RX ORDER — ACETAMINOPHEN 500 MG
1000 TABLET ORAL ONCE
Status: COMPLETED | OUTPATIENT
Start: 2024-11-14 | End: 2024-11-14

## 2024-11-14 RX ORDER — FAMOTIDINE 20 MG/1
40 TABLET, FILM COATED ORAL DAILY
Status: DISCONTINUED | OUTPATIENT
Start: 2024-11-14 | End: 2024-11-15 | Stop reason: HOSPADM

## 2024-11-14 RX ORDER — PHENYLEPHRINE HYDROCHLORIDE 10 MG/ML
INJECTION INTRAVENOUS AS NEEDED
Status: DISCONTINUED | OUTPATIENT
Start: 2024-11-14 | End: 2024-11-14 | Stop reason: SURG

## 2024-11-14 RX ORDER — DEXAMETHASONE SODIUM PHOSPHATE 10 MG/ML
8 INJECTION INTRAMUSCULAR; INTRAVENOUS ONCE AS NEEDED
Status: COMPLETED | OUTPATIENT
Start: 2024-11-14 | End: 2024-11-14

## 2024-11-14 RX ORDER — BUPIVACAINE HYDROCHLORIDE 2.5 MG/ML
INJECTION, SOLUTION EPIDURAL; INFILTRATION; INTRACAUDAL
Status: COMPLETED | OUTPATIENT
Start: 2024-11-14 | End: 2024-11-14

## 2024-11-14 RX ORDER — TRANEXAMIC ACID 650 MG/1
650 TABLET ORAL 2 TIMES DAILY
Qty: 14 TABLET | Refills: 0 | Status: SHIPPED | OUTPATIENT
Start: 2024-11-14

## 2024-11-14 RX ORDER — SODIUM CHLORIDE AND POTASSIUM CHLORIDE 150; 900 MG/100ML; MG/100ML
100 INJECTION, SOLUTION INTRAVENOUS CONTINUOUS
Status: ACTIVE | OUTPATIENT
Start: 2024-11-14 | End: 2024-11-14

## 2024-11-14 RX ORDER — ACETAMINOPHEN 325 MG/1
325 TABLET ORAL EVERY 4 HOURS PRN
Status: DISCONTINUED | OUTPATIENT
Start: 2024-11-14 | End: 2024-11-15 | Stop reason: HOSPADM

## 2024-11-14 RX ORDER — LISINOPRIL 2.5 MG/1
2.5 TABLET ORAL
Status: DISCONTINUED | OUTPATIENT
Start: 2024-11-14 | End: 2024-11-15 | Stop reason: HOSPADM

## 2024-11-14 RX ORDER — SODIUM CHLORIDE, SODIUM LACTATE, POTASSIUM CHLORIDE, CALCIUM CHLORIDE 600; 310; 30; 20 MG/100ML; MG/100ML; MG/100ML; MG/100ML
9 INJECTION, SOLUTION INTRAVENOUS CONTINUOUS PRN
Status: DISCONTINUED | OUTPATIENT
Start: 2024-11-14 | End: 2024-11-14 | Stop reason: HOSPADM

## 2024-11-14 RX ORDER — LIDOCAINE HYDROCHLORIDE 20 MG/ML
INJECTION, SOLUTION INFILTRATION; PERINEURAL AS NEEDED
Status: DISCONTINUED | OUTPATIENT
Start: 2024-11-14 | End: 2024-11-14 | Stop reason: SURG

## 2024-11-14 RX ORDER — FAMOTIDINE 10 MG/ML
20 INJECTION, SOLUTION INTRAVENOUS
Status: COMPLETED | OUTPATIENT
Start: 2024-11-14 | End: 2024-11-14

## 2024-11-14 RX ORDER — TRAMADOL HYDROCHLORIDE 50 MG/1
50 TABLET ORAL EVERY 8 HOURS PRN
Status: DISCONTINUED | OUTPATIENT
Start: 2024-11-14 | End: 2024-11-15 | Stop reason: HOSPADM

## 2024-11-14 RX ORDER — OXYCODONE AND ACETAMINOPHEN 7.5; 325 MG/1; MG/1
2 TABLET ORAL EVERY 4 HOURS PRN
Status: DISCONTINUED | OUTPATIENT
Start: 2024-11-14 | End: 2024-11-15 | Stop reason: HOSPADM

## 2024-11-14 RX ORDER — SODIUM CHLORIDE 0.9 % (FLUSH) 0.9 %
10 SYRINGE (ML) INJECTION EVERY 12 HOURS SCHEDULED
Status: DISCONTINUED | OUTPATIENT
Start: 2024-11-14 | End: 2024-11-14 | Stop reason: HOSPADM

## 2024-11-14 RX ORDER — KETAMINE HYDROCHLORIDE 10 MG/ML
INJECTION, SOLUTION INTRAMUSCULAR; INTRAVENOUS AS NEEDED
Status: DISCONTINUED | OUTPATIENT
Start: 2024-11-14 | End: 2024-11-14 | Stop reason: SURG

## 2024-11-14 RX ORDER — OXYCODONE AND ACETAMINOPHEN 7.5; 325 MG/1; MG/1
1 TABLET ORAL EVERY 4 HOURS PRN
Status: DISCONTINUED | OUTPATIENT
Start: 2024-11-14 | End: 2024-11-15 | Stop reason: HOSPADM

## 2024-11-14 RX ORDER — DOXYCYCLINE 100 MG/1
100 CAPSULE ORAL 2 TIMES DAILY
Qty: 14 CAPSULE | Refills: 0 | Status: SHIPPED | OUTPATIENT
Start: 2024-11-14

## 2024-11-14 RX ORDER — ONDANSETRON 2 MG/ML
4 INJECTION INTRAMUSCULAR; INTRAVENOUS EVERY 6 HOURS PRN
Status: DISCONTINUED | OUTPATIENT
Start: 2024-11-14 | End: 2024-11-15 | Stop reason: HOSPADM

## 2024-11-14 RX ORDER — DEXMEDETOMIDINE HYDROCHLORIDE 100 UG/ML
INJECTION, SOLUTION INTRAVENOUS
Status: COMPLETED | OUTPATIENT
Start: 2024-11-14 | End: 2024-11-14

## 2024-11-14 RX ORDER — ONDANSETRON 4 MG/1
4 TABLET, ORALLY DISINTEGRATING ORAL EVERY 6 HOURS PRN
Status: DISCONTINUED | OUTPATIENT
Start: 2024-11-14 | End: 2024-11-15 | Stop reason: HOSPADM

## 2024-11-14 RX ORDER — MELOXICAM 7.5 MG/1
7.5 TABLET ORAL ONCE
Status: COMPLETED | OUTPATIENT
Start: 2024-11-14 | End: 2024-11-14

## 2024-11-14 RX ORDER — ONDANSETRON 2 MG/ML
4 INJECTION INTRAMUSCULAR; INTRAVENOUS ONCE AS NEEDED
Status: COMPLETED | OUTPATIENT
Start: 2024-11-14 | End: 2024-11-14

## 2024-11-14 RX ORDER — NALOXONE HCL 0.4 MG/ML
0.4 VIAL (ML) INJECTION
Status: DISCONTINUED | OUTPATIENT
Start: 2024-11-14 | End: 2024-11-15 | Stop reason: HOSPADM

## 2024-11-14 RX ORDER — PROPOFOL 10 MG/ML
INJECTION, EMULSION INTRAVENOUS AS NEEDED
Status: DISCONTINUED | OUTPATIENT
Start: 2024-11-14 | End: 2024-11-14 | Stop reason: SURG

## 2024-11-14 RX ORDER — BUPIVACAINE HYDROCHLORIDE 5 MG/ML
INJECTION, SOLUTION PERINEURAL
Status: COMPLETED | OUTPATIENT
Start: 2024-11-14 | End: 2024-11-14

## 2024-11-14 RX ORDER — NALOXONE HCL 0.4 MG/ML
0.1 VIAL (ML) INJECTION
Status: DISCONTINUED | OUTPATIENT
Start: 2024-11-14 | End: 2024-11-15 | Stop reason: HOSPADM

## 2024-11-14 RX ORDER — EPHEDRINE SULFATE 50 MG/ML
INJECTION INTRAVENOUS AS NEEDED
Status: DISCONTINUED | OUTPATIENT
Start: 2024-11-14 | End: 2024-11-14 | Stop reason: SURG

## 2024-11-14 RX ORDER — LIDOCAINE HYDROCHLORIDE 10 MG/ML
0.5 INJECTION, SOLUTION INFILTRATION; PERINEURAL ONCE AS NEEDED
Status: DISCONTINUED | OUTPATIENT
Start: 2024-11-14 | End: 2024-11-14 | Stop reason: HOSPADM

## 2024-11-14 RX ORDER — MIDAZOLAM HYDROCHLORIDE 2 MG/2ML
0.5 INJECTION, SOLUTION INTRAMUSCULAR; INTRAVENOUS
Status: DISCONTINUED | OUTPATIENT
Start: 2024-11-14 | End: 2024-11-14 | Stop reason: HOSPADM

## 2024-11-14 RX ORDER — ONDANSETRON 4 MG/1
4 TABLET, FILM COATED ORAL EVERY 8 HOURS PRN
Qty: 30 TABLET | Refills: 0 | Status: SHIPPED | OUTPATIENT
Start: 2024-11-14

## 2024-11-14 RX ORDER — METOPROLOL SUCCINATE 50 MG/1
100 TABLET, EXTENDED RELEASE ORAL 2 TIMES DAILY
Status: DISCONTINUED | OUTPATIENT
Start: 2024-11-14 | End: 2024-11-15 | Stop reason: HOSPADM

## 2024-11-14 RX ORDER — HYDROCHLOROTHIAZIDE 25 MG/1
25 TABLET ORAL EVERY MORNING
Status: DISCONTINUED | OUTPATIENT
Start: 2024-11-14 | End: 2024-11-15 | Stop reason: HOSPADM

## 2024-11-14 RX ORDER — HYDROMORPHONE HYDROCHLORIDE 1 MG/ML
0.5 INJECTION, SOLUTION INTRAMUSCULAR; INTRAVENOUS; SUBCUTANEOUS
Status: DISCONTINUED | OUTPATIENT
Start: 2024-11-14 | End: 2024-11-15 | Stop reason: HOSPADM

## 2024-11-14 RX ORDER — SODIUM CHLORIDE 9 MG/ML
INJECTION, SOLUTION INTRAVENOUS AS NEEDED
Status: DISCONTINUED | OUTPATIENT
Start: 2024-11-14 | End: 2024-11-14 | Stop reason: HOSPADM

## 2024-11-14 RX ORDER — VANCOMYCIN HYDROCHLORIDE 1 G/20ML
INJECTION, POWDER, LYOPHILIZED, FOR SOLUTION INTRAVENOUS AS NEEDED
Status: DISCONTINUED | OUTPATIENT
Start: 2024-11-14 | End: 2024-11-14 | Stop reason: HOSPADM

## 2024-11-14 RX ORDER — OXYCODONE AND ACETAMINOPHEN 7.5; 325 MG/1; MG/1
1 TABLET ORAL EVERY 4 HOURS PRN
Qty: 40 TABLET | Refills: 0 | Status: SHIPPED | OUTPATIENT
Start: 2024-11-14

## 2024-11-14 RX ORDER — GABAPENTIN 300 MG/1
300 CAPSULE ORAL ONCE
Status: COMPLETED | OUTPATIENT
Start: 2024-11-14 | End: 2024-11-14

## 2024-11-14 RX ORDER — SODIUM CHLORIDE 0.9 % (FLUSH) 0.9 %
10 SYRINGE (ML) INJECTION AS NEEDED
Status: DISCONTINUED | OUTPATIENT
Start: 2024-11-14 | End: 2024-11-14 | Stop reason: HOSPADM

## 2024-11-14 RX ADMIN — FAMOTIDINE 20 MG: 10 INJECTION, SOLUTION INTRAVENOUS at 06:37

## 2024-11-14 RX ADMIN — SODIUM CHLORIDE 1000 MG: 9 INJECTION, SOLUTION INTRAVENOUS at 07:51

## 2024-11-14 RX ADMIN — ACETAMINOPHEN 1000 MG: 500 TABLET ORAL at 06:37

## 2024-11-14 RX ADMIN — BUPIVACAINE HYDROCHLORIDE 15 ML: 2.5 INJECTION, SOLUTION EPIDURAL; INFILTRATION; INTRACAUDAL; PERINEURAL at 07:03

## 2024-11-14 RX ADMIN — PHENYLEPHRINE HYDROCHLORIDE 150 MCG: 10 INJECTION INTRAVENOUS at 08:45

## 2024-11-14 RX ADMIN — BUPIVACAINE HYDROCHLORIDE 15 ML: 2.5 INJECTION, SOLUTION EPIDURAL; INFILTRATION; INTRACAUDAL at 07:15

## 2024-11-14 RX ADMIN — PROPOFOL 50 MCG/KG/MIN: 10 INJECTION, EMULSION INTRAVENOUS at 07:41

## 2024-11-14 RX ADMIN — PHENYLEPHRINE HYDROCHLORIDE 100 MCG: 10 INJECTION INTRAVENOUS at 08:11

## 2024-11-14 RX ADMIN — MIDAZOLAM HYDROCHLORIDE 0.5 MG: 1 INJECTION, SOLUTION INTRAMUSCULAR; INTRAVENOUS at 06:55

## 2024-11-14 RX ADMIN — BUPIVACAINE HYDROCHLORIDE 1.8 ML: 5 INJECTION, SOLUTION PERINEURAL at 07:29

## 2024-11-14 RX ADMIN — MELOXICAM 7.5 MG: 7.5 TABLET ORAL at 06:37

## 2024-11-14 RX ADMIN — EPHEDRINE SULFATE 10 MG: 50 INJECTION INTRAVENOUS at 08:28

## 2024-11-14 RX ADMIN — CEFAZOLIN 2000 MG: 2 INJECTION, POWDER, FOR SOLUTION INTRAVENOUS at 15:55

## 2024-11-14 RX ADMIN — DEXMEDETOMIDINE HYDROCHLORIDE 15 MCG: 100 INJECTION, SOLUTION INTRAVENOUS at 07:10

## 2024-11-14 RX ADMIN — ONDANSETRON 4 MG: 2 INJECTION INTRAMUSCULAR; INTRAVENOUS at 06:37

## 2024-11-14 RX ADMIN — SODIUM CHLORIDE, POTASSIUM CHLORIDE, SODIUM LACTATE AND CALCIUM CHLORIDE 9 ML/HR: 600; 310; 30; 20 INJECTION, SOLUTION INTRAVENOUS at 06:39

## 2024-11-14 RX ADMIN — SODIUM CHLORIDE 3000 MG: 9 INJECTION, SOLUTION INTRAVENOUS at 07:45

## 2024-11-14 RX ADMIN — DEXMEDETOMIDINE 35 MCG: 100 INJECTION, SOLUTION, CONCENTRATE INTRAVENOUS at 07:03

## 2024-11-14 RX ADMIN — KETAMINE HYDROCHLORIDE 10 MG: 10 INJECTION INTRAMUSCULAR; INTRAVENOUS at 08:14

## 2024-11-14 RX ADMIN — PHENYLEPHRINE HYDROCHLORIDE 50 MCG: 10 INJECTION INTRAVENOUS at 08:47

## 2024-11-14 RX ADMIN — PHENYLEPHRINE HYDROCHLORIDE 100 MCG: 10 INJECTION INTRAVENOUS at 08:18

## 2024-11-14 RX ADMIN — CEFAZOLIN 2000 MG: 2 INJECTION, POWDER, FOR SOLUTION INTRAVENOUS at 23:29

## 2024-11-14 RX ADMIN — GABAPENTIN 300 MG: 300 CAPSULE ORAL at 06:37

## 2024-11-14 RX ADMIN — PHENYLEPHRINE HYDROCHLORIDE 100 MCG: 10 INJECTION INTRAVENOUS at 07:55

## 2024-11-14 RX ADMIN — LIDOCAINE HYDROCHLORIDE 5 ML: 20 INJECTION, SOLUTION INFILTRATION; PERINEURAL at 07:40

## 2024-11-14 RX ADMIN — PHENYLEPHRINE HYDROCHLORIDE 100 MCG: 10 INJECTION INTRAVENOUS at 07:50

## 2024-11-14 RX ADMIN — DEXAMETHASONE SODIUM PHOSPHATE 8 MG: 10 INJECTION INTRAMUSCULAR; INTRAVENOUS at 06:37

## 2024-11-14 RX ADMIN — PHENYLEPHRINE HYDROCHLORIDE 100 MCG: 10 INJECTION INTRAVENOUS at 08:14

## 2024-11-14 RX ADMIN — OXYCODONE HYDROCHLORIDE AND ACETAMINOPHEN 2 TABLET: 7.5; 325 TABLET ORAL at 19:37

## 2024-11-14 RX ADMIN — PHENYLEPHRINE HYDROCHLORIDE 100 MCG: 10 INJECTION INTRAVENOUS at 08:51

## 2024-11-14 RX ADMIN — PROPOFOL 30 MG: 10 INJECTION, EMULSION INTRAVENOUS at 07:40

## 2024-11-14 RX ADMIN — PHENYLEPHRINE HYDROCHLORIDE 100 MCG: 10 INJECTION INTRAVENOUS at 07:45

## 2024-11-14 RX ADMIN — BUPIVACAINE HYDROCHLORIDE 20 ML: 2.5 INJECTION, SOLUTION EPIDURAL; INFILTRATION; INTRACAUDAL at 07:10

## 2024-11-14 RX ADMIN — METOPROLOL SUCCINATE 100 MG: 50 TABLET, EXTENDED RELEASE ORAL at 21:07

## 2024-11-14 RX ADMIN — PHENYLEPHRINE HYDROCHLORIDE 100 MCG: 10 INJECTION INTRAVENOUS at 07:59

## 2024-11-14 NOTE — OP NOTE
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   Morbid obesity (BMI 46)   Post-operative diagnosis: Same  Procedure performed:Right total knee arthroplasty CPT 85373-92  Approach: Subvastus   Implants:  Medacta SphereTKA   Femur: 3 CR   Tibial tray: 2     Patella - none   Bearing - 10 mm Medial Pivot     Kinematic Alignment TKA    Varus   ACL torn  PCL intact    Distal Femur     Medial - worn 6 mm  Lateral - unworn 8 mm    Posterior femur    Medial - unworn 7 mm  Lateral - unworn 7 mm    Tibia    Medial spine - 9.5 mm  Lateral spine - 9.5 mm    Recut - no    Final check:  Negligible V-V in extension  2-3 mm opening w varus in 15-30 degrees of flexion      Surgeon: Kadeem Hendricks M.D.   Assistant(s):  1. CARRIE Martell  2. MARCELINO Miguel    Anesthesia: Spinal/Adductor Canal Femoral Nerve/IPAC Block  Anesthesiologist: Armin  Estimated blood loss: 50 milliliters   Drains: None  Specimens: None  Complications: None apparent     Findings: Severe knee arthritis            INDICATIONS:  Prema Hawthorne is a pleasant 79 y.o. year old who presented to my office with a long history of knee pain. The patient failed conservative therapy and we discussed surgical treatment options including total knee arthroplasty. Prior to surgery we discussed the risks, benefits, alternatives to surgery, and surgical convalescence.  Surgical consent was obtained both in the office, as well as the day of surgery. Risks of the procedure include, but are not limited to, infection, DVT, PE, damage to nerves or blood vessels at the time of surgery, bleeding and blood loss, stiffness/arthrofibrosis, and risk of loosening or failure of the components requiring revision surgery. The patient  expressed understanding and wished to proceed with the surgery. An informed consent form was signed and placed on the chart prior to coming to the Operating Room.     PROCEDURE: The patient was brought to the operating room and once obtaining satisfactory anesthesia was placed in the supine position. The knee was prepped and draped in the usual sterile fashion for a total knee replacement. The leg was exsanguinated with an Esmarch bandage and the pneumatic tourniquet inflated to 250 mm mercury. A surgical timeout was held verifying the site, procedure, and that pre-operative antibiotics had been given.  A longitudinal incision was over the anterior aspect of the knee exposing the extensor mechanism. An arthrotomy was performed and the patella displaced laterally. The gross pathologic findings revealed severe degenerative arthritis.    Due to the patient's morbid obesity, a much more time consuming and complex procedure was required.  Due to the patient's body habitus and soft tissue envelope, it was much more difficult to assess the patient's anatomy and bone resections.  As previously explained to the patient, an elevated body mass index increases the risks of morbidity and mortality during and after the procedure - including but not limited to infection, deep venous thrombosis, pulmonary thrombosis, neurovascular injury, premature failure of implants, and death.       Subperiosteal dissection was continued around the proximal medial tibia. The necessary soft tissue release was performed to correct the fixed angular deformity. Care was taken to protect and preserve the medial collateral ligament.     The drill was used to open the femoral canal. After over drilling the canal, the intramedullary femoral guide was seated and the distal femur was resected utilizing the worn and unworn paddles. The femoral sizing guide was set at neutral external rotation.  The femur was then sized and the appropriate component  selected. The anterior and posterior condyles were then resected with the appropriately sized guide and oscillating saw.  All bone resections were measured and confirmed to be appropriate thicknesses.     Then the extramedullary tibial cutting guide was then placed and the proximal tibia was resected at the appropriate level along the axis of the tibia.    With the knee at 90 degrees of flexion, laminar spacers were placed between the femur and tibia. The remaining anterior cruciate ligament was excised. The posterior cruciate ligament was left intact.  A medial and lateral meniscectomy were performed. A mixture of 60 mL of saline, 0.5% ropivacaine, 10 mg morphine, 30 mg Toradol, 0.2 mg of epinephrine, and 40 mg Kenalog were injected into the knee joint capsule posteriorly while the lamina spreaders were in position and in the deep tissues.  The solution without steroid was utilized at the capsulotomy incision sites prior to closure for local anesthesia.     The extension space was confirmed to be appropriate with full extension of the knee with an appropriately sized spacer and to be well balanced with varus and valgus stress.     The flexion and extension gaps were checked with the appropriate sized spacer and noted to be well balanced. At this time, the tibial cut was checked with the spacer block.    The tibial fixation hole was created with the tibial template and broach.     The synovium was excised from around the patella, after which the patella thickness was measured with calipers.  The patella was then prepared if the native thickness allowed resurfacing.  The appropriate sized patellar template was seated and the three fixation holes were created with a drill.      A bone plug was then shaped and place in the opening created for the intramedullary femoral guide.    The trial components were then placed and the knee was found to have proper alignment and was stable through a full range of flexion and  extension. The trial components were removed and the bony surfaces were cleaned with pulsatile lavage and an antibiotic solution. The bone was then dried and the permanent components were cemented in a sequential fashion. The tibial component was cemented first.  Set in the proper position and rotation.  The tibial component was impacted into place, it was fully seated and excess cemented was removed.  The femoral component was then cemented in place followed by the patella. Excess cement was removed.     Once the cement was hardened the tibial articular surface was implanted. The knee was then reduced and found to have good flexion, full extension with good stability and proper alignment. The patella tracked central.    A dilute (0.35%) Betadine lavage was placed in the arthrotomy site to soak the components for 3 minutes prior to wound closure. The solution was made by adding 17.5 ml of 10% povidone-iodine in 500 mL of normal saline, resulting in a 0.35% dilution. This solution was then removed from the knee and the knee was copiously irrigated.    The tourniquet was then released and hemostasis was obtained with electrocautery. The knee irrigated with pulsatile lavage and antibiotic solution followed by normal saline.     The arthrotomy was closed with # 0 Vicryl interrupted sutures and #1 Stratafix barbed suture. The subcutaneous tissue was closed in layers with # 0 and # 2-0 Stratafix barbed sutures. The skin was closed with 3-0 Stratafix barbed sutures and Dermabond. A sterile compressive dressing was applied. The patient tolerated the procedure well, without complication, and was transferred to the recovery room in a stable condition. The sponge and instrument count were correct.      A surgical first assistant was required and necessary for the completion of this case to aid in manipulation and positioning of the extremity while the surgeon operated.  Their assistance was vital to the safety and success of  the procedure.     Deep venous thrombosis prophylaxis will consist of home anticoagulation according to CHEST guidelines.             ____________________________  Kadeem Hendricks M.D.

## 2024-11-14 NOTE — ANESTHESIA PROCEDURE NOTES
Peripheral Block    Pre-sedation assessment completed: 11/14/2024 6:55 AM    Patient reassessed immediately prior to procedure    Patient location during procedure: pre-op  Start time: 11/14/2024 7:12 AM  Stop time: 11/14/2024 7:15 AM  Reason for block: at surgeon's request, post-op pain management and secondary anesthetic  Performed by  CRNA/CAA: Perri Ray, CRNASRNA: Kadeem Funez, JOAQUÍN  Preanesthetic Checklist  Completed: patient identified, IV checked, site marked, risks and benefits discussed, surgical consent, monitors and equipment checked, pre-op evaluation and timeout performed  Prep:  Pt Position: supine  Sterile barriers:cap, gloves, mask and washed/disinfected hands  Prep: ChloraPrep  Patient monitoring: blood pressure monitoring, continuous pulse oximetry and EKG  Procedure    Sedation: yes  Performed under: local infiltration  Guidance:ultrasound guided    ULTRASOUND INTERPRETATION.  Using ultrasound guidance a 21 G gauge needle was placed in close proximity to the nerve, at which point, under ultrasound guidance anesthetic was injected in the area of the nerve and spread of the anesthesia was seen on ultrasound in close proximity thereto.  There were no abnormalities seen on ultrasound; a digital image was taken; and the patient tolerated the procedure with no complications. Images:still images obtained, printed/placed on chart    Laterality:right  Block Type:DOROTEO  Injection Technique:single-shot  Needle Type:echogenic  Needle Gauge:21 G  Resistance on Injection: none    Medications Used: bupivacaine PF (MARCAINE) injection 0.25% - Injection   15 mL - 11/14/2024 7:15:00 AM      Post Assessment  Injection Assessment: negative aspiration for heme, no paresthesia on injection and incremental injection  Patient Tolerance:comfortable throughout block  Complications:no  Performed by: Kadeem Funez, SRNA

## 2024-11-14 NOTE — ANESTHESIA POSTPROCEDURE EVALUATION
Patient: Prema Hawthorne    Procedure Summary       Date: 11/14/24 Room / Location:  [facility] /  [facility]    Anesthesia Start: 0735 Anesthesia Stop: 0925    Procedure: TOTAL KNEE ARTHROPLASTY (Right: Knee) Diagnosis:       Osteoarthritis of right knee      (Osteoarthritis of right knee [M17.11])    Surgeons: Kadeem Hendricks MD Provider: Perri Ray CRNA    Anesthesia Type: regional, spinal, MAC ASA Status: 3            Anesthesia Type: regional, spinal, MAC    Vitals  Vitals Value Taken Time   /72 11/14/24 1015   Temp 97.6 °F (36.4 °C) 11/14/24 0930   Pulse 64 11/14/24 1017   Resp 17 11/14/24 1015   SpO2 93 % 11/14/24 1017   Vitals shown include unfiled device data.        Post Anesthesia Care and Evaluation    Patient location during evaluation: PACU  Patient participation: complete - patient participated  Level of consciousness: awake and alert  Pain score: 0  Pain management: adequate    Airway patency: patent  Anesthetic complications: No anesthetic complications  PONV Status: none  Cardiovascular status: acceptable  Respiratory status: acceptable  Hydration status: acceptable

## 2024-11-14 NOTE — ANESTHESIA PROCEDURE NOTES
Peripheral Block    Pre-sedation assessment completed: 11/14/2024 6:55 AM    Patient reassessed immediately prior to procedure    Patient location during procedure: pre-op  Start time: 11/14/2024 7:01 AM  Stop time: 11/14/2024 7:03 AM  Reason for block: at surgeon's request, post-op pain management and secondary anesthetic  Performed by  CRNA/CAA: Perri Ray CRNASRNA: Kadeem Funez SRNA  Preanesthetic Checklist  Completed: patient identified, IV checked, site marked, risks and benefits discussed, surgical consent, monitors and equipment checked, pre-op evaluation and timeout performed  Prep:  Pt Position: supine  Sterile barriers:cap, gloves, mask and washed/disinfected hands  Prep: ChloraPrep  Patient monitoring: blood pressure monitoring, continuous pulse oximetry and EKG  Procedure    Sedation: yes  Performed under: local infiltration  Guidance:ultrasound guided    ULTRASOUND INTERPRETATION.  Using ultrasound guidance a 21 G gauge needle was placed in close proximity to the nerve, at which point, under ultrasound guidance anesthetic was injected in the area of the nerve and spread of the anesthesia was seen on ultrasound in close proximity thereto.  There were no abnormalities seen on ultrasound; a digital image was taken; and the patient tolerated the procedure with no complications. Images:still images obtained, printed/placed on chart    Laterality:right  Block Type:adductor canal block  Injection Technique:single-shot  Needle Type:echogenic  Needle Gauge:21 G  Resistance on Injection: none    Medications Used: dexmedetomidine HCl (PRECEDEX) injection - Perineural   35 mcg - 11/14/2024 7:03:00 AM  bupivacaine PF (MARCAINE) injection 0.25% - Injection   15 mL - 11/14/2024 7:03:00 AM      Post Assessment  Injection Assessment: negative aspiration for heme, no paresthesia on injection and incremental injection  Patient Tolerance:comfortable throughout block  Complications:no  Performed by: Kadeem Funez  P, SRNA

## 2024-11-14 NOTE — ANESTHESIA PROCEDURE NOTES
Peripheral Block    Pre-sedation assessment completed: 11/14/2024 6:55 AM    Patient reassessed immediately prior to procedure    Patient location during procedure: pre-op  Start time: 11/14/2024 7:05 AM  Stop time: 11/14/2024 7:10 AM  Reason for block: at surgeon's request, post-op pain management and secondary anesthetic  Performed by  CRNA/CAA: Perri Ray CRNASRNA: Kadeem Funez SRNA  Preanesthetic Checklist  Completed: patient identified, IV checked, site marked, risks and benefits discussed, surgical consent, monitors and equipment checked, pre-op evaluation and timeout performed  Prep:  Pt Position: supine  Sterile barriers:cap, gloves, mask and washed/disinfected hands  Prep: ChloraPrep  Patient monitoring: blood pressure monitoring, continuous pulse oximetry and EKG  Procedure    Sedation: yes  Performed under: local infiltration  Guidance:ultrasound guided    ULTRASOUND INTERPRETATION.  Using ultrasound guidance a 21 G gauge needle was placed in close proximity to the nerve, at which point, under ultrasound guidance anesthetic was injected in the area of the nerve and spread of the anesthesia was seen on ultrasound in close proximity thereto.  There were no abnormalities seen on ultrasound; a digital image was taken; and the patient tolerated the procedure with no complications. Images:still images obtained, printed/placed on chart    Laterality:right  Block Type:iPack  Injection Technique:single-shot  Needle Type:echogenic  Needle Gauge:21 G  Resistance on Injection: none    Medications Used: dexmedetomidine HCl (PRECEDEX) injection - Intravenous   15 mcg - 11/14/2024 7:10:00 AM  bupivacaine PF (MARCAINE) injection 0.25% - Injection   20 mL - 11/14/2024 7:10:00 AM      Post Assessment  Injection Assessment: negative aspiration for heme, no paresthesia on injection and incremental injection  Patient Tolerance:comfortable throughout block  Complications:no  Performed by: Kadeem Funez,  SRNA

## 2024-11-14 NOTE — THERAPY EVALUATION
Patient Name: Prema Hawthorne  : 1945    MRN: [id number]                              Today's Date: 2024       Admit Date: 2024    Visit Dx:     ICD-10-CM ICD-9-CM   1. Arthritis of right knee  M17.11 716.96     Patient Active Problem List   Diagnosis    Permanent atrial fibrillation    Hypertension    Morbid obesity    DAVID on CPAP    Ventricular premature beats    Sleep related hypoxia    Hyperlipidemia    Vitamin D deficiency    Post-menopausal bleeding    Arthritis of right knee     Past Medical History:   Diagnosis Date    Arrhythmia 3/2011    Arthritis     Atrial fibrillation     with rapid ventricular response (CHRONIC)    Cancer 2021    Uterine cancer detected    Cardiomyopathy     nonischemic, Dr Mejia follows, clearance in notes    Essential hypertension     Hyperlipidemia     Knee pain, bilateral     GETS INJECTIONS EVERY 3 MONTHS    Morbid obesity     On anticoagulant therapy     xarelto    DAVID (obstructive sleep apnea)     CPAP nightly    PONV (postoperative nausea and vomiting)     Postmenopausal bleeding     PVC (premature ventricular contraction) 2016    Shortness of breath     EXERTION    Systolic heart failure      Past Surgical History:   Procedure Laterality Date    BLADDER SURGERY      CARDIAC CATHETERIZATION  2011    CATARACT EXTRACTION, BILATERAL      COLONOSCOPY      D & C HYSTEROSCOPY N/A 2021    Procedure: HYSTEROSCOPY WITH  DILATATION AND CURETTAGE, POLYPECTOMY WITH MYOSURE;  Surgeon: Christiane Goldman MD;  [facility]: [facility];  Service: Obstetrics/Gynecology;  Laterality: N/A;    HYSTERECTOMY  2021      General Information       Row Name 24 1320          Physical Therapy Time and Intention    Document Type evaluation  -JW     Mode of Treatment physical therapy  -JW       Row Name 24 1320          General Information    Patient Profile Reviewed yes  pt s/p right TKA, WBAT  -JW     Prior Level of Function independent:;all household  mobility;community mobility  pt reports using a rolling walker for mobility at home  -     Existing Precautions/Restrictions fall  -     Barriers to Rehab none identified  -       Row Name 11/14/24 1320          Living Environment    People in Home spouse  -       Row Name 11/14/24 1320          Home Main Entrance    Number of Stairs, Main Entrance none  -       Row Name 11/14/24 1320          Stairs Within Home, Primary    Stairs, Within Home, Primary single story condo  -       Row Name 11/14/24 1320          Cognition    Orientation Status (Cognition) oriented x 3  -               User Key  (r) = Recorded By, (t) = Taken By, (c) = Cosigned By      Initials Name Provider Type    JW Ivis Sharp, PT Physical Therapist                   Mobility       Row Name 11/14/24 1320          Bed Mobility    Bed Mobility supine-sit  -     Supine-Sit [place] (Bed Mobility) minimum assist (75% patient effort);verbal cues  -     Assistive Device (Bed Mobility) bed rails;head of bed elevated  -       Row Name 11/14/24 1320          Transfers    Comment, (Transfers) verbal cues for hand placement  -       Row Name 11/14/24 1320          Sit-Stand Transfer    Sit-Stand [place] (Transfers) contact guard;verbal cues  -     Assistive Device (Sit-Stand Transfers) walker, front-wheeled  -       Row Name 11/14/24 1320          Gait/Stairs (Locomotion)    [place] Level (Gait) contact guard;verbal cues  -     Assistive Device (Gait) walker, front-wheeled  -     Patient was able to Ambulate yes  -     Distance in Feet (Gait) 25  -     Deviations/Abnormal Patterns (Gait) steve decreased  -     Bilateral Gait Deviations forward flexed posture  -     Comment, (Gait/Stairs) pt reports some continued decreased sensation/coordination from surgical block, requires cues for safety  -       Row Name 11/14/24 1320          Mobility    Extremity Weight-bearing Status right lower extremity   -     Right Lower Extremity (Weight-bearing Status) weight-bearing as tolerated (WBAT)  -               User Key  (r) = Recorded By, (t) = Taken By, (c) = Cosigned By      Initials Name Provider Type    Ivis Ferrera PT Physical Therapist                   Obj/Interventions       [facility] Name 11/14/24 1320          Range of Motion Comprehensive    Comment, General Range of Motion left LE ROM WFL, right LE not formally tested  -[facility] Name 11/14/24 1320          Strength Comprehensive (MMT)    Comment, General Manual Muscle Testing (MMT) Assessment left LE strength functional, right LE not formally tested  -[facility] Name 11/14/24 1320          Balance    Comment, Balance SBA for sitting balance on EOB  -[facility] Name 11/14/24 1320          Sensory Assessment (Somatosensory)    Sensory Assessment (Somatosensory) other (see comments)  pt reports some decreased sensation in right LE due to surgical block  -               User Key  (r) = Recorded By, (t) = Taken By, (c) = Cosigned By      Initials Name Provider Type    Ivis Ferrera PT Physical Therapist                   Goals/Plan       [facility] Name 11/14/24 1320          Bed Mobility Goal 1 (PT)    Activity/Assistive Device (Bed Mobility Goal 1, PT) bed mobility activities, all  -JW     [place] Level/Cues Needed (Bed Mobility Goal 1, PT) supervision required  -JW     Time Frame (Bed Mobility Goal 1, PT) 1 day  -JW     Progress/Outcomes (Bed Mobility Goal 1, PT) new goal  -[facility] Name 11/14/24 1320          Transfer Goal 1 (PT)    Activity/Assistive Device (Transfer Goal 1, PT) transfers, all  -JW     [place] Level/Cues Needed (Transfer Goal 1, PT) supervision required  -JW     Time Frame (Transfer Goal 1, PT) 1 day  -JW     Progress/Outcome (Transfer Goal 1, PT) new goal  -[facility] Name 11/14/24 1320          Gait Training Goal 1 (PT)    Activity/Assistive Device (Gait Training Goal 1, PT) gait (walking locomotion);assistive  "device use  -     [place] Level (Gait Training Goal 1, PT) supervision required  -     Distance (Gait Training Goal 1, PT) 100  -JW     Time Frame (Gait Training Goal 1, PT) 1 day  -     Progress/Outcome (Gait Training Goal 1, PT) new goal  -       Row Name 11/14/24 1322          Therapy Assessment/Plan (PT)    Planned Therapy Interventions (PT) balance training;bed mobility training;gait training;home exercise program;patient/family education;strengthening;transfer training  -               User Key  (r) = Recorded By, (t) = Taken By, (c) = Cosigned By      Initials Name Provider Type    Ivis Ferrera, PT Physical Therapist                   Clinical Impression       Row Name 11/14/24 1320          Pain    Pretreatment Pain Rating 0/10 - no pain  -     Posttreatment Pain Rating 0/10 - no pain  -       Row Name 11/14/24 1320          Plan of Care Review    Plan of Care Reviewed With patient  -     Outcome Evaluation Physical therapy evaluation complete.  Patient performs supine to sit with min assist and sit to stand with CGA.  Patient performs gait with rolling walker x25 feet, CGA. Patient reports some decreased sensation/coordination in right LE, requires verbal cues for safety with direction changes.  Patient will benefit from physical therapy to address deficits in strength, ROM and functional mobility.  Plan to see x1-2 additional visits, recommend home health PT at discharge.  -       Row Name 11/14/24 1328          Therapy Assessment/Plan (PT)    Patient/Family Therapy Goals Statement (PT) \"go home\"  -     Rehab Potential (PT) good  -     Criteria for Skilled Interventions Met (PT) yes;meets criteria  -     Therapy Frequency (PT) other (see comments)  1-2 visits  -     Predicted Duration of Therapy Intervention (PT) 1 day  -       Row Name 11/14/24 1326          Positioning and Restraints    Pre-Treatment Position in bed  -     Post Treatment Position chair  -JW     In " Chair notified nsg;reclined;call light within reach;encouraged to call for assist;with family/caregiver  -               User Key  (r) = Recorded By, (t) = Taken By, (c) = Cosigned By      Initials Name Provider Type    Ivis Ferrera, PT Physical Therapist                   Outcome Measures       Row Name 11/14/24 1320 11/14/24 1046       How much help from another person do you currently need...    Turning from your back to your side while in flat bed without using bedrails? 3  -JW 1  -JWA    Moving from lying on back to sitting on the side of a flat bed without bedrails? 3  -JW 1  -JWA    Moving to and from a bed to a chair (including a wheelchair)? 3  -JW 1  -JWA    Standing up from a chair using your arms (e.g., wheelchair, bedside chair)? 3  -JW 1  -JWA    Climbing 3-5 steps with a railing? 2  -JW 1  -JWA    To walk in hospital room? 3  -JW 1  -JWA    AM-PAC 6 Clicks Score (PT) 17  -JW 6  -JWA    Highest Level of Mobility Goal 5 --> Static standing  -JW 2 --> Bed activities/dependent transfer  -JWA      Row Name 11/14/24 1320          Functional Assessment    Outcome Measure Options AM-PAC 6 Clicks Basic Mobility (PT)  -               User Key  (r) = Recorded By, (t) = Taken By, (c) = Cosigned By      Initials Name Provider Type    Ivis Ferrera, MARY Physical Therapist    Brandon Manuel, RN Registered Nurse                                 Physical Therapy Education       Title: PT OT SLP Therapies (In Progress)       Topic: Physical Therapy (In Progress)       Point: Mobility training (Done)       Learning Progress Summary            Patient Acceptance, E,TB, VU by  at 11/14/2024 3942                      Point: Home exercise program (Not Started)       Learner Progress:  Not documented in this visit.                              User Key       Initials Effective Dates Name Provider Type [facility] 06/16/21 -  Ivis Sharp, PT Physical Therapist PT                  PT Recommendation  and Plan  Planned Therapy Interventions (PT): balance training, bed mobility training, gait training, home exercise program, patient/family education, strengthening, transfer training  Outcome Evaluation: Physical therapy evaluation complete.  Patient performs supine to sit with min assist and sit to stand with CGA.  Patient performs gait with rolling walker x25 feet, CGA. Patient reports some decreased sensation/coordination in right LE, requires verbal cues for safety with direction changes.  Patient will benefit from physical therapy to address deficits in strength, ROM and functional mobility.  Plan to see x1-2 additional visits, recommend home health PT at discharge.     Time Calculation:   PT Evaluation Complexity  History, PT Evaluation Complexity: 1-2 personal factors and/or comorbidities  Examination of Body Systems (PT Eval Complexity): 1-2 elements  Clinical Presentation (PT Evaluation Complexity): stable  Clinical Decision Making (PT Evaluation Complexity): low complexity  Overall Complexity (PT Evaluation Complexity): low complexity     PT Charges       Row Name 11/14/24 1353             Time Calculation    Start Time 1320  -      Stop Time 1338  -      Time Calculation (min) 18 min  -ALDEN      PT Received On 11/14/24  -ALDEN      PT - Next Appointment 11/15/24  -ALDEN                User Key  (r) = Recorded By, (t) = Taken By, (c) = Cosigned By      Initials Name Provider Type    Ivsi Ferrera, PT Physical Therapist                  Therapy Charges for Today       Code Description Service Date Service Provider Modifiers Qty    [id number]  PT EVAL LOW COMPLEXITY 1 11/14/2024 Ivis Sharp, PT GP 1            PT G-Codes  Outcome Measure Options: AM-PAC 6 Clicks Basic Mobility (PT)  AM-PAC 6 Clicks Score (PT): 17  PT Discharge Summary  Anticipated Discharge Disposition (PT): home with home health    Ivis Sharp, MARY  11/14/2024

## 2024-11-14 NOTE — CONSULTS
[facility] GROUP HOSPITALIST CONSULT NOTE    Consulting Physician:  Dr Kadeem Hendricks MD Orthopedic surgery     CONSULT REASON: HTN, medical management     HISTORY OF PRESENT ILLNESS:    80 y/o female with hx of A.fib on Xarelto, HTN, CM, and DAVID on CPAP, was admitted to [facility] following right TKA.  She tolerated the procedure well.  Currently, she says she feels no pain in her legs. She denies any SOB, chest pain, nausea, lightheadedness, or dizziness.  She wears CPAP nightly and has her with her.  She says her heart rate usually stays well controlled.        Past Medical History:   Diagnosis Date    Arrhythmia 3/2011    Arthritis     Atrial fibrillation     with rapid ventricular response (CHRONIC)    Cancer 03/2021    Uterine cancer detected    Cardiomyopathy     nonischemic, Dr Mejia follows, clearance in notes    Essential hypertension     Hyperlipidemia     Knee pain, bilateral     GETS INJECTIONS EVERY 3 MONTHS    Morbid obesity     On anticoagulant therapy     xarelto    DAVID (obstructive sleep apnea) 2011    CPAP nightly    PONV (postoperative nausea and vomiting)     Postmenopausal bleeding     PVC (premature ventricular contraction) 03/25/2016    Shortness of breath     EXERTION    Systolic heart failure      Past Surgical History:   Procedure Laterality Date    BLADDER SURGERY      CARDIAC CATHETERIZATION  03/2011    CATARACT EXTRACTION, BILATERAL      COLONOSCOPY      D & C HYSTEROSCOPY N/A 03/24/2021    Procedure: HYSTEROSCOPY WITH  DILATATION AND CURETTAGE, POLYPECTOMY WITH MYOSURE;  Surgeon: Christiane Goldman MD;  [facility] [place] [facility];  Service: Obstetrics/Gynecology;  Laterality: N/A;    HYSTERECTOMY  05/2021     Family History   Problem Relation Age of Onset    Diabetes Mother     Heart disease Father     Hypertension Father     Heart disease Brother     Heart disease Brother     Hypertension Brother     Malig Hyperthermia Neg Hx      Social History     Tobacco  Use    Smoking status: Never     Passive exposure: Never    Smokeless tobacco: Never   Vaping Use    Vaping status: Never Used   Substance Use Topics    Alcohol use: Yes     Comment: occasionally / wine    Drug use: No     Comment: caffeine use      Medications Prior to Admission   Medication Sig Dispense Refill Last Dose/Taking    hydroCHLOROthiazide 25 MG tablet Take 1 tablet by mouth Every Morning. 90 tablet 3 11/13/2024    metoprolol succinate XL (TOPROL-XL) 100 MG 24 hr tablet TAKE 1 TABLET BY MOUTH TWICE  DAILY (Patient taking differently: Take 1 tablet by mouth 2 (Two) Times a Day. Take dos) 180 tablet 3 11/14/2024 at  4:00 AM    ramipril (ALTACE) 2.5 MG capsule Take 1 capsule by mouth Every Night. 90 capsule 3 11/13/2024    Turmeric-Fish Oil 125-600 MG capsule delayed-release Take 2 capsules by mouth Daily.   10/31/2024    Ubiquinol 100 MG capsule Take 1 capsule by mouth Daily.   10/31/2024    Unable to find Take 1 each by mouth Daily. Med Name: Collagen + multi powder   10/31/2024    Vitamin A-Vitamin D-Minerals (Super D3 Complex) capsule Take 2 capsules by mouth Daily.   10/31/2024    Xarelto 20 MG tablet TAKE 1 TABLET BY MOUTH DAILY  WITH DINNER (Patient taking differently: Take 1 tablet by mouth Daily With Dinner. Hold 2d prior dos per cardiology) 90 tablet 3 11/11/2024     Allergies:  Codeine    Immunization History   Administered Date(s) Administered    COVID-19 (PFIZER) Purple Cap Monovalent 02/17/2021, 03/10/2021, 12/18/2021    Covid-19 (Pfizer) Gray Cap Monovalent 02/17/2021, 03/10/2021    FLUAD TRI 65YR+ 09/09/2019    Fluad Quad 65+ 09/18/2020, 11/01/2022, 11/27/2023    Fluzone  >6mos 09/18/2020    Fluzone High-Dose 65+YRS 11/15/2023    Fluzone High-Dose 65+yrs 10/14/2021    Influenza Seasonal Injectable 10/01/2022, 11/01/2023    Influenza, Unspecified 09/28/2018    Pneumococcal Conjugate 13-Valent (PCV13) 11/12/2020    Pneumococcal Polysaccharide (PPSV23) 11/29/2021    Shingrix 09/01/2021,  "11/12/2021       REVIEW OF SYSTEMS:  Please see the above history of present illness for pertinent positives and negatives.  The remainder of the patient's systems have been reviewed and are negative.     Vital Signs  Temp:  [97.6 °F (36.4 °C)-97.7 °F (36.5 °C)] 97.6 °F (36.4 °C)  Heart Rate:  [67-91] 67  Resp:  [16-23] 17  BP: ()/(42-85) 96/61  Flowsheet Rows      Flowsheet Row First Filed Value   Admission Height 160 cm (63\") Documented at 11/14/2024 0559   Admission Weight 120 kg (263 lb 9.6 oz) Documented at 11/14/2024 0559               Physical Exam:  General:Lying in bed; on oxygen; NAD  HENT: Head is atraumatic, normocephalic  Eyes: Vision is grossly intact. Pupils appear equal and round.   Cardiovascular: RRR, S1-S2  Respiratory: Lungs are diminished at bases; no wheezing   Abdominal/GI: Morbidly obese; soft, nontender, bowel sounds present. No rebound or guarding present.   Extremities: No peripheral edema noted.   Musculoskeletal: right knee bandaged   Skin: Warm and dry.   Psych: Mood and affect are appropriate. Cooperative with exam.   Neuro: No facial asymmetry noted. No focal deficits noted, hearing and vision are grossly intact.       Results Review:    I reviewed the patient's new clinical results.  Lab Results (most recent)       None            Imaging Results (Most Recent)       Procedure Component Value Units Date/Time    XR Knee 1 or 2 View Right [[id number]] Collected: 11/14/24 0954     Updated: 11/14/24 0958    Narrative:      XR KNEE 1 OR 2 VW RIGHT    Date of Exam: 11/14/2024 9:50 AM EST    Indication: Post-Op Knee Arthoplasty    Comparison: None available.    Findings:  Surgical changes of right knee arthroplasty. No evidence of hardware complication. Expected surgical changes in the surrounding soft tissues.      Impression:      Impression:  Right knee arthroplasty without evidence of hardware complication.      Electronically Signed: Ezio Lanza MD    11/14/2024 9:54 AM EST    " "Workstation ID: [id number]     [employer] Portable [[id number]] Resulted: 11/14/24 0550     Updated: 11/14/24 0550    Narrative:      This procedure was auto-finalized with no dictation required.          reviewed    ECG/EMG Results (most recent)       None          reviewed    Assessment & Plan     HTN  BP low normal currently; likely from anesthesia  Hold HCTZ/lisinopril today  Continue metoprolol as able   Monitor    PAF  Rate controlled  Continue Metoprolol   Resume Xarelto when ok with Ortho     Hyperlipidemia  Continue statin    Hx of CM  ECHO from 8/24 reviewed; EF 56%  Monitor volume status    DAVID  Ok to use home CPAP  Order placed     Morbid obesity  Complicates all aspects of care     Right knee OA  S/p right TKA  Continue routine post-op care and pain control per primary team      Thanks for the consult; we will continue to follow along during patient's hospital stay           Oliver Sales DO  08/16/2024   10:34 EST     At [facility], we believe that sharing information builds trust and better relationships. You are receiving this note because you recently visited [facility]. It is possible you will see health information before a provider has talked with you about it. This kind of information can be easy to misunderstand. To help you fully understand what it means for your health, we urge you to discuss this note with your provider.     \"Dictated utilizing Dragon dictation\"    "

## 2024-11-14 NOTE — ANESTHESIA PROCEDURE NOTES
Spinal Block    Pre-sedation assessment completed: 11/14/2024 6:55 AM    Patient reassessed immediately prior to procedure    Patient location during procedure: pre-op  Start Time: 11/14/2024 7:21 AM  Stop Time: 11/14/2024 7:29 AM  Indication:at surgeon's request and post-op pain management  Performed By  CRNA/CAA: Perri Ray, CRNASRNA: Kadeem Funez SRNA  Preanesthetic Checklist  Completed: patient identified, IV checked, site marked, risks and benefits discussed, surgical consent, monitors and equipment checked, pre-op evaluation and timeout performed  Spinal Block Prep:  Patient Position:sitting  Sterile Tech:cap, gloves, mask and sterile barriers  Prep:Chloraprep  Patient Monitoring:blood pressure monitoring, continuous pulse oximetry and EKG    Spinal Block Procedure  Approach:left paramedian  Guidance:landmark technique and palpation technique  Location:L3-L4  Needle Type:Sprotte  Needle Gauge:25 G  Placement of Spinal needle event:cerebrospinal fluid aspirated  Paresthesia: no  Fluid Appearance:clear  Medications: bupivacaine (MARCAINE) injection 0.5% - Injection   1.8 mL - 11/14/2024 7:29:00 AM   Post Assessment  Patient Tolerance:patient tolerated the procedure well with no apparent complications  Complications no

## 2024-11-14 NOTE — H&P
Patient ID: Prema Hawthorne     Chief Complaint:    Painful right knee    HPI:    Prema Hawthorne is a 79 y.o. year old patient with end stage arthritis of the Right knee.  Conservative treatment has failed.  Here today for elective total knee arthroplasty.    Past Medical History:   Diagnosis Date    Arrhythmia 3/2011    Arthritis     Atrial fibrillation     with rapid ventricular response (CHRONIC)    Cancer 03/2021    Uterine cancer detected    Cardiomyopathy     nonischemic, Dr Mejia follows, clearance in notes    Essential hypertension     Hyperlipidemia     Knee pain, bilateral     GETS INJECTIONS EVERY 3 MONTHS    Morbid obesity     On anticoagulant therapy     xarelto    DAVID (obstructive sleep apnea) 2011    CPAP nightly    PONV (postoperative nausea and vomiting)     Postmenopausal bleeding     PVC (premature ventricular contraction) 03/25/2016    Shortness of breath     EXERTION    Systolic heart failure      Past Surgical History:   Procedure Laterality Date    BLADDER SURGERY      CARDIAC CATHETERIZATION  03/2011    CATARACT EXTRACTION, BILATERAL      COLONOSCOPY      D & C HYSTEROSCOPY N/A 03/24/2021    Procedure: HYSTEROSCOPY WITH  DILATATION AND CURETTAGE, POLYPECTOMY WITH MYOSURE;  Surgeon: Christiane Goldman MD;  Location: [facility] OR [facility] [place];  Service: Obstetrics/Gynecology;  Laterality: N/A;    HYSTERECTOMY  05/2021     Current Facility-Administered Medications   Medication Dose Route Frequency Provider Last Rate Last Admin    ceFAZolin 3000 mg IVPB in 100 mL NS (VTB)  3,000 mg Intravenous Once Kadeem Hendricks MD        lactated ringers infusion  9 mL/hr Intravenous Continuous PRN Che Baca CRNA 9 mL/hr at 11/14/24 0639 9 mL/hr at 11/14/24 0639    lidocaine (XYLOCAINE) 1 % injection 0.5 mL  0.5 mL Intradermal Once PRN Che Baca CRNA        Midazolam HCl (PF) (VERSED) injection 0.5 mg  0.5 mg Intravenous Q10 Min PRN Analia Sanchez CRNA        ropivacaine 0.5 % 30 mL, ketorolac 30  mg, EPINEPHrine 0.2 mg solution   Intra-articular Once Kadeem Hendricks MD        sodium chloride 0.9 % flush 10 mL  10 mL Intravenous Q12H Che Baca CRNA        sodium chloride 0.9 % flush 10 mL  10 mL Intravenous PRN Che Baca CRNA        Tranexamic Acid 1,000 mg in sodium chloride 0.9 % 100 mL  1,000 mg Intravenous Once Kadeem Hendricks MD         Social History     Tobacco Use    Smoking status: Never     Passive exposure: Never    Smokeless tobacco: Never   Substance Use Topics    Alcohol use: Yes     Comment: occasionally / wine     Family History   Problem Relation Age of Onset    Diabetes Mother     Heart disease Father     Hypertension Father     Heart disease Brother     Heart disease Brother     Hypertension Brother     Malig Hyperthermia Neg Hx        Allergies   Allergen Reactions    Codeine Nausea And Vomiting     Immunization History   Administered Date(s) Administered    COVID-19 (PFIZER) Purple Cap Monovalent 02/17/2021, 03/10/2021, 12/18/2021    Covid-19 (Pfizer) Gray Cap Monovalent 02/17/2021, 03/10/2021    FLUAD TRI 65YR+ 09/09/2019    Fluad Quad 65+ 09/18/2020, 11/01/2022, 11/27/2023    Fluzone  >6mos 09/18/2020    Fluzone High-Dose 65+YRS 11/15/2023    Fluzone High-Dose 65+yrs 10/14/2021    Influenza Seasonal Injectable 10/01/2022, 11/01/2023    Influenza, Unspecified 09/28/2018    Pneumococcal Conjugate 13-Valent (PCV13) 11/12/2020    Pneumococcal Polysaccharide (PPSV23) 11/29/2021    Shingrix 09/01/2021, 11/12/2021     Vitals:    11/14/24 0618   BP: 126/82   Pulse: 82   Resp: 16   Temp:    SpO2: 96%         ROS:    All other pertinent positives and negatives as noted above in HPI.    Physical Exam:     Alert at time of exam  Emotional Behavior - stable and appropriate  Gen- healthy appearing, in no acute distress  HEENT- Normocephalic, atraumatic  Extremity- no gross deformity, see office notes, no changes  Neuro- motor and sensory grossly intact, moving all  extremities  Pulses- Present bilateral lower extremities, toes pink, BCR    Painful and restricted rom - right knee      Diagnostic Studies:     End stage arthritis of right knee.      Assessment:     right knee arthritis    Plan:      Elective right TKA today      CONSENT TO PROCEDURE/SEDATION  *  Information provided regarding procedure: Yes   *  Risk/Benefits Discussed: Yes   *  Alternative /Recovery Discussed: Yes   *  Need for Blood Discussed: Yes   *  Patient /Parent Consents to Planned Procedure/Sedation and accurately recounts discussion: Yes     The spectrum of treatment options were discussed with the patient in detail including both the nonoperative and operative treatment modalities and their respective risks and benefits.  After thorough discussion, the patient has elected to undergo surgical treatment.  The details of the surgical procedure were explained including the location of probable incisions and a description of the likely implants to be used.  Models and diagrams were used as educational resources. The patient understands the likely convalescence after surgery, as well as the rehabilitation required.  We thoroughly discussed the risks, benefits, and alternatives to surgery.  The risks include but are not limited to the risk of infection, joint stiffness, blood clots (including DVT and/or pulmonary embolus along with the risk of death), neurologic and/or vascular injury, fracture, dislocation, nonunion, malunion, need for further surgery including hardware failure requiring revision, and continued pain.  It was explained that if tissue has been repaired or reconstructed, there is also a chance of failure which may require further management.  In addition, we have discussed the risks, including the risk of disease transmission, associated with any allograft tissue which may be utilized.  Following the completion of the discussion, the patient expressed understanding of this planned course of  care, all their questions were answered and consent will be obtained preoperatively.

## 2024-11-14 NOTE — ANESTHESIA PREPROCEDURE EVALUATION
Anesthesia Evaluation     Patient summary reviewed and Nursing notes reviewed   history of anesthetic complications:   NPO Solid Status: > 2 hours  NPO Liquid Status: > 8 hours           Airway   Mallampati: III  TM distance: >3 FB  Neck ROM: full  Possible difficult intubation  Dental - normal exam     Pulmonary    (+) ,shortness of breath, sleep apnea on CPAP  Cardiovascular     ECG reviewed  PT is on anticoagulation therapy  Patient on routine beta blocker and Beta blocker given within 24 hours of surgery    (+) hypertension well controlled 2 medications or greater, dysrhythmias Atrial Fib, PVC, hyperlipidemia    ROS comment: Patient last took xarelto Monday night    EKG 11/5 assessment:  1. Permanent atrial fibrillation   2. Ventricular premature beats   3. Primary hypertension   4. Mixed hyperlipidemia      1. Atrial fibrillation, rate controlled, on Xarelto.  Controlled with metoprolol.  2. H/o nonischemic cardiomyopathy due to atrial fibrillation with RVR.  Her LV function was normal on echo 5/2011, normal EF on TTE 8/2024  3. Hypertension, goal <120/80.  Blood pressure now controlled with recent addition of HCTZ 25 mg in the morning move ramipril to nighttime.  In the past, she has sustained hyperkalemia with higher doses of ramipril.  4. DAVID, on CPAP  5. Obesity  6. PVCs.   7. Bilateral knee pain, will need knee replacement.- scheduled for 11/14/2, clearance letter was already provided       ECHO 8/21/24:  Echocardiogram Findings    Left Ventricle Left ventricular systolic function is normal. Left ventricular ejection fraction appears to be 56 - 60%.     Normal left ventricular cavity size noted. Left ventricular wall thickness is consistent with mild concentric hypertrophy. Left ventricular diastolic function was indeterminate. No evidence of left ventricular thrombus or mass present.  Right Ventricle Normal right ventricular cavity size and systolic function noted.  Left Atrium The left atrial cavity is  severely dilated.  Right Atrium The right atrial cavity is mildly dilated.  Aortic Valve No aortic valve regurgitation or stenosis is present. The aortic valve is grossly normal in structure.  Mitral Valve The mitral valve is grossly normal in structure. Mild mitral valve regurgitation is present. No significant mitral valve stenosis is present.  Tricuspid Valve The tricuspid valve is grossly normal in structure. Mild tricuspid valve regurgitation is present. Estimated right ventricular systolic pressure from tricuspid regurgitation is mildly elevated (35-45 mmHg).  Pulmonic Valve The pulmonic valve is not well visualized.  Greater Vessels No dilation of the aortic root is present. The inferior vena cava is normally sized. Partial IVC inspiratory collapse of less than 50% noted.  Pericardium There is no evidence of pericardial effusion. .      Stress test 8/21/24  Nuclear Perfusion Findings    Study Impression Myocardial perfusion imaging indicates a small-sized infarct located in the apex with no significant ischemia noted. Impressions are consistent with a low risk study.  Rest Perfusion Defect 1 There is a small sized defect with moderate reduction in uptake present in the apex.  Stress Perfusion Defect 1 There is a small sized defect of moderate severity present in the apex.  Ventricle Size / Description Left ventricular ejection fraction is mildly reduced (Calculated EF = 42%). Normal LV cavity size. Abnormal LV wall motion of the septal wall. Normal RV cavity size.        Neuro/Psych  (+) numbnessTremors: Numbness/tingling in right fingers.  GI/Hepatic/Renal/Endo    (+) morbid obesity    Musculoskeletal     (+) chronic pain  Abdominal   (+) obese   Substance History      OB/GYN  Preeclampsia assessment negative: Pt states no issues with past anesthesia.        Other   arthritis,   history of cancer remission                      Anesthesia Plan    ASA 3     regional, spinal and MAC       Anesthetic plan,  risks, benefits, and alternatives have been provided, discussed and informed consent has been obtained with: patient.    Use of blood products discussed with patient .        CODE STATUS:

## 2024-11-14 NOTE — PLAN OF CARE
Goal Outcome Evaluation:  Plan of Care Reviewed With: patient           Outcome Evaluation: Physical therapy evaluation complete.  Patient performs supine to sit with min assist and sit to stand with CGA.  Patient performs gait with rolling walker x25 feet, CGA. Patient reports some decreased sensation/coordination in right LE, requires verbal cues for safety with direction changes.  Patient will benefit from physical therapy to address deficits in strength, ROM and functional mobility.  Plan to see x1-2 additional visits, recommend home health PT at discharge.    Anticipated Discharge Disposition (PT): home with home health

## 2024-11-15 VITALS
HEIGHT: 63 IN | RESPIRATION RATE: 18 BRPM | SYSTOLIC BLOOD PRESSURE: 123 MMHG | HEART RATE: 100 BPM | OXYGEN SATURATION: 97 % | DIASTOLIC BLOOD PRESSURE: 65 MMHG | WEIGHT: 263.6 LBS | TEMPERATURE: 98 F | BODY MASS INDEX: 46.71 KG/M2

## 2024-11-15 PROCEDURE — G0378 HOSPITAL OBSERVATION PER HR: HCPCS

## 2024-11-15 PROCEDURE — 97165 OT EVAL LOW COMPLEX 30 MIN: CPT

## 2024-11-15 PROCEDURE — 97110 THERAPEUTIC EXERCISES: CPT

## 2024-11-15 PROCEDURE — 63710000001 OXYCODONE-ACETAMINOPHEN 7.5-325 MG TABLET: Performed by: ORTHOPAEDIC SURGERY

## 2024-11-15 PROCEDURE — 63710000001 FAMOTIDINE 20 MG TABLET: Performed by: ORTHOPAEDIC SURGERY

## 2024-11-15 PROCEDURE — A9270 NON-COVERED ITEM OR SERVICE: HCPCS | Performed by: ORTHOPAEDIC SURGERY

## 2024-11-15 PROCEDURE — 97116 GAIT TRAINING THERAPY: CPT

## 2024-11-15 PROCEDURE — 99213 OFFICE O/P EST LOW 20 MIN: CPT | Performed by: FAMILY MEDICINE

## 2024-11-15 PROCEDURE — 63710000001 METOPROLOL SUCCINATE XL 50 MG TABLET SUSTAINED-RELEASE 24 HOUR: Performed by: ORTHOPAEDIC SURGERY

## 2024-11-15 RX ADMIN — METOPROLOL SUCCINATE 100 MG: 50 TABLET, EXTENDED RELEASE ORAL at 09:06

## 2024-11-15 RX ADMIN — OXYCODONE HYDROCHLORIDE AND ACETAMINOPHEN 1 TABLET: 7.5; 325 TABLET ORAL at 09:06

## 2024-11-15 RX ADMIN — FAMOTIDINE 40 MG: 20 TABLET ORAL at 09:07

## 2024-11-15 NOTE — PROGRESS NOTES
"SERVICE: [facility] HOSPITALIST    CONSULTANTS:    CHIEF COMPLAINT:  consult for HTN    SUBJECTIVE: Patient seen and examined at bedside. She says pain in knee fairly well controlled; denies SOB or chest pain; anxious to get home   OBJECTIVE:    Physical exam is largely unchanged from previous exam, except where documented below, examination is accurate as of 11/15/2024    Physical Exam:  General: Patient is awake and alert. Sitting up in chair; NAD  HENT: Head is atraumatic, normocephalic.  Eyes: Vision is grossly intact. Pupils appear equal and round.   Cardiovascular: RRR, S1-S2  Respiratory: Lungs are clear to ausculation without wheezes, rhonchi or rales.   Abdominal/GI: Soft, nontender, bowel sounds present. No rebound or guarding present.   Extremities: No peripheral edema noted.   Musculoskeletal: right knee bandaged; in ACE  Skin: Warm and dry.   Psych: Mood and affect are appropriate. Cooperative with exam.   Neuro: No facial asymmetry noted. No focal deficits noted, hearing and vision are grossly intact.     /65 (BP Location: Right arm, Patient Position: Lying)   Pulse 100   Temp 98 °F (36.7 °C) (Oral)   Resp 18   Ht 160 cm (63\")   Wt 120 kg (263 lb 9.6 oz)   LMP  (LMP Unknown)   SpO2 97%   BMI 46.69 kg/m²     MEDS/LABS REVIEWED AND ORDERED    famotidine, 40 mg, Oral, Daily  [Held by provider] hydroCHLOROthiazide, 25 mg, Oral, QAM  [Held by provider] lisinopril, 2.5 mg, Oral, Q24H  metoprolol succinate XL, 100 mg, Oral, BID  rivaroxaban, 20 mg, Oral, Daily With Dinner          LAB/DIAGNOSTICS:    Lab Results (last 24 hours)       ** No results found for the last 24 hours. **          No orders to display     Results for orders placed during the hospital encounter of 08/21/24    Adult Transthoracic Echo Complete W/ Cont if Necessary Per Protocol    Interpretation Summary    Left ventricular systolic function is normal. Left ventricular ejection fraction appears to be 56 - " 60%.    Left ventricular wall thickness is consistent with mild concentric hypertrophy.    The following left ventricular wall segments are hypokinetic: apical inferior, mid inferior and basal inferior.    The left atrial cavity is severely dilated.    The right atrial cavity is mildly  dilated.    Estimated right ventricular systolic pressure from tricuspid regurgitation is mildly elevated (35-45 mmHg).    XR Knee 1 or 2 View Right    Result Date: 11/14/2024  Impression: Right knee arthroplasty without evidence of hardware complication. Electronically Signed: Ezio Lanza MD  11/14/2024 9:54 AM EST  Workstation ID: [id number]       ASSESSMENT/PLAN:    HTN  BP better today   Ok to resume  HCTZ/lisinopril today  Continue metoprolol   F/u with PCP outpatient to monitor      PAF  Rate controlled  Continue Metoprolol   Resume Xarelto when ok with Ortho      Hyperlipidemia  Continue statin     Hx of CM  ECHO from 8/24 reviewed; EF 56%  Appears euvolemic      DAVID  Ok to use home CPAP  Order placed      Morbid obesity  Complicates all aspects of care      Right knee OA  S/p right TKA  Continue routine post-op care and pain control per primary team          Patient doing well from medical standpoint.  Ok to d/c if she meets her PT goals; dispo per primary team      Please note portions of this assessment/plan may have been copied and pasted, but I have personally seen this patient and reviewed each line of this assessment and plan for accuracy and made updates to reflect my necessary changes on 11/15/2024        Oliver Sales, DO  11/15/24  08:17 EST    At [facility], we believe that sharing information builds trust and better relationships. You are receiving this note because you recently visited [facility]. It is possible you will see health information before a provider has talked with you about it. This kind of information can be easy to misunderstand. To help you fully understand what it means for your health,  "we urge you to discuss this note with your provider.    \"Dictated utilizing Dragon dictation\"  "

## 2024-11-15 NOTE — THERAPY DISCHARGE NOTE
Acute Care - Physical Therapy Treatment Note/Discharge   Deidra Pimentel     Patient Name: Prema Hawthorne  : 1945  MRN: [id number]  Today's Date: 11/15/2024                Admit Date: 2024    Visit Dx:    ICD-10-CM ICD-9-CM   1. Arthritis of right knee  M17.11 716.96     Patient Active Problem List   Diagnosis    Permanent atrial fibrillation    Hypertension    Morbid obesity    DAVID on CPAP    Ventricular premature beats    Sleep related hypoxia    Hyperlipidemia    Vitamin D deficiency    Post-menopausal bleeding    Arthritis of right knee     Past Medical History:   Diagnosis Date    Arrhythmia 3/2011    Arthritis     Atrial fibrillation     with rapid ventricular response (CHRONIC)    Cancer 2021    Uterine cancer detected    Cardiomyopathy     nonischemic, Dr Mejia follows, clearance in notes    Essential hypertension     Hyperlipidemia     Knee pain, bilateral     GETS INJECTIONS EVERY 3 MONTHS    Morbid obesity     On anticoagulant therapy     xarelto    DAVID (obstructive sleep apnea)     CPAP nightly    PONV (postoperative nausea and vomiting)     Postmenopausal bleeding     PVC (premature ventricular contraction) 2016    Shortness of breath     EXERTION    Systolic heart failure      Past Surgical History:   Procedure Laterality Date    BLADDER SURGERY      CARDIAC CATHETERIZATION  2011    CATARACT EXTRACTION, BILATERAL      COLONOSCOPY      D & C HYSTEROSCOPY N/A 2021    Procedure: HYSTEROSCOPY WITH  DILATATION AND CURETTAGE, POLYPECTOMY WITH MYOSURE;  Surgeon: Christiane Goldman MD;  Location: [facility] OR [facility] [place];  Service: Obstetrics/Gynecology;  Laterality: N/A;    HYSTERECTOMY  2021       PT Assessment (Last 12 Hours)       PT Evaluation and Treatment       Row Name 11/15/24 0900          Physical Therapy Time and Intention    Subjective Information complains of;pain  -JW     Document Type discharge treatment  -JW     Mode of Treatment physical therapy  -JW     Patient Effort  good  -JW     Symptoms Noted During/After Treatment increased pain  -JW       Row Name 11/15/24 0900          General Information    Patient Observations alert;cooperative;agree to therapy  -     Patient/Family/Caregiver Comments/Observations pt sitting in recliner, agrees to therapy  -     Existing Precautions/Restrictions fall  -JW     Benefits Reviewed patient:;improve function;increase independence;increase strength;increase balance  -     Barriers to Rehab none identified  -JW       Row Name 11/15/24 0900          Pain    Pretreatment Pain Rating 2/10  -JW     Posttreatment Pain Rating 4/10  -JW     Pain Location knee  -JW     Pain Side/Orientation right  -JW     Response to Pain Interventions intervention effective per patient report  -       Row Name 11/15/24 0900          Cognition    Personal Safety Interventions gait belt;nonskid shoes/slippers when out of bed  -       Row Name 11/15/24 0900          Bed Mobility    Bed Mobility sit-supine  -     Sit-Supine [place] (Bed Mobility) standby assist  -     Assistive Device (Bed Mobility) bed rails;leg   -       Row Name 11/15/24 0900          Transfers    Transfers sit-stand transfer;stand-sit transfer  -       Row Name 11/15/24 0900          Sit-Stand Transfer    Sit-Stand [place] (Transfers) standby assist  -     Assistive Device (Sit-Stand Transfers) walker, front-wheeled  -       Row Name 11/15/24 0900          Stand-Sit Transfer    Stand-Sit [place] (Transfers) standby assist  -     Assistive Device (Stand-Sit Transfers) walker, front-wheeled  -       Row Name 11/15/24 0900          Gait/Stairs (Locomotion)    [place] Level (Gait) standby assist  -     Assistive Device (Gait) walker, front-wheeled  -     Distance in Feet (Gait) 150  -JW     Pattern (Gait) swing-through  -JW     Deviations/Abnormal Patterns (Gait) gait speed decreased  -     Bilateral Gait Deviations forward flexed posture  -      Comment, (Gait/Stairs) pt manages direction changes without difficulty or balance loss  -JW       Row Name 11/15/24 0900          Balance    Comment, Balance SBA for standing balance with walker  -JW       Row Name 11/15/24 0900          Motor Skills    Therapeutic Exercise --  pt performs 10 reps of LE HEP, written handout provided  -JW       Row Name             Wound 11/14/24 0759 Right anterior knee    Wound - Properties Group Placement Date: 11/14/24  -CM Placement Time: 0759  -CM Side: Right  -CM Orientation: anterior  -CM Location: knee  -CM Primary Wound Type: Incision  -CM Additional Comments: SUSU, Ace, Ice  -CM    Retired Wound - Properties Group Placement Date: 11/14/24  -CM Placement Time: 0759  -CM Side: Right  -CM Orientation: anterior  -CM Location: knee  -CM Primary Wound Type: Incision  -CM Additional Comments: SUSU, Ace, Ice  -CM    Retired Wound - Properties Group Placement Date: 11/14/24  -CM Placement Time: 0759  -CM Side: Right  -CM Orientation: anterior  -CM Location: knee  -CM Primary Wound Type: Incision  -CM Additional Comments: SUSU, Ace, Ice  -CM    Retired Wound - Properties Group Date first assessed: 11/14/24  -CM Time first assessed: 0759  -CM Side: Right  -CM Location: knee  -CM Primary Wound Type: Incision  -CM Additional Comments: SUSU, Ace, Ice  -CM      Row Name 11/15/24 0900          Plan of Care Review    Outcome Evaluation PT: Patient performs sit to stand with SBA and gait with rolling walker x150 feet, SBA .  Patient manages direction changes without difficulty or balance loss.  Patient performs 10 reps of LE HEP, written handout provided.  Patient reports no concerns regarding return home at this time, anticipate home health PT.  No further PT needs in acute care setting.  -ALDEN       Row Name 11/15/24 0900          Positioning and Restraints    Pre-Treatment Position sitting in chair/recliner  -JW     Post Treatment Position bed  -JW     In Bed supine;call light within  reach;encouraged to call for assist  -       Row Name 11/15/24 0900          Progress Summary (PT)    Progress Toward Functional Goals (PT) progress toward functional goals is good;prepare for discharge  -               User Key  (r) = Recorded By, (t) = Taken By, (c) = Cosigned By      Initials Name Provider Type    Ivis Ferrera, PT Physical Therapist    Rose Marie Fletcher RN Registered Nurse                      Physical Therapy Education       Title: PT OT SLP Therapies (Resolved)       Topic: Physical Therapy (Resolved)       Point: Mobility training (Resolved)       Learning Progress Summary            Patient Acceptance, E,TB, VU by  at 11/15/2024 0956    Acceptance, E,TB, VU by  at 11/14/2024 1353                      Point: Home exercise program (Resolved)       Learning Progress Summary            Patient Acceptance, E,TB, VU by  at 11/15/2024 0956                                      User Key       Initials Effective Dates Name Provider Type [facility] 06/16/21 -  Ivis Sharp, PT Physical Therapist PT                    PT Recommendation and Plan  Anticipated Discharge Disposition (PT): home with home health  Planned Therapy Interventions (PT): balance training, bed mobility training, gait training, home exercise program, patient/family education, strengthening, transfer training  Therapy Frequency (PT): other (see comments) (1-2 visits)  Progress Summary (PT)  Progress Toward Functional Goals (PT): progress toward functional goals is good, prepare for discharge  Outcome Evaluation: PT: Patient performs sit to stand with SBA and gait with rolling walker x150 feet, SBA .  Patient manages direction changes without difficulty or balance loss.  Patient performs 10 reps of LE HEP, written handout provided.  Patient reports no concerns regarding return home at this time, anticipate home health PT.  No further PT needs in acute care setting.     Outcome Measures       Row Name 11/15/24  0900             How much help from another person do you currently need...    Turning from your back to your side while in flat bed without using bedrails? 4  -JW      Moving from lying on back to sitting on the side of a flat bed without bedrails? 3  -JW      Moving to and from a bed to a chair (including a wheelchair)? 3  -JW      Standing up from a chair using your arms (e.g., wheelchair, bedside chair)? 3  -JW      Climbing 3-5 steps with a railing? 3  -JW         PADD    Diagnosis 1  -JW      Gender 1  -JW      Age Group 0  -JW      Gait Distance 1  -JW      Assist Level 2  -JW      Home Support 3  -JW      PADD Score 8  -JW      Patient Preference home with home health  -JW      Prediction by PADD Score directly home (with home health or out-patient rehab)  -         Functional Assessment    Outcome Measure Options AM-PAC 6 Clicks Basic Mobility (PT);PADD  -JW                User Key  (r) = Recorded By, (t) = Taken By, (c) = Cosigned By      Initials Name Provider Type    Ivis Ferrera PT Physical Therapist                     Time Calculation:    PT Charges       Row Name 11/15/24 0957             Time Calculation    Start Time 0900  -JW      Stop Time 0925  -JW      Time Calculation (min) 25 min  -JW      PT Received On 11/15/24  -JW         Timed Charges    03016 - PT Therapeutic Exercise Minutes 10  -JW      50406 - Gait Training Minutes  15  -JW         Total Minutes    Timed Charges Total Minutes 25  -JW       Total Minutes 25  -JW                User Key  (r) = Recorded By, (t) = Taken By, (c) = Cosigned By      Initials Name Provider Type    Ivis Ferrera PT Physical Therapist                  Therapy Charges for Today       Code Description Service Date Service Provider Modifiers Qty    21424424592  PT EVAL LOW COMPLEXITY 1 11/14/2024 Ivis Sharp, PT GP 1    33453912190  PT THER PROC EA 15 MIN 11/15/2024 Ivis Sharp, PT GP 1    [id number]  GAIT TRAINING EA 15 MIN  11/15/2024 Ivis Sharp, PT GP 1            PT G-Codes  Outcome Measure Options: AM-PAC 6 Clicks Daily Activity (OT)  AM-PAC 6 Clicks Score (PT): 18  AM-PAC 6 Clicks Score (OT): 21    PT Discharge Summary  Anticipated Discharge Disposition (PT): home with home health    Ivis Sharp, PT  11/15/2024

## 2024-11-15 NOTE — CASE MANAGEMENT/SOCIAL WORK
Continued Stay Note  BREONNA Israel     Patient Name: Prema Hawthorne  MRN: [id number]  Today's Date: 11/15/2024    Admit Date: 11/14/2024    Plan: plan home/[facility]    Discharge Plan       Row Name 11/15/24 1129       Plan    Plan plan home/[facility]    Patient/Family in Agreement with Plan yes    Plan Comments Follow up visit with patient at bedside. She verifies she has all needed DME. Her  can assist as needed and [facility] is scheduled for in home PT. No additional needs noted. Anticipate home this afternoon.                   Discharge Codes    No documentation.                 Expected Discharge Date and Time       Expected Discharge Date Expected Discharge Time    Nov 15, 2024               Dre Hobbs RN

## 2024-11-15 NOTE — THERAPY DISCHARGE NOTE
Acute Care - Occupational Therapy Discharge   Deidra Pimentel    Patient Name: Prema Hawthorne  : 1945    MRN: [id number]                              Today's Date: 11/15/2024       Admit Date: 2024    Visit Dx:     ICD-10-CM ICD-9-CM   1. Arthritis of right knee  M17.11 716.96     Patient Active Problem List   Diagnosis    Permanent atrial fibrillation    Hypertension    Morbid obesity    DAVID on CPAP    Ventricular premature beats    Sleep related hypoxia    Hyperlipidemia    Vitamin D deficiency    Post-menopausal bleeding    Arthritis of right knee     Past Medical History:   Diagnosis Date    Arrhythmia 3/2011    Arthritis     Atrial fibrillation     with rapid ventricular response (CHRONIC)    Cancer 2021    Uterine cancer detected    Cardiomyopathy     nonischemic, Dr Mejia follows, clearance in notes    Essential hypertension     Hyperlipidemia     Knee pain, bilateral     GETS INJECTIONS EVERY 3 MONTHS    Morbid obesity     On anticoagulant therapy     xarelto    DAVID (obstructive sleep apnea)     CPAP nightly    PONV (postoperative nausea and vomiting)     Postmenopausal bleeding     PVC (premature ventricular contraction) 2016    Shortness of breath     EXERTION    Systolic heart failure      Past Surgical History:   Procedure Laterality Date    BLADDER SURGERY      CARDIAC CATHETERIZATION  2011    CATARACT EXTRACTION, BILATERAL      COLONOSCOPY      D & C HYSTEROSCOPY N/A 2021    Procedure: HYSTEROSCOPY WITH  DILATATION AND CURETTAGE, POLYPECTOMY WITH MYOSURE;  Surgeon: Christiane Goldman MD;  Location: [facility] [place] [facility] [place];  Service: Obstetrics/Gynecology;  Laterality: N/A;    HYSTERECTOMY  2021      General Information       Row Name 11/15/24 0913          OT Time and Intention    Subjective Information no complaints  -SD     Document Type discharge evaluation/summary  -SD     Mode of Treatment occupational therapy  -SD     Total Minutes, Occupational Therapy 16  -SD      Patient Effort adequate  -SD     Symptoms Noted During/After Treatment none  -SD       Row Name 11/15/24 0913          General Information    Patient Profile Reviewed yes  pt s/p right TKA, WBAT  -SD     Prior Level of Function independent:;ADL's;all household mobility  Pt using a rolling walker at home.  -SD     Existing Precautions/Restrictions fall  -SD     Barriers to Rehab none identified  -SD       Row Name 11/15/24 0913          Occupational Profile    Reason for Services/Referral (Occupational Profile) decreased adl's s/p TKA  -SD     Successful Occupations (Occupational Profile) I with daily tasks.  -SD     Occupational History/Life Experiences (Occupational Profile) Pt lives with her spouse. She states he has dementia, and she provides supervision/supprort for her  as needed for daily tasks.  -SD     Environmental Supports and Barriers (Occupational Profile) Pt indicates spouse will be able to provide some support at home  -SD     Patient Goals (Occupational Profile) go home  -SD       Row Name 11/15/24 0913          Living Environment    People in Home spouse  -SD       Row Name 11/15/24 0913          Home Main Entrance    Number of Stairs, Main Entrance none  -SD       Row Name 11/15/24 0913          Stairs Within Home, Primary    Stairs, Within Home, Primary pt lives in a patio home  -SD       Row Name 11/15/24 0913          Cognition    Orientation Status (Cognition) oriented x 3  -SD               User Key  (r) = Recorded By, (t) = Taken By, (c) = Cosigned By      Initials Name Provider Type    SD Ritesh Gonzales OTR Occupational Therapist                   Mobility/ADL's       Row Name 11/15/24 0916          Activities of Daily Living    BADL Assessment/Intervention bathing;lower body dressing  -SD       Row Name 11/15/24 0916          Mobility    Extremity Weight-bearing Status right lower extremity  -SD     Right Lower Extremity (Weight-bearing Status) weight-bearing as tolerated (WBAT)   -SD       Row Name 11/15/24 0916          Bathing Assessment/Intervention    Comment, (Bathing) Education provided regarding compensatory strategies for LB adl tasks s/p TKA. Pt has a long handles sponge at home. She has grab bars, handheld shower and shower seat at home. Pt does not like the set up of her bathroom for tub/shower transfers. Pt advised to consult with HH therapist before attempting transfer into tub/shower. Pt states she can perform a sinkside bath for safety if needed until consultation with HH therapy.  -SD       Row Name 11/15/24 0916          Lower Body Dressing Assessment/Training    Comment, (Lower Body Dressing) Education provided regarding compensatory strategies for LB adl tasks s/p TKA. Pt has a sock aide and LH reacher to use as needed for lb dressing. Verbal instruction provided regarding use of LH reacher for donning lb clothing. pt verbalized understanding.  -SD               User Key  (r) = Recorded By, (t) = Taken By, (c) = Cosigned By      Initials Name Provider Type    Ritesh Ledezma, EMILY Occupational Therapist                   Obj/Interventions       Row Name 11/15/24 0920          Sensory Assessment (Somatosensory)    Sensory Assessment (Somatosensory) sensation intact  -SD               User Key  (r) = Recorded By, (t) = Taken By, (c) = Cosigned By      Initials Name Provider Type    Ritesh Ledezma, OTR Occupational Therapist                   Goals/Plan    No documentation.                  Clinical Impression       Row Name 11/15/24 0921          Pain Assessment    Pretreatment Pain Rating 0/10 - no pain  -SD     Posttreatment Pain Rating 0/10 - no pain  -SD       Row Name 11/15/24 0921          Plan of Care Review    Plan of Care Reviewed With patient  -SD     Outcome Evaluation Occupational therapy evaluation completed. Pt s/p right TKA. Education provided regarding compensatory strategies/use of LH adaptive equipment to increase independence/safety with daily  tasks. Pt has access to a long handled reacher, sock aide and long handled sponge at home. Verbal instruction provided regarding use of the AE for LB adl management. Plan is for discharge to home with  services.  -SD       Row Name 11/15/24 0921          Therapy Assessment/Plan (OT)    Patient/Family Therapy Goal Statement (OT) go home  -SD     Criteria for Skilled Therapeutic Interventions Met (OT) other (see comments)  Education provided at time of evaluation. No further OT services rec in acute setting.  -SD     Therapy Frequency (OT) evaluation only  -SD       Row Name 11/15/24 0921          Therapy Plan Review/Discharge Plan (OT)    Anticipated Discharge Disposition (OT) home with home health  -SD       Row Name 11/15/24 0921          Positioning and Restraints    Pre-Treatment Position sitting in chair/recliner  -SD     Post Treatment Position chair  -SD     In Chair reclined;call light within reach;encouraged to call for assist  -SD               User Key  (r) = Recorded By, (t) = Taken By, (c) = Cosigned By      Initials Name Provider Type    Ritesh Ledezma OTR Occupational Therapist                   Outcome Measures       Row Name 11/15/24 0926          How much help from another is currently needed...    Putting on and taking off regular lower body clothing? 3  -SD     Bathing (including washing, rinsing, and drying) 3  -SD     Toileting (which includes using toilet bed pan or urinal) 3  -SD     Putting on and taking off regular upper body clothing 4  -SD     Taking care of personal grooming (such as brushing teeth) 4  -SD     Eating meals 4  -SD     AM-PAC 6 Clicks Score (OT) 21  -SD       Row Name 11/15/24 0926          Functional Assessment    Outcome Measure Options AM-PAC 6 Clicks Daily Activity (OT)  -SD               User Key  (r) = Recorded By, (t) = Taken By, (c) = Cosigned By      Initials Name Provider Type    Ritesh Ledezma OTR Occupational Therapist                   Occupational Therapy Education       Title: PT OT SLP Therapies (In Progress)       Topic: Occupational Therapy (Resolved)       Point: ADL training (Resolved)       Description:   Instruct learner(s) on proper safety adaptation and remediation techniques during self care or transfers.   Instruct in proper use of assistive devices.                  Learning Progress Summary            Patient Acceptance, E, VU by SD at 11/15/2024 0926    Comment: Education regarding OT services, impact of TKA on daily tasks and compensatory strategies/use of AE for management of adl's.                                      User Key       Initials Effective Dates Name Provider Type Discipline    SD 06/16/21 -  Ritesh Gonzales OTR Occupational Therapist OT                  OT Recommendation and Plan  Therapy Frequency (OT): evaluation only  Plan of Care Review  Plan of Care Reviewed With: patient  Outcome Evaluation: Occupational therapy evaluation completed. Pt s/p right TKA. Education provided regarding compensatory strategies/use of LH adaptive equipment to increase independence/safety with daily tasks. Pt has access to a long handled reacher, sock aide and long handled sponge at home. Verbal instruction provided regarding use of the AE for LB adl management. Plan is for discharge to home with  services.       Time Calculation:   Evaluation Complexity (OT)  Review Occupational Profile/Medical/Therapy History Complexity: brief/low complexity  Assessment, Occupational Performance/Identification of Deficit Complexity: 1-3 performance deficits  Clinical Decision Making Complexity (OT): problem focused assessment/low complexity  Overall Complexity of Evaluation (OT): low complexity     Time Calculation- OT       Row Name 11/15/24 0928             Time Calculation- OT    OT Start Time 0816  -SD      OT Stop Time 0832  -SD      OT Time Calculation (min) 16 min  -SD         Untimed Charges    OT Eval/Re-eval Minutes 16  -SD          Total Minutes    Untimed Charges Total Minutes 16  -SD       Total Minutes 16  -SD                User Key  (r) = Recorded By, (t) = Taken By, (c) = Cosigned By      Initials Name Provider Type    Ritesh Ledezma OTR Occupational Therapist                  Therapy Charges for Today       Code Description Service Date Service Provider Modifiers Qty    60492061698 HC OT EVAL LOW COMPLEXITY 1 11/15/2024 Ritesh Gonzales OTR GO 1               OT Discharge Summary  Anticipated Discharge Disposition (OT): home with home health  Reason for Discharge: other (comment), Discharge from facility (Pt has no discharge concerns)  Outcomes Achieved: Discharge from facility occurred on same date as evluation    EMILY Ochoa  11/15/2024

## 2024-11-15 NOTE — DISCHARGE SUMMARY
Outpatient Surgery Discharge Summary    Patient: Prema Hawthorne  Medical Record #: [id number]  Age: 79 y.o.  : 1945    Admit date: 2024    Discharge date: 11/15/2024    Attending physician: Kadeem Hendricks MD    Admission diagnosis: Osteoarthritis of right knee [M17.11]  Arthritis of right knee [M17.11]    Discharge diagnosis: Osteoarthritis of right knee [M17.11]  Arthritis of right knee [M17.11]    Procedure performed:Right Total Knee Arthroplasty    Hospital Course:  Patient was admitted.  Underwent appropriate preoperative testing.  Underwent regional anesthesia and did well in the operating room and throughout the recovery period.   Discharged home in good condition with PT to start soon.     Admission condition: good    Discharge condition: good    Disposition: home    Meds: medicines reconciled and prescriptions signed on chart    Activity: see discharge instruction sheet  Diet: see discharge instruction sheet  Wound Care: see discharge instruction sheet    Follow up: Dr. Hendricks in 2-3 weeks    Signed:  CARRIE Lazo  11/15/2024  08:45 EST

## 2024-11-15 NOTE — PLAN OF CARE
Goal Outcome Evaluation:              Outcome Evaluation: VSS. POD #! from a right TKR. Dressing remains C/D/I. SUSU in place and funtioning. Walks to bathroom with use of walker and one persopn standby assist. Medicated for  pain with good effect.

## 2024-11-15 NOTE — PROGRESS NOTES
"Progress Note    Patient: Prema Hawthorne  Medical record #: [id number]    Prema Hawthorne is a 79 y.o.  female who is POD #1 right TKA.  The patient's main complaint today is surgical pain at the operative site.    Hospital Problem List:    Arthritis of right knee      Current Medications:  Scheduled Meds:famotidine, 40 mg, Oral, Daily  [Held by provider] hydroCHLOROthiazide, 25 mg, Oral, QAM  [Held by provider] lisinopril, 2.5 mg, Oral, Q24H  metoprolol succinate XL, 100 mg, Oral, BID  rivaroxaban, 20 mg, Oral, Daily With Dinner      Continuous Infusions:   PRN Meds:.  acetaminophen    HYDROmorphone **AND** naloxone    melatonin    Morphine **AND** naloxone    ondansetron ODT **OR** ondansetron    oxyCODONE-acetaminophen    oxyCODONE-acetaminophen    traMADol    Input/Output:    Intake/Output Summary (Last 24 hours) at 11/15/2024 0837  Last data filed at 11/14/2024 1750  Gross per 24 hour   Intake 1360 ml   Output 50 ml   Net 1310 ml       Vital signs:  Blood pressure 123/65, pulse 100, temperature 98 °F (36.7 °C), temperature source Oral, resp. rate 18, height 160 cm (63\"), weight 120 kg (263 lb 9.6 oz), SpO2 97%, not currently breastfeeding.    Physical Exam:     Right Knee:  Dressing: clean and dry  Able to dorsiflex ankle and move toes  Sensation grossly intact to light touch throughout  Distal pulses intact  No calf swelling  Roslyn's sign negative  Compartments soft  No signs or symptoms of DVT or compartment syndrome    Labs:   [unfilled]    Assessment: 79 y.o.  female POD #1 Left Right    Plan:  Hgb - stable  mobilize with PT, WBAT on operative knee with walker   pain controlled   ASA 81 mg BID for 6 weeks according to CHEST guidelines   Disposition: plan discharge home this am   SUSU wound vac will remain on for 2 weeks  Ok to shower with dressing in place.   Appreciate medical team's expertise and help in managing the patient's medical co-morbidities    Followed by [facility] for medical " conditions to include   Arthritis of right knee        Signed:  CARRIE Lazo  11/15/2024  08:37 EST

## 2024-11-15 NOTE — PLAN OF CARE
Problem: Adult Inpatient Plan of Care  Goal: Plan of Care Review  Recent Flowsheet Documentation  Taken 11/15/2024 0921 by Ritesh Gonzales OTR  Outcome Evaluation: Occupational therapy evaluation completed. Pt s/p right TKA. Education provided regarding compensatory strategies/use of LH adaptive equipment to increase independence/safety with daily tasks. Pt has access to a long handled reacher, sock aide and long handled sponge at home. Verbal instruction provided regarding use of the AE for LB adl management. Plan is for discharge to home with  services.

## 2024-11-15 NOTE — CASE MANAGEMENT/SOCIAL WORK
Case Management Discharge Note      Final Note: dc home/[facility]         Selected Continued Care - Discharged on 11/15/2024 Admission date: 11/14/2024 - Discharge disposition: Home or Self Care      Destination    No services have been selected for the patient.                Durable Medical Equipment    No services have been selected for the patient.                Dialysis/Infusion    No services have been selected for the patient.                Home Medical Care       Service Provider Services Address Phone Fax Patient Preferred    [facility] [address] [place] [id number] [phone] --              Therapy    No services have been selected for the patient.                Community Resources    No services have been selected for the patient.                Community & DME    No services have been selected for the patient.                         Final Discharge Disposition Code: 06 - home with home health care

## 2024-11-15 NOTE — PROGRESS NOTES
Patient: Prema Hawthorne  Procedure(s):  TOTAL KNEE ARTHROPLASTY  Anesthesia type: regional, spinal, MAC    Patient location: [facility] Surgical Floor  Vitals:    11/14/24 2030 11/15/24 0005 11/15/24 0358 11/15/24 0730   BP:  106/58 111/76 123/65   BP Location:  Right arm Right arm Right arm   Patient Position:  Lying Lying Lying   Pulse: 83 69 73 100   Resp: 17 18 17 18   Temp:   98 °F (36.7 °C)    TempSrc:   Oral Oral   SpO2: 94% 96% 98% 97%   Weight:       Height:         Level of consciousness: awake, alert, and oriented    Post-anesthesia pain: adequate analgesia  Airway patency: patent  Respiratory: unassisted  Cardiovascular: stable and blood pressure at baseline  Hydration: euvolemic    Anesthetic complications: no

## 2024-11-15 NOTE — PLAN OF CARE
Goal Outcome Evaluation:  Plan of Care Reviewed With: patient           Outcome Evaluation: PT: Patient performs sit to stand with SBA and gait with rolling walker x150 feet, SBA .  Patient manages direction changes without difficulty or balance loss.  Patient performs 10 reps of LE HEP, written handout provided.  Patient reports no concerns regarding return home at this time, anticipate home health PT.  No further PT needs in acute care setting.    Anticipated Discharge Disposition (PT): home with home health

## 2024-11-16 ENCOUNTER — READMISSION MANAGEMENT (OUTPATIENT)
Dept: CALL CENTER | Facility: HOSPITAL | Age: 79
End: 2024-11-16
Payer: MEDICARE

## 2024-11-16 NOTE — OUTREACH NOTE
Prep Survey      Flowsheet Row Responses   [facility] facility patient discharged from? [place]   Is LACE score < 7 ? Yes   Eligibility Readm Mgmt   Discharge diagnosis TOTAL KNEE ARTHROPLASTY   Does the patient have one of the following disease processes/diagnoses(primary or secondary)? Total Joint Replacement   Does the patient have Home health ordered? Yes   What is the Home health agency?  [facility]   Prep survey completed? Yes            Crystal RIVAS - Registered Nurse

## 2025-03-07 ENCOUNTER — TELEPHONE (OUTPATIENT)
Dept: CARDIOLOGY | Facility: CLINIC | Age: 80
End: 2025-03-07
Payer: MEDICARE

## 2025-03-11 NOTE — TELEPHONE ENCOUNTER
CARDIOLOGY    Patient Name: Prema Hawthorne  :1945  Age: 79 y.o.    03/10/25    To whom it may concern:    Prema Hawthorne was referred to my office for cardiovascular risk assessment exam for upcoming hip arthroplasty.    From a cardiovascular standpoint, the patient is at the following risk of major cardiovascular event in the jani-operative setting:  [] Low         [] Modifiable  [] Low-Moderate       [x] Non-Modifiable   [x] Moderate  [] Moderare - high  [] High    Cardiac Testing:  [] Needs further cardiac testing  [x] Does not need further cardiac testing    Anticoagulant Status:  [] No anticoagulants    [] The patient is on  [] ASA; hold for ___ days.  [] Coumadin; hold for ___ days.  [] Plavix; hold for ___ days.  [] Eliquis; hold for ___ days.  [] Brilinta; hold for ___ days.  [x] Xarelto; hold for _3__ days.  [] Effient; hold for ___ days.      [] The patient requires Lovenox bridging, which has been prescribed.     Beta Blockers:  [] The patient will need pre-op beta blockers which will be prescribed by my clinic.    If there are any problems during surgery, please do not hesitate to contact my office.    Thank you for allowing me to participate in this patient's care.    Sincerely,         MARCELINO Zeng  [facility]   [facility]  [address] [place] [address]  Office: [phone]

## 2025-05-06 ENCOUNTER — OFFICE VISIT (OUTPATIENT)
Dept: CARDIOLOGY | Facility: CLINIC | Age: 80
End: 2025-05-06
Payer: MEDICARE

## 2025-05-06 VITALS
SYSTOLIC BLOOD PRESSURE: 142 MMHG | HEIGHT: 63 IN | DIASTOLIC BLOOD PRESSURE: 86 MMHG | BODY MASS INDEX: 46.25 KG/M2 | WEIGHT: 261 LBS | HEART RATE: 82 BPM

## 2025-05-06 DIAGNOSIS — I48.21 PERMANENT ATRIAL FIBRILLATION: Primary | ICD-10-CM

## 2025-05-06 DIAGNOSIS — E78.2 MIXED HYPERLIPIDEMIA: ICD-10-CM

## 2025-05-06 DIAGNOSIS — I49.3 VENTRICULAR PREMATURE BEATS: ICD-10-CM

## 2025-05-06 DIAGNOSIS — I10 PRIMARY HYPERTENSION: ICD-10-CM

## 2025-05-06 DIAGNOSIS — G47.33 OSA ON CPAP: ICD-10-CM

## 2025-05-06 PROCEDURE — 3079F DIAST BP 80-89 MM HG: CPT | Performed by: INTERNAL MEDICINE

## 2025-05-06 PROCEDURE — 3077F SYST BP >= 140 MM HG: CPT | Performed by: INTERNAL MEDICINE

## 2025-05-06 PROCEDURE — 93000 ELECTROCARDIOGRAM COMPLETE: CPT | Performed by: INTERNAL MEDICINE

## 2025-05-06 PROCEDURE — 99214 OFFICE O/P EST MOD 30 MIN: CPT | Performed by: INTERNAL MEDICINE

## 2025-05-06 NOTE — PROGRESS NOTES
Date of Office Visit: 2024  Encounter Provider: Brit Mejia MD  Place of Service: [facility] CARDIOLOGY  Patient Name: Prema Hawthorne  :1945      Patient ID:  Prema Hawthorne is a 79 y.o. female is here for  followup for atrial fibrillation.        History of Present Illness    She has a history of atrial fibrillation, hypertension, obesity, DAVID.    She came to [facility] in 2011. She came in with complaints of shortness of breath and fatigue. She was found to be in atrial fibrillation with a rapid ventricular response. An echocardiogram revealed an ejection fraction of 35%. She was admitted to the hospital and had a transesophageal echocardiogram and cardioversion. She came to our office for a stress test and was back in atrial fibrillation with rapid ventricular response. Heart catheterization in  was performed which showed a normal left main, normal left anterior descending artery, normal circumflex. The right coronary artery was codominant without significant disease. She was started on amiodarone at that time. She spontaneously cardioverted back to sinus rhythm. Repeat echo in May of 2011 showed that her LV function had returned to normal at 67%, and there was no significant valvular disease.       She does not know when she is in atrial fibrillation. She does have obstructive sleep apnea and follows with Dr. Tolentino. Her sleep apnea is well controlled.      She had a nonischemic stress PET study done 3/19/2019.  She had an echocardiogram done 2019 showing ejection fraction 51-55% with moderate septal hypokinesis, mild concentric left ventricle hypertrophy, severe left atrial enlargement, moderate to severe right atrial enlargement.     Her  has dementia and she is his caregiver.    Labs done 10/24/2024 show hemoglobin A1c 5.8% with normal BMP, normal CBC.  She had a stress nuclear perfusion study done 2024 showing small apical infarct  without ischemia.  Echocardiogram done 8/20/2024 showed ejection fraction 56-60% with mild concentric left ventricular hypertrophy, severe left atrial enlargement, mild right atrial enlargement, normal RVSP, inferior wall hypokinesis.She had right TKA done 11/14/2024.    Labs on 2/13/2025 showed triglycerides 112, total cholesterol 230, HDL 53, , VLDL 22, potassium 5.3, glucose 102 with otherwise normal CMP, normal CBC, normal vitamin D level.    She has not has chest pain or pressure.  She does not feel her heart racing or skipping.  She is scheduled to have her left knee replaced with Dr. Brizuela on 7/20/2024.  She has had no syncope or falls.  She cares for her  who has underlying dementia.  She did have [employer] last night which is probably why her blood pressure is up.  Normally they do not eat out.  She has no orthopnea or PND but does not sleep well due to joint pain.  She is probably can require a repeat sleep study per Dr. Tolentino.  She does have mild exertional dyspnea but is unchanged.  She is currently walking with a cane.      Past Medical History:   Diagnosis Date    Arrhythmia 3/2011    Arthritis     Atrial fibrillation     with rapid ventricular response (CHRONIC)    Cancer 03/2021    Uterine cancer detected    Cardiomyopathy     nonischemic, Dr Mejia follows, clearance in notes    Essential hypertension     Hyperlipidemia     Knee pain, bilateral     GETS INJECTIONS EVERY 3 MONTHS    Morbid obesity     On anticoagulant therapy     xarelto    DAVID (obstructive sleep apnea) 2011    CPAP nightly    PONV (postoperative nausea and vomiting)     Postmenopausal bleeding     PVC (premature ventricular contraction) 03/25/2016    Shortness of breath     EXERTION    Systolic heart failure          Past Surgical History:   Procedure Laterality Date    BLADDER SURGERY      CARDIAC CATHETERIZATION  03/2011    CATARACT EXTRACTION, BILATERAL      COLONOSCOPY      D & C HYSTEROSCOPY N/A 03/24/2021     Procedure: HYSTEROSCOPY WITH  DILATATION AND CURETTAGE, POLYPECTOMY WITH MYOSURE;  Surgeon: Christiane Goldman MD;  [facility];  Service: Obstetrics/Gynecology;  Laterality: N/A;    HYSTERECTOMY  05/2021    TOTAL KNEE ARTHROPLASTY Right 11/14/2024    Procedure: TOTAL KNEE ARTHROPLASTY;  Surgeon: Kadeem Hendricks MD;  Location: [facility];  Service: Orthopedics;  Laterality: Right;       Current Outpatient Medications on File Prior to Visit   Medication Sig Dispense Refill    doxycycline (VIBRAMYCIN) 100 MG capsule Take 1 capsule by mouth 2 (Two) Times a Day. 14 capsule 0    hydroCHLOROthiazide 25 MG tablet Take 1 tablet by mouth Every Morning. 90 tablet 3    metoprolol succinate XL (TOPROL-XL) 100 MG 24 hr tablet TAKE 1 TABLET BY MOUTH TWICE  DAILY 180 tablet 3    ondansetron (Zofran) 4 MG tablet Take 1 tablet by mouth Every 8 (Eight) Hours As Needed for Nausea or Vomiting. 30 tablet 0    oxyCODONE-acetaminophen (PERCOCET) 7.5-325 MG per tablet Take 1 tablet by mouth Every 4 (Four) Hours As Needed for Moderate Pain. 40 tablet 0    ramipril (ALTACE) 2.5 MG capsule Take 1 capsule by mouth Every Night. 90 capsule 3    Tranexamic Acid 650 MG tablet Take 1 tablet by mouth 2 (Two) Times a Day. 14 tablet 0    Turmeric-Fish Oil 125-600 MG capsule delayed-release Take 2 capsules by mouth Daily.      Ubiquinol 100 MG capsule Take 1 capsule by mouth Daily.      Unable to find Take 1 each by mouth Daily. Med Name: Collagen + multi powder      Vitamin A-Vitamin D-Minerals (Super D3 Complex) capsule Take 2 capsules by mouth Daily.      Xarelto 20 MG tablet TAKE 1 TABLET BY MOUTH DAILY  WITH DINNER 90 tablet 3     No current facility-administered medications on file prior to visit.       Social History     Socioeconomic History    Marital status:    Tobacco Use    Smoking status: Never     Passive exposure: Never    Smokeless tobacco: Never   Vaping Use    Vaping status: Never Used   Substance and Sexual  "Activity    Alcohol use: Yes     Comment: occasionally / wine    Drug use: No     Comment: caffeine use     Sexual activity: Not Currently     Partners: Male             Procedures    ECG 12 Lead    Date/Time: 5/6/2025 1:05 PM  Performed by: Brit Mejia MD    Authorized by: Brit Mejia MD  Comparison: compared with previous ECG   Similar to previous ECG  Rhythm: atrial fibrillation  Ectopy: unifocal PVCs    Clinical impression: abnormal EKG              Objective:      Vitals:    05/06/25 1251   BP: 142/86   Pulse: 82   Weight: 118 kg (261 lb)   Height: 160 cm (63\")     Body mass index is 46.23 kg/m².    Vitals reviewed.   Constitutional:       General: Not in acute distress.     Appearance: Morbidly obese. Not diaphoretic.   Neck:      Vascular: No carotid bruit or JVD.   Pulmonary:      Effort: Pulmonary effort is normal.      Breath sounds: Normal breath sounds.   Cardiovascular:      Normal rate. Irregularly irregular rhythm.      Murmurs: There is no murmur.      No gallop.  No rub.   Pulses:     Intact distal pulses.      Carotid: 2+ bilaterally.     Radial: 2+ bilaterally.     Dorsalis pedis: 2+ bilaterally.     Posterior tibial: 2+ bilaterally.  Edema:     Peripheral edema present.     Ankle: bilateral 1+ edema of the ankle.  Neurological:      Cranial Nerves: No cranial nerve deficit.         Lab Review:       Assessment:      Diagnosis Plan   1. Permanent atrial fibrillation        2. Ventricular premature beats        3. Primary hypertension        4. DAVID on CPAP        5. Mixed hyperlipidemia                 Atrial fibrillation, rate controlled, on Xarelto.  Controlled with metoprolol.  H/o nonischemic cardiomyopathy due to atrial fibrillation with RVR.  Her LV function was normal on echo 5/2011  Hypertension, goal <120/80.  Remain on HCTZ 25 mg in the morning move ramipril to nighttime.  In the past, she has sustained hyperkalemia with higher doses of ramipril.  She says that at " home, her blood pressure has been well-controlled.  DAVID, on CPAP, follows with Dr. Tolentino.  Is to have repeat sleep study to see why she is not sleeping well.  Obesity  PVCs.   Status post right knee replacement done 11/2024.  Is to have left knee replacement done 7/2024.  She is a low*stable cardiovascular risk to proceed with this.    Plan:       See Michelle in 6 months, no medication changes, overall stable.  I think her blood pressure will be better when she has her knee replaced as she has a lot of pain with his knee.

## 2025-05-20 RX ORDER — HYDROCHLOROTHIAZIDE 25 MG/1
25 TABLET ORAL EVERY MORNING
Qty: 90 TABLET | Refills: 3 | Status: SHIPPED | OUTPATIENT
Start: 2025-05-20

## 2025-06-02 RX ORDER — METOPROLOL SUCCINATE 100 MG/1
100 TABLET, EXTENDED RELEASE ORAL 2 TIMES DAILY
Qty: 180 TABLET | Refills: 3 | Status: SHIPPED | OUTPATIENT
Start: 2025-06-02

## 2025-06-25 NOTE — CASE MANAGEMENT/SOCIAL WORK
Continued Stay Note  BREONNA Parmar     Patient Name: Prema Hawthorne  MRN: [id number]  Today's Date: 6/25/2025    Admit Date: (Not on file)        Discharge Plan       Row Name 06/25/25 1136       Plan    Plan Comments CM spoke with patient today to arrange any needed equipment and discuss physical therapy for her surgery with Dr. Hendricks on 7/10/25. Patient states she has a rolling walker, BSC and rollator at home and does not anticipate needing any other equipment at discharge. CM informed patient that [place] with Dr. Hendricks's office has arranged [facility]  to follow in the home after surgery for physical therapy and she verbalized understanding and is agreeable to this service. She had no other questions. CM will follow.                   Discharge Codes    No documentation.                       Zora Christensen RN

## 2025-06-26 ENCOUNTER — PRE-ADMISSION TESTING (OUTPATIENT)
Dept: PREADMISSION TESTING | Facility: HOSPITAL | Age: 80
End: 2025-06-26
Payer: MEDICARE

## 2025-06-26 VITALS
WEIGHT: 255.8 LBS | SYSTOLIC BLOOD PRESSURE: 125 MMHG | BODY MASS INDEX: 45.32 KG/M2 | DIASTOLIC BLOOD PRESSURE: 76 MMHG | OXYGEN SATURATION: 97 % | RESPIRATION RATE: 18 BRPM | HEART RATE: 80 BPM | HEIGHT: 63 IN

## 2025-06-26 LAB
ANION GAP SERPL CALCULATED.3IONS-SCNC: 13.9 MMOL/L (ref 5–15)
BUN SERPL-MCNC: 26.3 MG/DL (ref 8–23)
BUN/CREAT SERPL: 36 (ref 7–25)
CALCIUM SPEC-SCNC: 9.9 MG/DL (ref 8.6–10.5)
CHLORIDE SERPL-SCNC: 104 MMOL/L (ref 98–107)
CO2 SERPL-SCNC: 20.1 MMOL/L (ref 22–29)
CREAT SERPL-MCNC: 0.73 MG/DL (ref 0.57–1)
DEPRECATED RDW RBC AUTO: 50.2 FL (ref 37–54)
EGFRCR SERPLBLD CKD-EPI 2021: 83.3 ML/MIN/1.73
ERYTHROCYTE [DISTWIDTH] IN BLOOD BY AUTOMATED COUNT: 14.8 % (ref 12.3–15.4)
GLUCOSE SERPL-MCNC: 103 MG/DL (ref 65–99)
HBA1C MFR BLD: 5.26 % (ref 4.8–5.6)
HCT VFR BLD AUTO: 44.7 % (ref 34–46.6)
HGB BLD-MCNC: 15 G/DL (ref 12–15.9)
MCH RBC QN AUTO: 31 PG (ref 26.6–33)
MCHC RBC AUTO-ENTMCNC: 33.6 G/DL (ref 31.5–35.7)
MCV RBC AUTO: 92.4 FL (ref 79–97)
PLATELET # BLD AUTO: 248 10*3/MM3 (ref 140–450)
PMV BLD AUTO: 10.8 FL (ref 6–12)
POTASSIUM SERPL-SCNC: 3.6 MMOL/L (ref 3.5–5.2)
RBC # BLD AUTO: 4.84 10*6/MM3 (ref 3.77–5.28)
SODIUM SERPL-SCNC: 138 MMOL/L (ref 136–145)
WBC NRBC COR # BLD AUTO: 8.06 10*3/MM3 (ref 3.4–10.8)

## 2025-06-26 PROCEDURE — 80048 BASIC METABOLIC PNL TOTAL CA: CPT

## 2025-06-26 PROCEDURE — 85027 COMPLETE CBC AUTOMATED: CPT

## 2025-06-26 PROCEDURE — 83036 HEMOGLOBIN GLYCOSYLATED A1C: CPT

## 2025-06-26 PROCEDURE — 36415 COLL VENOUS BLD VENIPUNCTURE: CPT

## 2025-06-26 NOTE — PAT
Pt here for PAT visit.  Pre-op tests completed, chg soap given, and instructions reviewed.  Instructed clears until 2 hrs prior to arrival time and to bring cpap dos, voiced understanding. Cardiac clearance in notes/letters, patient aware to hold xarelto 3 days prior to surgery. ERAS and SUSU handouts given and reviewed, voiced understanding.

## 2025-07-08 ENCOUNTER — ANESTHESIA EVENT (OUTPATIENT)
Dept: PERIOP | Facility: HOSPITAL | Age: 80
End: 2025-07-08
Payer: MEDICARE

## 2025-07-10 ENCOUNTER — APPOINTMENT (OUTPATIENT)
Dept: GENERAL RADIOLOGY | Facility: HOSPITAL | Age: 80
End: 2025-07-10
Payer: MEDICARE

## 2025-07-10 ENCOUNTER — ANESTHESIA (OUTPATIENT)
Dept: PERIOP | Facility: HOSPITAL | Age: 80
End: 2025-07-10
Payer: MEDICARE

## 2025-07-10 ENCOUNTER — HOSPITAL ENCOUNTER (OUTPATIENT)
Facility: HOSPITAL | Age: 80
Discharge: HOME | End: 2025-07-11
Attending: ORTHOPAEDIC SURGERY | Admitting: ORTHOPAEDIC SURGERY
Payer: MEDICARE

## 2025-07-10 ENCOUNTER — APPOINTMENT (OUTPATIENT)
Dept: ULTRASOUND IMAGING | Facility: HOSPITAL | Age: 80
End: 2025-07-10
Payer: MEDICARE

## 2025-07-10 DIAGNOSIS — M17.11 ARTHRITIS OF RIGHT KNEE: ICD-10-CM

## 2025-07-10 DIAGNOSIS — M17.12 ARTHRITIS OF LEFT KNEE: Primary | ICD-10-CM

## 2025-07-10 PROCEDURE — C1776 JOINT DEVICE (IMPLANTABLE): HCPCS | Performed by: ORTHOPAEDIC SURGERY

## 2025-07-10 PROCEDURE — 25010000002 ONDANSETRON PER 1 MG

## 2025-07-10 PROCEDURE — 25010000002 PROPOFOL 1000 MG/100ML EMULSION: Performed by: NURSE ANESTHETIST, CERTIFIED REGISTERED

## 2025-07-10 PROCEDURE — 25810000003 LACTATED RINGERS PER 1000 ML: Performed by: NURSE ANESTHETIST, CERTIFIED REGISTERED

## 2025-07-10 PROCEDURE — 94799 UNLISTED PULMONARY SVC/PX: CPT

## 2025-07-10 PROCEDURE — 25810000003 LACTATED RINGERS PER 1000 ML

## 2025-07-10 PROCEDURE — G0378 HOSPITAL OBSERVATION PER HR: HCPCS

## 2025-07-10 PROCEDURE — 94761 N-INVAS EAR/PLS OXIMETRY MLT: CPT

## 2025-07-10 PROCEDURE — 25010000002 BUPIVACAINE (PF) 0.25 % SOLUTION: Performed by: NURSE ANESTHETIST, CERTIFIED REGISTERED

## 2025-07-10 PROCEDURE — 73560 X-RAY EXAM OF KNEE 1 OR 2: CPT

## 2025-07-10 PROCEDURE — 25010000002 TRIAMCINOLONE PER 10 MG: Performed by: ORTHOPAEDIC SURGERY

## 2025-07-10 PROCEDURE — A9270 NON-COVERED ITEM OR SERVICE: HCPCS | Performed by: ORTHOPAEDIC SURGERY

## 2025-07-10 PROCEDURE — 25010000002 CEFAZOLIN PER 500 MG: Performed by: ORTHOPAEDIC SURGERY

## 2025-07-10 PROCEDURE — 63710000001 POVIDONE-IODINE 10 % SOLUTION 118 ML BOTTLE: Performed by: ORTHOPAEDIC SURGERY

## 2025-07-10 PROCEDURE — 25010000002 KETOROLAC TROMETHAMINE PER 15 MG: Performed by: ORTHOPAEDIC SURGERY

## 2025-07-10 PROCEDURE — 25810000003 SODIUM CHLORIDE 0.9 % SOLUTION 250 ML FLEX CONT: Performed by: NURSE ANESTHETIST, CERTIFIED REGISTERED

## 2025-07-10 PROCEDURE — 25010000002 PHENYLEPHRINE 10 MG/ML SOLUTION: Performed by: NURSE ANESTHETIST, CERTIFIED REGISTERED

## 2025-07-10 PROCEDURE — 25010000002 LIDOCAINE 2% SOLUTION: Performed by: NURSE ANESTHETIST, CERTIFIED REGISTERED

## 2025-07-10 PROCEDURE — 25810000003 LACTATED RINGERS SOLUTION: Performed by: HOSPITALIST

## 2025-07-10 PROCEDURE — 25010000002 PHENYLEPHRINE 10 MG/ML SOLUTION 5 ML VIAL: Performed by: NURSE ANESTHETIST, CERTIFIED REGISTERED

## 2025-07-10 PROCEDURE — 25010000002 VANCOMYCIN 1 G RECONSTITUTED SOLUTION: Performed by: ORTHOPAEDIC SURGERY

## 2025-07-10 PROCEDURE — 25010000002 GLYCOPYRROLATE 0.2 MG/ML SOLUTION: Performed by: NURSE ANESTHETIST, CERTIFIED REGISTERED

## 2025-07-10 PROCEDURE — C1713 ANCHOR/SCREW BN/BN,TIS/BN: HCPCS | Performed by: ORTHOPAEDIC SURGERY

## 2025-07-10 PROCEDURE — 25010000002 BUPIVACAINE 0.5 % SOLUTION: Performed by: NURSE ANESTHETIST, CERTIFIED REGISTERED

## 2025-07-10 PROCEDURE — 63710000001 TRAMADOL 50 MG TABLET: Performed by: ORTHOPAEDIC SURGERY

## 2025-07-10 PROCEDURE — 25010000002 MIDAZOLAM PER 1MG: Performed by: ANESTHESIOLOGY

## 2025-07-10 PROCEDURE — 99222 1ST HOSP IP/OBS MODERATE 55: CPT | Performed by: HOSPITALIST

## 2025-07-10 PROCEDURE — 25010000002 EPINEPHRINE PER 0.1 MG: Performed by: ORTHOPAEDIC SURGERY

## 2025-07-10 PROCEDURE — 25010000002 ROPIVACAINE PER 1 MG: Performed by: ORTHOPAEDIC SURGERY

## 2025-07-10 PROCEDURE — 97161 PT EVAL LOW COMPLEX 20 MIN: CPT

## 2025-07-10 PROCEDURE — 25810000003 SODIUM CHLORIDE 0.9 % SOLUTION: Performed by: HOSPITALIST

## 2025-07-10 PROCEDURE — 25010000002 FAMOTIDINE 10 MG/ML SOLUTION

## 2025-07-10 PROCEDURE — 25010000002 MORPHINE SULFATE (PF) 10 MG/ML SOLUTION 1 ML VIAL: Performed by: ORTHOPAEDIC SURGERY

## 2025-07-10 PROCEDURE — 25010000002 DEXAMETHASONE PER 1 MG

## 2025-07-10 DEVICE — FEMORAL COMPONENT SPHERIKA CEMENTED  S3L
Type: IMPLANTABLE DEVICE | Site: KNEE | Status: FUNCTIONAL
Brand: GMK TOTAL KNEE SYSTEM

## 2025-07-10 DEVICE — PALACOS® R+G IS A FAST SETTING POLYMER CONTAINING GENTAMICIN, FOR USE IN BONE SURGERY. MIXING OF THE TWO COMPONENT SYSTEM, CONSISTING OF A POWDER AND A LIQUID, INITIALLY PRODUCES A LIQUID AND THEN A PASTE, WHICH IS USED TO ANCHOR THE PROSTHESIS TO THE BONE. THE HARDENED BONE CEMENT ALLOWS STABLE FIXATION OF THE PROSTHESIS AND TRANSFERS ALL STRESSES PRODUCED IN A MOVEMENT TO THE BONE VIA THE LARGE INTERFACE. INSOLUBLE ZIRCONIUM DIOXIDE IS INCLUDED IN THE CEMENT POWDER AS AN X RAY CONTRAST MEDIUM. THE CHLOROPHYLL ADDITIVE SERVES AS OPTICAL MARKING OF THE BONE CEMENT AT THE SITE OF THE OPERATION.
Type: IMPLANTABLE DEVICE | Site: KNEE | Status: FUNCTIONAL
Brand: PALACOS®

## 2025-07-10 DEVICE — TIBIAL INSERT FIXED SPHERE FLEX   #2/10 MM L E-CROSS
Type: IMPLANTABLE DEVICE | Site: KNEE | Status: FUNCTIONAL
Brand: GMK SPHERE TOTAL KNEE SYSTEM

## 2025-07-10 DEVICE — KNOTLESS TISSUE CONTROL DEVICE, VIOLET UNIDIRECTIONAL (ANTIBACTERIAL) SYNTHETIC ABSORBABLE DEVICE
Type: IMPLANTABLE DEVICE | Site: KNEE | Status: FUNCTIONAL
Brand: STRATAFIX

## 2025-07-10 DEVICE — TOTL KN: Type: IMPLANTABLE DEVICE | Site: KNEE | Status: FUNCTIONAL

## 2025-07-10 DEVICE — FIXED TIBIAL TRAY CEMENTED LEFT, SIZE 2
Type: IMPLANTABLE DEVICE | Site: KNEE | Status: FUNCTIONAL
Brand: GMK PRIMARY TOTAL KNEE SYSTEM

## 2025-07-10 DEVICE — STRATAFIX (SPIRAL PDS PLUS), BIDIRECTIONAL
Type: IMPLANTABLE DEVICE | Site: KNEE | Status: FUNCTIONAL
Brand: STRATAFIX

## 2025-07-10 DEVICE — DEV CONTRL TISS STRATAFIX SPIRAL MNCRYL UD 3/0 PLS 30CM: Type: IMPLANTABLE DEVICE | Site: KNEE | Status: FUNCTIONAL

## 2025-07-10 RX ORDER — OXYCODONE AND ACETAMINOPHEN 5; 325 MG/1; MG/1
1 TABLET ORAL ONCE AS NEEDED
Status: DISCONTINUED | OUTPATIENT
Start: 2025-07-10 | End: 2025-07-10 | Stop reason: HOSPADM

## 2025-07-10 RX ORDER — NALOXONE HCL 0.4 MG/ML
0.1 VIAL (ML) INJECTION
Status: DISCONTINUED | OUTPATIENT
Start: 2025-07-10 | End: 2025-07-11 | Stop reason: HOSPADM

## 2025-07-10 RX ORDER — NITROGLYCERIN 0.4 MG/1
0.4 TABLET SUBLINGUAL
Status: DISCONTINUED | OUTPATIENT
Start: 2025-07-10 | End: 2025-07-11 | Stop reason: HOSPADM

## 2025-07-10 RX ORDER — FAMOTIDINE 20 MG/1
40 TABLET, FILM COATED ORAL DAILY
Status: DISCONTINUED | OUTPATIENT
Start: 2025-07-11 | End: 2025-07-11 | Stop reason: HOSPADM

## 2025-07-10 RX ORDER — NALOXONE HCL 0.4 MG/ML
0.4 VIAL (ML) INJECTION
Status: DISCONTINUED | OUTPATIENT
Start: 2025-07-10 | End: 2025-07-11 | Stop reason: HOSPADM

## 2025-07-10 RX ORDER — EPHEDRINE SULFATE 50 MG/ML
INJECTION INTRAVENOUS AS NEEDED
Status: DISCONTINUED | OUTPATIENT
Start: 2025-07-10 | End: 2025-07-10 | Stop reason: SURG

## 2025-07-10 RX ORDER — DROPERIDOL 2.5 MG/ML
1.25 INJECTION, SOLUTION INTRAMUSCULAR; INTRAVENOUS ONCE AS NEEDED
Status: DISCONTINUED | OUTPATIENT
Start: 2025-07-10 | End: 2025-07-10 | Stop reason: HOSPADM

## 2025-07-10 RX ORDER — DEXAMETHASONE SODIUM PHOSPHATE 4 MG/ML
8 INJECTION, SOLUTION INTRA-ARTICULAR; INTRALESIONAL; INTRAMUSCULAR; INTRAVENOUS; SOFT TISSUE ONCE AS NEEDED
Status: COMPLETED | OUTPATIENT
Start: 2025-07-10 | End: 2025-07-10

## 2025-07-10 RX ORDER — TRANEXAMIC ACID 10 MG/ML
1000 INJECTION, SOLUTION INTRAVENOUS ONCE
Status: COMPLETED | OUTPATIENT
Start: 2025-07-10 | End: 2025-07-10

## 2025-07-10 RX ORDER — TRAMADOL HYDROCHLORIDE 50 MG/1
50 TABLET ORAL EVERY 8 HOURS PRN
Status: DISCONTINUED | OUTPATIENT
Start: 2025-07-10 | End: 2025-07-11 | Stop reason: HOSPADM

## 2025-07-10 RX ORDER — PROPOFOL 10 MG/ML
INJECTION, EMULSION INTRAVENOUS AS NEEDED
Status: DISCONTINUED | OUTPATIENT
Start: 2025-07-10 | End: 2025-07-10 | Stop reason: SURG

## 2025-07-10 RX ORDER — MAGNESIUM HYDROXIDE 1200 MG/15ML
LIQUID ORAL AS NEEDED
Status: DISCONTINUED | OUTPATIENT
Start: 2025-07-10 | End: 2025-07-10 | Stop reason: HOSPADM

## 2025-07-10 RX ORDER — ACETAMINOPHEN 500 MG
1000 TABLET ORAL ONCE
Status: COMPLETED | OUTPATIENT
Start: 2025-07-10 | End: 2025-07-10

## 2025-07-10 RX ORDER — OXYCODONE HYDROCHLORIDE 5 MG/1
10 TABLET ORAL EVERY 4 HOURS PRN
Refills: 0 | Status: DISCONTINUED | OUTPATIENT
Start: 2025-07-10 | End: 2025-07-11 | Stop reason: HOSPADM

## 2025-07-10 RX ORDER — DEXMEDETOMIDINE HYDROCHLORIDE 100 UG/ML
INJECTION, SOLUTION INTRAVENOUS
Status: COMPLETED | OUTPATIENT
Start: 2025-07-10 | End: 2025-07-10

## 2025-07-10 RX ORDER — MELOXICAM 7.5 MG/1
7.5 TABLET ORAL DAILY
Status: DISCONTINUED | OUTPATIENT
Start: 2025-07-10 | End: 2025-07-10 | Stop reason: HOSPADM

## 2025-07-10 RX ORDER — SODIUM CHLORIDE 0.9 % (FLUSH) 0.9 %
10 SYRINGE (ML) INJECTION AS NEEDED
Status: DISCONTINUED | OUTPATIENT
Start: 2025-07-10 | End: 2025-07-10 | Stop reason: HOSPADM

## 2025-07-10 RX ORDER — ACETAMINOPHEN 325 MG/1
325 TABLET ORAL EVERY 4 HOURS PRN
Status: DISCONTINUED | OUTPATIENT
Start: 2025-07-10 | End: 2025-07-11 | Stop reason: HOSPADM

## 2025-07-10 RX ORDER — SODIUM CHLORIDE, SODIUM LACTATE, POTASSIUM CHLORIDE, CALCIUM CHLORIDE 600; 310; 30; 20 MG/100ML; MG/100ML; MG/100ML; MG/100ML
100 INJECTION, SOLUTION INTRAVENOUS CONTINUOUS
Status: ACTIVE | OUTPATIENT
Start: 2025-07-10 | End: 2025-07-11

## 2025-07-10 RX ORDER — ONDANSETRON 2 MG/ML
4 INJECTION INTRAMUSCULAR; INTRAVENOUS ONCE AS NEEDED
Status: COMPLETED | OUTPATIENT
Start: 2025-07-10 | End: 2025-07-10

## 2025-07-10 RX ORDER — SODIUM CHLORIDE 9 MG/ML
125 INJECTION, SOLUTION INTRAVENOUS CONTINUOUS
Status: DISCONTINUED | OUTPATIENT
Start: 2025-07-10 | End: 2025-07-11 | Stop reason: HOSPADM

## 2025-07-10 RX ORDER — GABAPENTIN 300 MG/1
300 CAPSULE ORAL ONCE
Status: COMPLETED | OUTPATIENT
Start: 2025-07-10 | End: 2025-07-10

## 2025-07-10 RX ORDER — ONDANSETRON 2 MG/ML
4 INJECTION INTRAMUSCULAR; INTRAVENOUS ONCE AS NEEDED
Status: DISCONTINUED | OUTPATIENT
Start: 2025-07-10 | End: 2025-07-10 | Stop reason: HOSPADM

## 2025-07-10 RX ORDER — PHENYLEPHRINE HYDROCHLORIDE 10 MG/ML
INJECTION INTRAVENOUS AS NEEDED
Status: DISCONTINUED | OUTPATIENT
Start: 2025-07-10 | End: 2025-07-10 | Stop reason: SURG

## 2025-07-10 RX ORDER — BUPIVACAINE HYDROCHLORIDE 2.5 MG/ML
INJECTION, SOLUTION EPIDURAL; INFILTRATION; INTRACAUDAL; PERINEURAL
Status: COMPLETED | OUTPATIENT
Start: 2025-07-10 | End: 2025-07-10

## 2025-07-10 RX ORDER — OXYCODONE HYDROCHLORIDE 5 MG/1
15 TABLET ORAL EVERY 4 HOURS PRN
Status: DISCONTINUED | OUTPATIENT
Start: 2025-07-10 | End: 2025-07-10

## 2025-07-10 RX ORDER — SODIUM CHLORIDE, SODIUM LACTATE, POTASSIUM CHLORIDE, CALCIUM CHLORIDE 600; 310; 30; 20 MG/100ML; MG/100ML; MG/100ML; MG/100ML
9 INJECTION, SOLUTION INTRAVENOUS CONTINUOUS
Status: ACTIVE | OUTPATIENT
Start: 2025-07-10 | End: 2025-07-11

## 2025-07-10 RX ORDER — OXYCODONE HYDROCHLORIDE 5 MG/1
20 TABLET ORAL EVERY 4 HOURS PRN
Status: DISCONTINUED | OUTPATIENT
Start: 2025-07-10 | End: 2025-07-10

## 2025-07-10 RX ORDER — DEXMEDETOMIDINE HYDROCHLORIDE 100 UG/ML
INJECTION, SOLUTION INTRAVENOUS AS NEEDED
Status: DISCONTINUED | OUTPATIENT
Start: 2025-07-10 | End: 2025-07-10 | Stop reason: SURG

## 2025-07-10 RX ORDER — ONDANSETRON 4 MG/1
4 TABLET, FILM COATED ORAL EVERY 8 HOURS PRN
Qty: 30 TABLET | Refills: 0 | Status: SHIPPED | OUTPATIENT
Start: 2025-07-10

## 2025-07-10 RX ORDER — OXYCODONE HYDROCHLORIDE 5 MG/1
15 TABLET ORAL EVERY 4 HOURS PRN
Refills: 0 | Status: DISCONTINUED | OUTPATIENT
Start: 2025-07-10 | End: 2025-07-10

## 2025-07-10 RX ORDER — DOCUSATE SODIUM 100 MG/1
100 CAPSULE, LIQUID FILLED ORAL 2 TIMES DAILY PRN
Status: DISCONTINUED | OUTPATIENT
Start: 2025-07-10 | End: 2025-07-11 | Stop reason: HOSPADM

## 2025-07-10 RX ORDER — METOPROLOL SUCCINATE 50 MG/1
100 TABLET, EXTENDED RELEASE ORAL 2 TIMES DAILY
Status: DISCONTINUED | OUTPATIENT
Start: 2025-07-10 | End: 2025-07-11 | Stop reason: HOSPADM

## 2025-07-10 RX ORDER — DOXYCYCLINE 100 MG/1
100 CAPSULE ORAL 2 TIMES DAILY
Qty: 14 CAPSULE | Refills: 0 | Status: SHIPPED | OUTPATIENT
Start: 2025-07-10

## 2025-07-10 RX ORDER — FAMOTIDINE 10 MG/ML
20 INJECTION, SOLUTION INTRAVENOUS
Status: COMPLETED | OUTPATIENT
Start: 2025-07-10 | End: 2025-07-10

## 2025-07-10 RX ORDER — BUPIVACAINE HYDROCHLORIDE 5 MG/ML
INJECTION, SOLUTION PERINEURAL
Status: COMPLETED | OUTPATIENT
Start: 2025-07-10 | End: 2025-07-10

## 2025-07-10 RX ORDER — VANCOMYCIN HYDROCHLORIDE 1 G/20ML
INJECTION, POWDER, LYOPHILIZED, FOR SOLUTION INTRAVENOUS AS NEEDED
Status: DISCONTINUED | OUTPATIENT
Start: 2025-07-10 | End: 2025-07-10 | Stop reason: HOSPADM

## 2025-07-10 RX ORDER — LISINOPRIL 10 MG/1
10 TABLET ORAL
Status: DISCONTINUED | OUTPATIENT
Start: 2025-07-11 | End: 2025-07-11 | Stop reason: HOSPADM

## 2025-07-10 RX ORDER — MIDAZOLAM HYDROCHLORIDE 2 MG/2ML
0.5 INJECTION, SOLUTION INTRAMUSCULAR; INTRAVENOUS
Status: COMPLETED | OUTPATIENT
Start: 2025-07-10 | End: 2025-07-10

## 2025-07-10 RX ORDER — HYDROCHLOROTHIAZIDE 25 MG/1
25 TABLET ORAL EVERY MORNING
Status: DISCONTINUED | OUTPATIENT
Start: 2025-07-11 | End: 2025-07-11 | Stop reason: HOSPADM

## 2025-07-10 RX ORDER — HYDROMORPHONE HYDROCHLORIDE 1 MG/ML
0.5 INJECTION, SOLUTION INTRAMUSCULAR; INTRAVENOUS; SUBCUTANEOUS
Status: DISCONTINUED | OUTPATIENT
Start: 2025-07-10 | End: 2025-07-11 | Stop reason: HOSPADM

## 2025-07-10 RX ORDER — ONDANSETRON 4 MG/1
4 TABLET, ORALLY DISINTEGRATING ORAL EVERY 6 HOURS PRN
Status: DISCONTINUED | OUTPATIENT
Start: 2025-07-10 | End: 2025-07-11 | Stop reason: HOSPADM

## 2025-07-10 RX ORDER — OXYCODONE HYDROCHLORIDE 5 MG/1
15 TABLET ORAL EVERY 4 HOURS PRN
Refills: 0 | Status: DISCONTINUED | OUTPATIENT
Start: 2025-07-10 | End: 2025-07-11 | Stop reason: HOSPADM

## 2025-07-10 RX ORDER — TRANEXAMIC ACID 650 MG/1
650 TABLET ORAL 2 TIMES DAILY
Qty: 14 TABLET | Refills: 0 | Status: SHIPPED | OUTPATIENT
Start: 2025-07-10

## 2025-07-10 RX ORDER — LIDOCAINE HYDROCHLORIDE 10 MG/ML
0.5 INJECTION, SOLUTION EPIDURAL; INFILTRATION; INTRACAUDAL; PERINEURAL ONCE AS NEEDED
Status: DISCONTINUED | OUTPATIENT
Start: 2025-07-10 | End: 2025-07-10 | Stop reason: HOSPADM

## 2025-07-10 RX ORDER — LIDOCAINE HYDROCHLORIDE 20 MG/ML
INJECTION, SOLUTION INFILTRATION; PERINEURAL AS NEEDED
Status: DISCONTINUED | OUTPATIENT
Start: 2025-07-10 | End: 2025-07-10 | Stop reason: SURG

## 2025-07-10 RX ORDER — ONDANSETRON 2 MG/ML
4 INJECTION INTRAMUSCULAR; INTRAVENOUS EVERY 6 HOURS PRN
Status: DISCONTINUED | OUTPATIENT
Start: 2025-07-10 | End: 2025-07-11 | Stop reason: HOSPADM

## 2025-07-10 RX ORDER — SODIUM CHLORIDE 9 MG/ML
40 INJECTION, SOLUTION INTRAVENOUS AS NEEDED
Status: DISCONTINUED | OUTPATIENT
Start: 2025-07-10 | End: 2025-07-10 | Stop reason: HOSPADM

## 2025-07-10 RX ORDER — SODIUM CHLORIDE AND POTASSIUM CHLORIDE 150; 900 MG/100ML; MG/100ML
100 INJECTION, SOLUTION INTRAVENOUS CONTINUOUS
Status: DISCONTINUED | OUTPATIENT
Start: 2025-07-10 | End: 2025-07-10

## 2025-07-10 RX ORDER — SODIUM CHLORIDE 0.9 % (FLUSH) 0.9 %
10 SYRINGE (ML) INJECTION EVERY 12 HOURS SCHEDULED
Status: DISCONTINUED | OUTPATIENT
Start: 2025-07-10 | End: 2025-07-10 | Stop reason: HOSPADM

## 2025-07-10 RX ORDER — GLYCOPYRROLATE 0.2 MG/ML
INJECTION INTRAMUSCULAR; INTRAVENOUS AS NEEDED
Status: DISCONTINUED | OUTPATIENT
Start: 2025-07-10 | End: 2025-07-10 | Stop reason: SURG

## 2025-07-10 RX ADMIN — MELOXICAM 7.5 MG: 7.5 TABLET ORAL at 07:05

## 2025-07-10 RX ADMIN — SODIUM CHLORIDE, POTASSIUM CHLORIDE, SODIUM LACTATE AND CALCIUM CHLORIDE 9 ML/HR: 600; 310; 30; 20 INJECTION, SOLUTION INTRAVENOUS at 06:26

## 2025-07-10 RX ADMIN — CEFAZOLIN 2 G: 2 INJECTION, POWDER, FOR SOLUTION INTRAVENOUS at 07:48

## 2025-07-10 RX ADMIN — EPHEDRINE SULFATE 10 MG: 50 INJECTION INTRAVENOUS at 08:30

## 2025-07-10 RX ADMIN — TRANEXAMIC ACID 1000 MG: 10 INJECTION, SOLUTION INTRAVENOUS at 07:48

## 2025-07-10 RX ADMIN — GLYCOPYRROLATE 0.1 MG: 0.2 INJECTION INTRAMUSCULAR; INTRAVENOUS at 08:23

## 2025-07-10 RX ADMIN — TRAMADOL HYDROCHLORIDE 50 MG: 50 TABLET, COATED ORAL at 17:24

## 2025-07-10 RX ADMIN — PHENYLEPHRINE HYDROCHLORIDE 100 MCG: 10 INJECTION INTRAVENOUS at 08:50

## 2025-07-10 RX ADMIN — MIDAZOLAM HYDROCHLORIDE 0.5 MG: 1 INJECTION, SOLUTION INTRAMUSCULAR; INTRAVENOUS at 07:06

## 2025-07-10 RX ADMIN — FAMOTIDINE 20 MG: 10 INJECTION INTRAVENOUS at 07:06

## 2025-07-10 RX ADMIN — ONDANSETRON 4 MG: 2 INJECTION, SOLUTION INTRAMUSCULAR; INTRAVENOUS at 07:06

## 2025-07-10 RX ADMIN — BUPIVACAINE HYDROCHLORIDE 15 ML: 2.5 INJECTION, SOLUTION EPIDURAL; INFILTRATION; INTRACAUDAL; PERINEURAL at 07:21

## 2025-07-10 RX ADMIN — DEXMEDETOMIDINE HYDROCHLORIDE 30 MCG: 100 INJECTION, SOLUTION INTRAVENOUS at 07:13

## 2025-07-10 RX ADMIN — BUPIVACAINE HYDROCHLORIDE 15 ML: 2.5 INJECTION, SOLUTION EPIDURAL; INFILTRATION; INTRACAUDAL; PERINEURAL at 07:19

## 2025-07-10 RX ADMIN — PROPOFOL 125 MCG/KG/MIN: 10 INJECTION, EMULSION INTRAVENOUS at 07:45

## 2025-07-10 RX ADMIN — PROPOFOL 20 MG: 10 INJECTION, EMULSION INTRAVENOUS at 07:46

## 2025-07-10 RX ADMIN — ACETAMINOPHEN 1000 MG: 500 TABLET, FILM COATED ORAL at 07:05

## 2025-07-10 RX ADMIN — SODIUM CHLORIDE, POTASSIUM CHLORIDE, SODIUM LACTATE AND CALCIUM CHLORIDE 100 ML/HR: 600; 310; 30; 20 INJECTION, SOLUTION INTRAVENOUS at 09:45

## 2025-07-10 RX ADMIN — BUPIVACAINE HYDROCHLORIDE 1.8 ML: 5 INJECTION, SOLUTION PERINEURAL at 07:35

## 2025-07-10 RX ADMIN — SODIUM CHLORIDE 2 G: 9 INJECTION, SOLUTION INTRAVENOUS at 17:24

## 2025-07-10 RX ADMIN — PHENYLEPHRINE HYDROCHLORIDE 100 MCG: 10 INJECTION INTRAVENOUS at 07:49

## 2025-07-10 RX ADMIN — DEXMEDETOMIDINE 10 MCG: 100 INJECTION, SOLUTION, CONCENTRATE INTRAVENOUS at 08:27

## 2025-07-10 RX ADMIN — SODIUM CHLORIDE 125 ML/HR: 9 INJECTION, SOLUTION INTRAVENOUS at 21:03

## 2025-07-10 RX ADMIN — PHENYLEPHRINE HYDROCHLORIDE 100 MCG: 10 INJECTION INTRAVENOUS at 08:00

## 2025-07-10 RX ADMIN — PHENYLEPHRINE HYDROCHLORIDE 20 MCG/MIN: 10 INJECTION INTRAVENOUS at 08:14

## 2025-07-10 RX ADMIN — PHENYLEPHRINE HYDROCHLORIDE 100 MCG: 10 INJECTION INTRAVENOUS at 08:05

## 2025-07-10 RX ADMIN — BUPIVACAINE HYDROCHLORIDE 20 ML: 2.5 INJECTION, SOLUTION EPIDURAL; INFILTRATION; INTRACAUDAL; PERINEURAL at 07:13

## 2025-07-10 RX ADMIN — DEXAMETHASONE SODIUM PHOSPHATE 8 MG: 4 INJECTION, SOLUTION INTRA-ARTICULAR; INTRALESIONAL; INTRAMUSCULAR; INTRAVENOUS; SOFT TISSUE at 07:06

## 2025-07-10 RX ADMIN — DEXMEDETOMIDINE HYDROCHLORIDE 20 MCG: 100 INJECTION, SOLUTION INTRAVENOUS at 07:19

## 2025-07-10 RX ADMIN — PHENYLEPHRINE HYDROCHLORIDE 100 MCG: 10 INJECTION INTRAVENOUS at 07:51

## 2025-07-10 RX ADMIN — LIDOCAINE HYDROCHLORIDE 20 MG: 20 INJECTION, SOLUTION INFILTRATION; PERINEURAL at 07:46

## 2025-07-10 RX ADMIN — GABAPENTIN 300 MG: 300 CAPSULE ORAL at 07:05

## 2025-07-10 RX ADMIN — PHENYLEPHRINE HYDROCHLORIDE 100 MCG: 10 INJECTION INTRAVENOUS at 08:11

## 2025-07-10 RX ADMIN — SODIUM CHLORIDE, POTASSIUM CHLORIDE, SODIUM LACTATE AND CALCIUM CHLORIDE 1000 ML: 600; 310; 30; 20 INJECTION, SOLUTION INTRAVENOUS at 16:08

## 2025-07-10 RX ADMIN — MIDAZOLAM HYDROCHLORIDE 0.5 MG: 1 INJECTION, SOLUTION INTRAMUSCULAR; INTRAVENOUS at 07:16

## 2025-07-10 NOTE — CONSULTS
[facility] HOSPITALIST     Violet Morris APRN    Reason for Consultation: Post-op Hypotension    HISTORY OF PRESENT ILLNESS:    Ms. Hawthorne is a very pleasant, 79-year-old female who underwent successful left total knee arthroplasty today due to severe end-stage degenerative arthritis.  She reports a long history of knee pain that has been recalcitrant to multiple cortisone injections as well as Orthovisc injections.  She had previously had her right knee done last year.  She was able to participate with physical therapy today without dizziness.  She denies chest pain and palpitations.  She is tolerating p.o. well.  She reports good postop pain control.      Past Medical History:   Diagnosis Date    Arrhythmia 3/2011    Arthritis     Atrial fibrillation     with rapid ventricular response (CHRONIC)    Cancer 03/2021    Uterine cancer detected    Cardiomyopathy     nonischemic, Dr Mejia follows, clearance in notes    Essential hypertension     Hyperlipidemia     Knee pain, bilateral     GETS INJECTIONS EVERY 3 MONTHS    Morbid obesity     On anticoagulant therapy     xarelto    DAVID (obstructive sleep apnea) 2011    CPAP nightly    PONV (postoperative nausea and vomiting)     Postmenopausal bleeding     PVC (premature ventricular contraction) 03/25/2016    Shortness of breath     EXERTION    Systolic heart failure     Urinary incontinence      Past Surgical History:   Procedure Laterality Date    BLADDER SURGERY      CARDIAC CATHETERIZATION  03/2011    CATARACT EXTRACTION, BILATERAL      COLONOSCOPY      D & C HYSTEROSCOPY N/A 03/24/2021    Procedure: HYSTEROSCOPY WITH  DILATATION AND CURETTAGE, POLYPECTOMY WITH MYOSURE;  Surgeon: Christiane Goldman MD;  Location: [facility] [place];  Service: Obstetrics/Gynecology;  Laterality: N/A;    HYSTERECTOMY  05/2021    TOTAL KNEE ARTHROPLASTY Right 11/14/2024    Procedure: TOTAL KNEE ARTHROPLASTY;  Surgeon: Kadeem Hendricks MD;  Location: [facility] OR;   Service: Orthopedics;  Laterality: Right;     Family History   Problem Relation Age of Onset    Diabetes Mother     Heart disease Father     Hypertension Father     Other Father         heart problems    Heart disease Brother     Heart disease Brother     Hypertension Brother     Malig Hyperthermia Neg Hx      Social History     Tobacco Use    Smoking status: Never     Passive exposure: Never    Smokeless tobacco: Never   Vaping Use    Vaping status: Never Used   Substance Use Topics    Alcohol use: Yes     Comment: occasionally / wine    Drug use: No     Comment: caffeine use      Medications Prior to Admission   Medication Sig Dispense Refill Last Dose/Taking    hydroCHLOROthiazide 25 MG tablet TAKE 1 TABLET BY MOUTH EVERY  MORNING 90 tablet 3 7/9/2025 at  9:00 AM    metoprolol succinate XL (TOPROL-XL) 100 MG 24 hr tablet TAKE 1 TABLET BY MOUTH TWICE  DAILY 180 tablet 3 7/10/2025 at  4:00 AM    ramipril (ALTACE) 2.5 MG capsule Take 1 capsule by mouth Every Night. 90 capsule 3 7/9/2025 at  9:00 PM    Turmeric-Fish Oil 125-600 MG capsule delayed-release Take 2 capsules by mouth Daily.   Past Month    Ubiquinol 100 MG capsule Take 1 capsule by mouth Daily.   Past Month    Unable to find Take 1 each by mouth Daily. Med Name: Collagen + multi powder   Past Month    Vitamin A-Vitamin D-Minerals (Super D3 Complex) capsule Take 2 capsules by mouth Daily.   Past Month    oxyCODONE (ROXICODONE) 15 MG immediate release tablet Take 1 tablet by mouth Every 4 (Four) Hours As Needed. 40 tablet 0     oxyCODONE-acetaminophen (PERCOCET) 7.5-325 MG per tablet Take 1 tablet by mouth Every 4 (Four) Hours As Needed for Moderate Pain. 40 tablet 0 More than a month    Xarelto 20 MG tablet TAKE 1 TABLET BY MOUTH DAILY  WITH DINNER 90 tablet 3 7/6/2025 at  9:00 PM     Allergies:  Codeine    REVIEW OF SYSTEMS:  Please see the above history of present illness for pertinent positives and negatives.  The remainder of the patient's systems have  "been reviewed and are negative.    Vital Signs  Temp:  [97.6 °F (36.4 °C)-97.8 °F (36.6 °C)] 97.8 °F (36.6 °C)  Heart Rate:  [56-84] 84  Resp:  [10-24] 16  BP: ()/(43-72) 83/47  Oxygen Therapy  SpO2: 98 %  Pulse Oximetry Type: Continuous  Device (Oxygen Therapy): nasal cannula with ETCO2, room air  Flow (L/min) (Oxygen Therapy): 0  ETCO2 (mmHg): 36 mmHg  Probe Placed On (Pulse Ox): Right:, finger}  Body mass index is 45.53 kg/m².    Flowsheet Rows      Flowsheet Row First Filed Value   Admission Height 160 cm (63\") Documented at 07/10/2025 0626   Admission Weight 117 kg (257 lb) Documented at 07/10/2025 0626               Physical Exam:  Physical Exam   Constitutional: Patient appears well developed and well nourished and in no acute distress.   HEENT:   Head: Normocephalic and atraumatic.   Eyes:  Pupils are equal, round, and EOM are intact. Sclerae are anicteric and noninjected.  Mouth and Throat: Patient has moist mucous membranes. Oropharynx is clear of any erythema or exudate.     Cardiovascular: Irregular rate and rhythm, S1 normal and S2 normal.  No murmur heard.  Radial and pedal pulses are 2+ and symmetric.  Pulmonary/Chest: Lungs are clear to auscultation bilaterally. No respiratory distress. No wheezes. No rhonchi. No rales.   Abdominal: Morbidly obese. Soft. Bowel sounds are normal. There is no tenderness.   Musculoskeletal: Normal muscle tone  Extremities: No edema.   Neurological: Patient is alert and oriented to person, place, and time. Cranial nerves II-XII are grossly intact with no focal deficits.     Results Review:    I reviewed the patient's new clinical results.  Lab Results (most recent)       None            Imaging Results (Most Recent)       Procedure Component Value Units Date/Time    XR Knee 1 or 2 View Left [[id number]] Collected: 07/10/25 0943     Updated: 07/10/25 0946    Narrative:      XR KNEE 1 OR 2 VW LEFT    Date of Exam: 7/10/2025 9:28 AM EDT    Indication: Post-Op Knee " Arthoplasty    Comparison: None available.    Findings:  There has been placement of a total knee arthroplasty in near-anatomic alignment. No periprosthetic fracture is identified. Some postoperative gas is noted.      Impression:      Impression:  Status post total knee arthroplasty in near-anatomic alignment, with no evidence of immediate complication.        Electronically Signed: Riki Alonso MD    7/10/2025 9:43 AM EDT    Workstation ID: [id number]    US Sonosite Portable [[id number]] Resulted: 07/10/25 0558     Updated: 07/10/25 0558    Narrative:      This procedure was auto-finalized with no dictation required.          reviewed    ECG/EMG Results (most recent)       None              Assessment & Plan     Hypotension: Blood pressure responding to fluid boluses.  I reviewed her most recent cardiology clinic note by Dr. Mejia, and it reads that she is to take her hydrochlorothiazide and metoprolol in the morning and then her ACE inhibitor and Lopressor at night.  She reports she did take both blood pressure medicines last night.  I suspect that this on top of anesthesia is what is likely causing her hypotension.  Will hold these medications.  Continue IV fluids.  Nausea with pain medicine.  Permanent atrial fibrillation: Anticoagulation as per surgery.  Holding metoprolol as noted above.  Continue to monitor on telemetry.  I feel that we can add the Toprol back if needed albeit at a lower dose.  We may still need to make this change in the morning.  Continue to monitor.  Obstructive sleep apnea: She has brought her home device in.    I discussed the patient's findings and my recommendations with patient.     Jaime Hernandes MD  07/10/25  16:15 EDT

## 2025-07-10 NOTE — OP NOTE
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   Post-operative diagnosis: Same  Procedure performed:Left total knee arthroplasty CPT 07867  Approach: Parapatellar   Implants:  Medacta SphereTKA   Femur: 3  CR   Tibial tray: 2   Patella - none   Bearing - 10 mm Medial Congruent    Kinematic Alignment TKA    Varus   ACL not functional  PCL intact    Distal Femur     Medial - worn 6 mm  Lateral - unworn 8 mm    Posterior femur    Medial - unworn 7 mm  Lateral - unworn 7 mm    Tibia    Medial spine - 9.5 mm  Lateral spine - 9.5 mm    Recut - no    Final check:  Negligible V-V in extension  2-3 mm opening w varus in 15-30 degrees of flexion      Surgeon: Kadeem Hendricks M.D.   Assistant(s):  1. CRARIE Martell    Anesthesia: Spinal/Adductor Canal Femoral Nerve/IPAC Block  Anesthesiologist: Gurpreet  Estimated blood loss: 50 milliliters   Drains: None  Specimens: None  Complications: None apparent     Findings: Severe knee arthritis            INDICATIONS:  Prema Hawthorne is a pleasant 79 y.o. year old who presented to my office with a long history of knee pain. The patient failed conservative therapy and we discussed surgical treatment options including total knee arthroplasty. Prior to surgery we discussed the risks, benefits, alternatives to surgery, and surgical convalescence.  Surgical consent was obtained both in the office, as well as the day of surgery. Risks of the procedure include, but are not limited to, infection, DVT, PE, damage to nerves or blood vessels at the time of surgery, bleeding and blood loss, stiffness/arthrofibrosis, and risk of loosening or failure of the components requiring revision surgery. The patient expressed understanding and wished to  proceed with the surgery. An informed consent form was signed and placed on the chart prior to coming to the Operating Room.     PROCEDURE: The patient was brought to the operating room and once obtaining satisfactory anesthesia was placed in the supine position. The knee was prepped and draped in the usual sterile fashion for a total knee replacement. The leg was exsanguinated with an Esmarch bandage and the pneumatic tourniquet inflated to 250 mm mercury. A surgical timeout was held verifying the site, procedure, and that pre-operative antibiotics had been given.  A longitudinal incision was over the anterior aspect of the knee exposing the extensor mechanism. An arthrotomy was performed and the patella displaced laterally. The gross pathologic findings revealed severe degenerative arthritis.    Subperiosteal dissection was continued around the proximal medial tibia. The necessary soft tissue release was performed to correct the fixed angular deformity. Care was taken to protect and preserve the medial collateral ligament.     The drill was used to open the femoral canal. After over drilling the canal, the intramedullary femoral guide was seated and the distal femur was resected utilizing the worn and unworn paddles. The femoral sizing guide was set at neutral external rotation.  The femur was then sized and the appropriate component selected. The anterior and posterior condyles were then resected with the appropriately sized guide and oscillating saw.  All bone resections were measured and confirmed to be appropriate thicknesses.     Then the extramedullary tibial cutting guide was then placed and the proximal tibia was resected at the appropriate level along the axis of the tibia.    With the knee at 90 degrees of flexion, laminar spacers were placed between the femur and tibia. The remaining anterior cruciate ligament was excised. The posterior cruciate ligament was left intact.  A medial and lateral  meniscectomy were performed. A mixture of 60 mL of saline, 0.5% ropivacaine, 10 mg morphine, 30 mg Toradol, 0.2 mg of epinephrine, and 40 mg Kenalog were injected into the knee joint capsule posteriorly while the lamina spreaders were in position and in the deep tissues.  The solution without steroid was utilized at the capsulotomy incision sites prior to closure for local anesthesia.     The extension space was confirmed to be appropriate with full extension of the knee with an appropriately sized spacer and to be well balanced with varus and valgus stress.     The flexion and extension gaps were checked with the appropriate sized spacer and noted to be well balanced. At this time, the tibial cut was checked with the spacer block.    The tibial fixation hole was created with the tibial template and broach.     The synovium was excised from around the patella, after which the patella thickness was measured with calipers.  The patella was then prepared if the native thickness allowed resurfacing.  The appropriate sized patellar template was seated and the three fixation holes were created with a drill.      A bone plug was then shaped and place in the opening created for the intramedullary femoral guide.    The trial components were then placed and the knee was found to have proper alignment and was stable through a full range of flexion and extension. The trial components were removed and the bony surfaces were cleaned with pulsatile lavage and an antibiotic solution. The bone was then dried and the permanent components were cemented in a sequential fashion. The tibial component was cemented first.  Set in the proper position and rotation.  The tibial component was impacted into place, it was fully seated and excess cemented was removed.  The femoral component was then cemented in place followed by the patella. Excess cement was removed.     Once the cement was hardened the tibial articular surface was implanted.  The knee was then reduced and found to have good flexion, full extension with good stability and proper alignment. The patella tracked central.    A dilute (0.35%) Betadine lavage was placed in the arthrotomy site to soak the components for 3 minutes prior to wound closure. The solution was made by adding 17.5 mL of 10% povidone-iodine in 500 mL of normal saline, resulting in a 0.35% dilution. This solution was then removed from the knee and the knee was copiously irrigated.    The tourniquet was then released and hemostasis was obtained with electrocautery. The knee irrigated with pulsatile lavage and antibiotic solution followed by normal saline.     The arthrotomy was closed with # 0 Vicryl interrupted sutures and #1 Stratafix barbed suture. The subcutaneous tissue was closed in layers with # 0 and # 2-0 Stratafix barbed sutures. The skin was closed with 3-0 Stratafix barbed sutures and Dermabond. A sterile compressive dressing was applied. The patient tolerated the procedure well, without complication, and was transferred to the recovery room in a stable condition. The sponge and instrument count were correct.      A surgical first assistant was required and necessary for the completion of this case to aid in manipulation and positioning of the extremity while the surgeon operated.  Their assistance was vital to the safety and success of the procedure.     Deep venous thrombosis prophylaxis will consist of home Xarelto according to CHEST guidelines.             ____________________________  Kadeem Hendricks M.D.

## 2025-07-10 NOTE — PLAN OF CARE
Goal Outcome Evaluation:  Plan of Care Reviewed With: patient, spouse           Outcome Evaluation: Physical therapy evaluation complete.  Patient performs supine to/from sit with SBA and sit to/from stand with CGA.  Patient performs gait with rolling walker x50 feet, CGA with cues for increased step length and proper distance from walker.  Patient will benefit from physical therapy to address deficits in functional mobility, strength and ROM.  Plan to see x1-2 additional visits prior ro return home.  Recommend home health PT at discharge.    Anticipated Discharge Disposition (PT): home with home health

## 2025-07-10 NOTE — CASE MANAGEMENT/SOCIAL WORK
Discharge Planning Assessment   [facility]     Patient Name: Prema Hawthorne  MRN: [id number]  Today's Date: 7/10/2025    Admit Date: 7/10/2025    Plan: Home with  and [facility]  for PT   Discharge Needs Assessment       Row Name 07/10/25 1515       Living Environment    People in Home spouse    Name(s) of People in Home shy Alcaraz    Current Living Arrangements condominium    Duration at Residence 18 Y    Potentially Unsafe Housing Conditions none    In the past 12 months has the electric, gas, oil, or water company threatened to shut off services in your home? No    Primary Care Provided by self    Provides Primary Care For no one    Caregiving Concerns pt voiced no care giving concerns at this time.    Family Caregiver if Needed spouse    Family Caregiver Names Kieran,     Quality of Family Relationships helpful;involved;supportive    Able to Return to Prior Arrangements yes    Living Arrangement Comments Patient states she lives with her  in a single story house with no steps to gain entry.       Resource/Environmental Concerns    Resource/Environmental Concerns none    Transportation Concerns none       Transportation Needs    In the past 12 months, has lack of transportation kept you from medical appointments or from getting medications? no    In the past 12 months, has lack of transportation kept you from meetings, work, or from getting things needed for daily living? No       Food Insecurity    Within the past 12 months, you worried that your food would run out before you got the money to buy more. Never true    Within the past 12 months, the food you bought just didn't last and you didn't have money to get more. Never true       Transition Planning    Patient/Family Anticipates Transition to home with family;home with help/services    Patient/Family Anticipated Services at Transition ;home health care    Transportation Anticipated family or friend will provide  pt states  Patti Gowers will be able to provide ride at discharge       Discharge Needs Assessment    Readmission Within the Last 30 Days no previous admission in last 30 days    Current Outpatient/Agency/Support Group --  none    Equipment Currently Used at Home cane, quad;cane, straight;commode;walker, rolling;rollator    Concerns to be Addressed adjustment to diagnosis/illness;discharge planning    Concerns Comments pt will need HH for PT at discharge    Anticipated Changes Related to Illness none    Equipment Needed After Discharge none    Outpatient/Agency/Support Group Needs homecare agency    Discharge Facility/Level of Care Needs home with home health    Patient's Choice of Community Agency(s) [facility] will be following in the home for PT    Current Discharge Risk --  none    Discharge Coordination/Progress Patient states she plans on returning home at discharge with her  and friends to help as needed and HH for PT.                   Discharge Plan       Row Name 07/10/25 1534       Plan    Plan Home with  and [facility] for PT    Patient/Family in Agreement with Plan yes    Plan Comments 3:00 pm, into room and introduced self and role of CM. Discussed discharge disposition with patient and her  with permission. Patient is currently resting in bed and voiced no complaints. Patient confirms the info on her face sheet is correct and she see's MARCELINO Chandler as PCP. She states she normally uses [facility] home delivery or [facility] [address] in [place] and currently has no problem picking up or paying for her meds. She also states she does have a living will but it is not on file here. Patient states she lives with her  in a single story house with no steps to gain entry and normally has no issues entering the home or maneuvering inside using a quad cane for mobility. She states she is independent with her ADLs, no longer work and still drives and her friend Rosalina will be  able to provide ride home at discharge. She also states she has quad cane, straight cane, rolling walker, BSC, rollator at home and does not anticipate needing any other equipment at discharge. Patient will be followed by [facility] and she verbalized understanding and is agreeable to this service. Patient states she plans on returning home at discharge with her  to help as needed and HH for PT. She had no other questions. CM will follow.                  Continued Care and Services - Admitted Since 7/10/2025       Home Medical Care       Service Provider Request Status Services Address Phone Fax Patient Preferred    [facility]  Selected Home Health Services [address] [place] [id number] [phone] --                     Demographic Summary    No documentation.                  Functional Status    No documentation.                  Psychosocial    No documentation.                  Abuse/Neglect    No documentation.                  Legal    No documentation.                  Substance Abuse    No documentation.                  Patient Forms    No documentation.                     Zora Christensen RN

## 2025-07-10 NOTE — ANESTHESIA POSTPROCEDURE EVALUATION
Patient: Prema Hawthorne    Procedure Summary       Date: 07/10/25 Room / Location:  [facility] OR 3 /  [facility] OR    Anesthesia Start: 0739 Anesthesia Stop: 0915    Procedure: LEFT TOTAL KNEE REPLACEMENT (Left: Knee) Diagnosis: (M17.12)    Surgeons: Kadeem Hendricks MD Provider: Denis Amaya CRNA    Anesthesia Type: MAC, regional, spinal ASA Status: 3            Anesthesia Type: MAC, regional, spinal    Vitals  Vitals Value Taken Time   BP 77/56 07/10/25 10:35   Temp 97.6 °F (36.4 °C) 07/10/25 09:15   Pulse 64 07/10/25 10:36   Resp 22 07/10/25 10:30   SpO2 97 % 07/10/25 10:36   Vitals shown include unfiled device data.        Post Anesthesia Care and Evaluation    Patient location during evaluation: PACU  Patient participation: complete - patient participated  Level of consciousness: awake and alert  Pain score: 2  Pain management: adequate    Airway patency: patent  Anesthetic complications: No anesthetic complications  PONV Status: none  Cardiovascular status: acceptable and hypotensive  Respiratory status: acceptable  Hydration status: acceptable    Comments: Patient has baseline BP of 94/53. SBP been anywhere from 75-85. Patient asymptomatic. Awake and talking. Starting to move BLE. 1500cc of LR given intra-op and postop. Okay to discharge. Patient recovered for 90 minutes.

## 2025-07-10 NOTE — ANESTHESIA PROCEDURE NOTES
Peripheral Block      Patient reassessed immediately prior to procedure    Patient location during procedure: pre-op  Start time: 7/10/2025 7:11 AM  Stop time: 7/10/2025 7:13 AM  Reason for block: at surgeon's request and post-op pain management  Performed by  CRNA/CAA: Denis Amaya CRNASRNA: Steven Vázquez SRNA  Preanesthetic Checklist  Completed: patient identified, IV checked, site marked, risks and benefits discussed, surgical consent, monitors and equipment checked, pre-op evaluation and timeout performed  Prep:  Pt Position: supine  Sterile barriers:cap, gloves, mask and washed/disinfected hands  Prep: ChloraPrep  Patient monitoring: blood pressure monitoring, continuous pulse oximetry and EKG  Procedure    Sedation: yes  Performed under: local infiltration  Guidance:ultrasound guided    ULTRASOUND INTERPRETATION.  Using ultrasound guidance a 21 G gauge needle was placed in close proximity to the nerve, at which point, under ultrasound guidance anesthetic was injected in the area of the nerve and spread of the anesthesia was seen on ultrasound in close proximity thereto.  There were no abnormalities seen on ultrasound; a digital image was taken; and the patient tolerated the procedure with no complications. Images:still images obtained, printed/placed on chart    Laterality:left  Block Type:adductor canal block  Injection Technique:single-shot  Needle Type:echogenic  Needle Gauge:21 G  Resistance on Injection: none    Medications Used: dexmedetomidine HCl (PRECEDEX) injection - Perineural   30 mcg - 7/10/2025 7:13:00 AM  bupivacaine PF (MARCAINE) injection 0.25% - Perineural   20 mL - 7/10/2025 7:13:00 AM      Post Assessment  Injection Assessment: negative aspiration for heme, no paresthesia on injection and incremental injection  Patient Tolerance:comfortable throughout block  Complications:no  Performed by: Steven Vázquez SRNA

## 2025-07-10 NOTE — ANESTHESIA PROCEDURE NOTES
Spinal Block      Patient reassessed immediately prior to procedure    Start Time: 7/10/2025 7:26 AM  Stop Time: 7/10/2025 7:35 AM  Indication:at surgeon's request and post-op pain management  Performed By  CRNA/KASSIDY: Denis Amaya CRNASRNA: Steven Vázquez SRNA  Preanesthetic Checklist  Completed: patient identified, IV checked, site marked, risks and benefits discussed, surgical consent, monitors and equipment checked, pre-op evaluation and timeout performed  Spinal Block Prep:  Patient Position:sitting  Sterile Tech:cap, gloves, mask and sterile barriers  Prep:Chloraprep  Patient Monitoring:blood pressure monitoring, continuous pulse oximetry and EKG    Spinal Block Procedure  Approach:midline  Guidance:landmark technique and palpation technique  Location:L3-L4  Needle Type:Sprotte  Needle Gauge:25 G  Placement of Spinal needle event:cerebrospinal fluid aspirated  Paresthesia: no  Fluid Appearance:clear  Medications: bupivacaine (MARCAINE) injection 0.5% - Injection   1.8 mL - 7/10/2025 7:35:00 AM   Post Assessment  Patient Tolerance:patient tolerated the procedure well with no apparent complications  Complications no

## 2025-07-10 NOTE — THERAPY EVALUATION
Patient Name: Prema Hawthorne  : 1945    MRN: [id number]                              Today's Date: 7/10/2025       Admit Date: 7/10/2025    Visit Dx:     ICD-10-CM ICD-9-CM   1. Arthritis of left knee  M17.12 716.96     Patient Active Problem List   Diagnosis    Permanent atrial fibrillation    Hypertension    Morbid obesity    DAVID on CPAP    Ventricular premature beats    Sleep related hypoxia    Hyperlipidemia    Vitamin D deficiency    Post-menopausal bleeding    Arthritis of right knee    Arthritis of left knee     Past Medical History:   Diagnosis Date    Arrhythmia 3/2011    Arthritis     Atrial fibrillation     with rapid ventricular response (CHRONIC)    Cancer 2021    Uterine cancer detected    Cardiomyopathy     nonischemic, Dr Mejia follows, clearance in notes    Essential hypertension     Hyperlipidemia     Knee pain, bilateral     GETS INJECTIONS EVERY 3 MONTHS    Morbid obesity     On anticoagulant therapy     xarelto    DAVID (obstructive sleep apnea)     CPAP nightly    PONV (postoperative nausea and vomiting)     Postmenopausal bleeding     PVC (premature ventricular contraction) 2016    Shortness of breath     EXERTION    Systolic heart failure     Urinary incontinence      Past Surgical History:   Procedure Laterality Date    BLADDER SURGERY      CARDIAC CATHETERIZATION  2011    CATARACT EXTRACTION, BILATERAL      COLONOSCOPY      D & C HYSTEROSCOPY N/A 2021    Procedure: HYSTEROSCOPY WITH  DILATATION AND CURETTAGE, POLYPECTOMY WITH MYOSURE;  Surgeon: Christiane Goldman MD;  [facility];  Service: Obstetrics/Gynecology;  Laterality: N/A;    HYSTERECTOMY  2021    TOTAL KNEE ARTHROPLASTY Right 2024    Procedure: TOTAL KNEE ARTHROPLASTY;  Surgeon: Kadeem Hendricks MD;  Location: [facility];  Service: Orthopedics;  Laterality: Right;      General Information       Row Name 07/10/25 1302          Physical Therapy Time and Intention    Document Type  evaluation  -     Mode of Treatment physical therapy  -       Row Name 07/10/25 1302          General Information    Patient Profile Reviewed yes  pt s/p left TKA, WBAT  -     Prior Level of Function independent:;all household mobility;community mobility  -     Existing Precautions/Restrictions fall  -     Barriers to Rehab none identified  -       Row Name 07/10/25 1302          Living Environment    Current Living Arrangements home  patio home  -     People in Home spouse  -       Row Name 07/10/25 1302          Home Main Entrance    Number of Stairs, Main Entrance none  -       Row Name 07/10/25 1302          Cognition    Orientation Status (Cognition) oriented x 3  -               User Key  (r) = Recorded By, (t) = Taken By, (c) = Cosigned By      Initials Name Provider Type    Ivis Ferrera, PT Physical Therapist                   Mobility       Row Name 07/10/25 1302          Bed Mobility    Bed Mobility supine-sit;sit-supine  -     Supine-Sit [place] (Bed Mobility) supervision  -     Sit-Supine [place] (Bed Mobility) supervision  -     Assistive Device (Bed Mobility) bed rails;head of bed elevated  -       Row Name 07/10/25 1302          Sit-Stand Transfer    Sit-Stand [place] (Transfers) contact guard;verbal cues  -     Assistive Device (Sit-Stand Transfers) walker, front-wheeled  -       Row Name 07/10/25 1302          Gait/Stairs (Locomotion)    [place] Level (Gait) contact guard;verbal cues  -     Assistive Device (Gait) walker, front-wheeled  -     Patient was able to Ambulate yes  -     Distance in Feet (Gait) 50  -     Deviations/Abnormal Patterns (Gait) steve decreased  -     Bilateral Gait Deviations forward flexed posture  -     Comment, (Gait/Stairs) pt manages direction changes without balance loss  -       Row Name 07/10/25 1302          Mobility    Extremity Weight-bearing Status left lower extremity  -     Left Lower  Extremity (Weight-bearing Status) weight-bearing as tolerated (WBAT)  -               User Key  (r) = Recorded By, (t) = Taken By, (c) = Cosigned By      Initials Name Provider Type    Ivis Ferrera, PT Physical Therapist                   Obj/Interventions       [facility] Name 07/10/25 [facility]          Range of Motion Comprehensive    Comment, General Range of Motion right LE WFL, left LE not formally tested  -[facility] 07/10/25 [facility]          Strength Comprehensive (MMT)    Comment, General Manual Muscle Testing (MMT) Assessment right LE WFL, left LE not formally tested  -[facility] 07/10/25 [facility]          Balance    Comment, Balance CGA for standing balance with walker  -               User Key  (r) = Recorded By, (t) = Taken By, (c) = Cosigned By      Initials Name Provider Type    Ivis Ferrera, PT Physical Therapist                   Goals/Plan       Row Name 07/10/25 1302          Bed Mobility Goal 1 (PT)    Activity/Assistive Device (Bed Mobility Goal 1, PT) bed mobility activities, all  -JW     [place] Level/Cues Needed (Bed Mobility Goal 1, PT) modified independence  -JW     Time Frame (Bed Mobility Goal 1, PT) 1 day  -JW     Progress/Outcomes (Bed Mobility Goal 1, PT) new goal  -[facility] Name 07/10/25 1302          Transfer Goal 1 (PT)    Activity/Assistive Device (Transfer Goal 1, PT) transfers, all  -JW     [place] Level/Cues Needed (Transfer Goal 1, PT) supervision required  -JW     Time Frame (Transfer Goal 1, PT) 1 day  -JW     Progress/Outcome (Transfer Goal 1, PT) new goal  -JW       Row Name 07/10/25 1302          Gait Training Goal 1 (PT)    Activity/Assistive Device (Gait Training Goal 1, PT) gait (walking locomotion);assistive device use  -JW     [place] Level (Gait Training Goal 1, PT) supervision required  -JW     Distance (Gait Training Goal 1, PT) 100  -JW     Time Frame (Gait Training Goal 1, PT) 1 day  -JW     Progress/Outcome (Gait Training Goal 1,  "PT) new goal  -       Row Name 07/10/25 1302          Therapy Assessment/Plan (PT)    Planned Therapy Interventions (PT) balance training;bed mobility training;gait training;home exercise program;patient/family education;strengthening;transfer training;ROM (range of motion)  -               User Key  (r) = Recorded By, (t) = Taken By, (c) = Cosigned By      Initials Name Provider Type    Ivis Ferrera, PT Physical Therapist                   Clinical Impression       Row Name 07/10/25 1302          Pain    Pretreatment Pain Rating 0/10 - no pain  -     Posttreatment Pain Rating 0/10 - no pain  -       Row Name 07/10/25 1302          Plan of Care Review    Plan of Care Reviewed With patient;spouse  -     Outcome Evaluation Physical therapy evaluation complete.  Patient performs supine to/from sit with SBA and sit to/from stand with CGA.  Patient performs gait with rolling walker x50 feet, CGA with cues for increased step length and proper distance from walker.  Patient will benefit from physical therapy to address deficits in functional mobility, strength and ROM.  Plan to see x1-2 additional visits prior ro return home.  Recommend home health PT at discharge.  -       Row Name 07/10/25 1302          Therapy Assessment/Plan (PT)    Patient/Family Therapy Goals Statement (PT) \"go home\"  -     Rehab Potential (PT) good  -     Criteria for Skilled Interventions Met (PT) yes;meets criteria  -     Therapy Frequency (PT) other (see comments)  1-2 visits  -     Predicted Duration of Therapy Intervention (PT) 1 day  -       Row Name 07/10/25 1302          Positioning and Restraints    Pre-Treatment Position in bed  -     Post Treatment Position bed  -JW     In Bed notified nsg;supine;call light within reach;encouraged to call for assist;with family/caregiver  -               User Key  (r) = Recorded By, (t) = Taken By, (c) = Cosigned By      Initials Name Provider Type    Ivis Ferrera, PT " Physical Therapist                   Outcome Measures       Row Name 07/10/25 1302          How much help from another person do you currently need...    Turning from your back to your side while in flat bed without using bedrails? 3  -JW     Moving from lying on back to sitting on the side of a flat bed without bedrails? 3  -JW     Moving to and from a bed to a chair (including a wheelchair)? 3  -JW     Standing up from a chair using your arms (e.g., wheelchair, bedside chair)? 3  -JW     Climbing 3-5 steps with a railing? 3  -JW     To walk in hospital room? 3  -JW     AM-PAC 6 Clicks Score (PT) 18  -JW     Highest Level of Mobility Goal Walk 10 Steps or More-6  -       Row Name 07/10/25 1302          Functional Assessment    Outcome Measure Options AM-PAC 6 Clicks Basic Mobility (PT)  -               User Key  (r) = Recorded By, (t) = Taken By, (c) = Cosigned By      Initials Name Provider Type    Ivis Ferrera, MARY Physical Therapist                                 Physical Therapy Education       Title: PT OT SLP Therapies (In Progress)       Topic: Physical Therapy (In Progress)       Point: Mobility training (Done)       Learning Progress Summary            Patient Acceptance, E,TB, VU by  at 7/10/2025 1402                      Point: Home exercise program (Not Started)       Learner Progress:  Not documented in this visit.                              User Key       Initials Effective Dates Name Provider Type Discipline     06/16/21 -  Ivis Sharp, MARY Physical Therapist PT                  PT Recommendation and Plan  Planned Therapy Interventions (PT): balance training, bed mobility training, gait training, home exercise program, patient/family education, strengthening, transfer training, ROM (range of motion)  Outcome Evaluation: Physical therapy evaluation complete.  Patient performs supine to/from sit with SBA and sit to/from stand with CGA.  Patient performs gait with rolling walker x50  feet, CGA with cues for increased step length and proper distance from walker.  Patient will benefit from physical therapy to address deficits in functional mobility, strength and ROM.  Plan to see x1-2 additional visits prior ro return home.  Recommend home health PT at discharge.     Time Calculation:   PT Evaluation Complexity  History, PT Evaluation Complexity: 1-2 personal factors and/or comorbidities  Examination of Body Systems (PT Eval Complexity): 1-2 elements  Clinical Presentation (PT Evaluation Complexity): stable  Clinical Decision Making (PT Evaluation Complexity): low complexity  Overall Complexity (PT Evaluation Complexity): low complexity     PT Charges       Row Name 07/10/25 1402             Time Calculation    Start Time 1302  -      Stop Time 1330  -      Time Calculation (min) 28 min  -      PT Received On 07/10/25  -[place]      PT - Next Appointment 07/11/25  -[place]                User Key  (r) = Recorded By, (t) = Taken By, (c) = Cosigned By      Initials Name Provider Type    Ivis Ferrera, PT Physical Therapist                  Therapy Charges for Today       Code Description Service Date Service Provider Modifiers Qty    [id number]  PT EVAL LOW COMPLEXITY 2 7/10/2025 Ivis Sharp, PT GP 1            PT G-Codes  Outcome Measure Options: AM-PAC 6 Clicks Basic Mobility (PT)  AM-PAC 6 Clicks Score (PT): 18  PT Discharge Summary  Anticipated Discharge Disposition (PT): home with home health    Ivis Sharp, MARY  7/10/2025

## 2025-07-10 NOTE — H&P
daPatient ID: Prema Hawthorne     Chief Complaint:    Painful left knee    HPI:    Prema Hawthorne is a 79 y.o. year old patient with end stage arthritis of the left knee.  Conservative treatment has failed.  Here today for elective total knee arthroplasty.    Past Medical History:   Diagnosis Date    Arrhythmia 3/2011    Arthritis     Atrial fibrillation     with rapid ventricular response (CHRONIC)    Cancer 03/2021    Uterine cancer detected    Cardiomyopathy     nonischemic, Dr Mejia follows, clearance in notes    Essential hypertension     Hyperlipidemia     Knee pain, bilateral     GETS INJECTIONS EVERY 3 MONTHS    Morbid obesity     On anticoagulant therapy     xarelto    DAVID (obstructive sleep apnea) 2011    CPAP nightly    PONV (postoperative nausea and vomiting)     Postmenopausal bleeding     PVC (premature ventricular contraction) 03/25/2016    Shortness of breath     EXERTION    Systolic heart failure     Urinary incontinence      Past Surgical History:   Procedure Laterality Date    BLADDER SURGERY      CARDIAC CATHETERIZATION  03/2011    CATARACT EXTRACTION, BILATERAL      COLONOSCOPY      D & C HYSTEROSCOPY N/A 03/24/2021    Procedure: HYSTEROSCOPY WITH  DILATATION AND CURETTAGE, POLYPECTOMY WITH MYOSURE;  Surgeon: Christiane Goldman MD;  [place] [facility] [place];  Service: Obstetrics/Gynecology;  Laterality: N/A;    HYSTERECTOMY  05/2021    TOTAL KNEE ARTHROPLASTY Right 11/14/2024    Procedure: TOTAL KNEE ARTHROPLASTY;  Surgeon: Kadeem Hendricks MD;  Location: [facility] OR;  Service: Orthopedics;  Laterality: Right;     Current Facility-Administered Medications   Medication Dose Route Frequency Provider Last Rate Last Admin    ceFAZolin 2000 mg IVPB in 100 mL NS (VTB)  2,000 mg Intravenous Once Kadeem Hendricks MD        ethyl alcohol 62 % 2 each  2 Swab Nasal Once Kadeem Hendricks MD        lactated ringers infusion  9 mL/hr Intravenous Continuous Edith Horta CRNA 9 mL/hr at  07/10/25 0626 9 mL/hr at 07/10/25 0626    lidocaine PF 1% (XYLOCAINE) injection 0.5 mL  0.5 mL Injection Once PRN Edith Horta CRNA        meloxicam (MOBIC) tablet 7.5 mg  7.5 mg Oral Daily Kadeem Hendricks MD        Midazolam HCl (PF) (VERSED) injection 0.5 mg  0.5 mg Intravenous Q10 Min PRN Renetta Marin MD   0.5 mg at 07/10/25 0706    sodium chloride 0.9 % flush 10 mL  10 mL Intravenous Q12H Edith Horta CRNA        sodium chloride 0.9 % flush 10 mL  10 mL Intravenous PRN Edith Horta CRNA        sodium chloride 0.9 % infusion 40 mL  40 mL Intravenous PRN Edith Horta CRNA        tranexamic acid 1000 mg in 100 mL 0.7% NaCl infusion (premix)  1,000 mg Intravenous Once Kadeem Hendricks MD         Social History     Tobacco Use    Smoking status: Never     Passive exposure: Never    Smokeless tobacco: Never   Substance Use Topics    Alcohol use: Yes     Comment: occasionally / wine     Family History   Problem Relation Age of Onset    Diabetes Mother     Heart disease Father     Hypertension Father     Other Father         heart problems    Heart disease Brother     Heart disease Brother     Hypertension Brother     Malig Hyperthermia Neg Hx        Allergies   Allergen Reactions    Codeine Nausea And Vomiting     Immunization History   Administered Date(s) Administered    COVID-19 (PFIZER) Purple Cap Monovalent 02/17/2021, 03/10/2021, 12/18/2021    Covid-19 (Pfizer) Gray Cap Monovalent 02/17/2021, 03/10/2021    FLUAD TRI 65YR+ 09/09/2019    Fluad Quad 65+ 09/18/2020, 11/01/2022, 11/27/2023    Fluzone  >6mos 09/18/2020    Fluzone High-Dose 65+YRS 11/15/2023    Fluzone High-Dose 65+yrs 10/14/2021    Influenza Seasonal Injectable 10/01/2022, 11/01/2023    Influenza, Unspecified 09/28/2018    Pneumococcal Conjugate 13-Valent (PCV13) 11/12/2020    Pneumococcal Polysaccharide (PPSV23) 11/29/2021    Shingrix 09/01/2021, 11/12/2021     Vitals:    07/10/25 0626   BP: 110/58   Pulse: 74    Resp: 20   Temp: 97.6 °F (36.4 °C)   SpO2: 96%         ROS:    All other pertinent positives and negatives as noted above in HPI.    Physical Exam:     Alert at time of exam  Emotional Behavior - stable and appropriate  Gen- healthy appearing, in no acute distress  HEENT- Normocephalic, atraumatic  Extremity- no gross deformity, see office notes, no changes  Neuro- motor and sensory grossly intact, moving all extremities  Pulses- Present bilateral lower extremities, toes pink, BCR    Painful and restricted rom - left knee      Diagnostic Studies:     End stage arthritis of left knee.      Assessment:     left knee arthritis    Plan:      Elective left TKA today      CONSENT TO PROCEDURE/SEDATION  *  Information provided regarding procedure: Yes   *  Risk/Benefits Discussed: Yes   *  Alternative /Recovery Discussed: Yes   *  Need for Blood Discussed: Yes   *  Patient /Parent Consents to Planned Procedure/Sedation and accurately recounts discussion: Yes     The spectrum of treatment options were discussed with the patient in detail including both the nonoperative and operative treatment modalities and their respective risks and benefits.  After thorough discussion, the patient has elected to undergo surgical treatment.  The details of the surgical procedure were explained including the location of probable incisions and a description of the likely implants to be used.  Models and diagrams were used as educational resources. The patient understands the likely convalescence after surgery, as well as the rehabilitation required.  We thoroughly discussed the risks, benefits, and alternatives to surgery.  The risks include but are not limited to the risk of infection, joint stiffness, blood clots (including DVT and/or pulmonary embolus along with the risk of death), neurologic and/or vascular injury, fracture, dislocation, nonunion, malunion, need for further surgery including hardware failure requiring revision, and  continued pain.  It was explained that if tissue has been repaired or reconstructed, there is also a chance of failure which may require further management.  In addition, we have discussed the risks, including the risk of disease transmission, associated with any allograft tissue which may be utilized.  Following the completion of the discussion, the patient expressed understanding of this planned course of care, all their questions were answered and consent will be obtained preoperatively.

## 2025-07-10 NOTE — ANESTHESIA PROCEDURE NOTES
Peripheral Block      Patient reassessed immediately prior to procedure    Patient location during procedure: pre-op  Start time: 7/10/2025 7:15 AM  Stop time: 7/10/2025 7:19 AM  Reason for block: at surgeon's request and post-op pain management  Performed by  CRNA/CAA: Denis Amaya CRNASRNA: Steven Vázquez SRNA  Preanesthetic Checklist  Completed: patient identified, IV checked, site marked, risks and benefits discussed, surgical consent, monitors and equipment checked, pre-op evaluation and timeout performed  Prep:  Pt Position: supine  Sterile barriers:cap, gloves, mask and washed/disinfected hands  Prep: ChloraPrep  Patient monitoring: blood pressure monitoring, continuous pulse oximetry and EKG  Procedure    Sedation: yes  Performed under: local infiltration  Guidance:ultrasound guided    ULTRASOUND INTERPRETATION.  Using ultrasound guidance a 21 G gauge needle was placed in close proximity to the nerve, at which point, under ultrasound guidance anesthetic was injected in the area of the nerve and spread of the anesthesia was seen on ultrasound in close proximity thereto.  There were no abnormalities seen on ultrasound; a digital image was taken; and the patient tolerated the procedure with no complications. Images:still images obtained, printed/placed on chart    Laterality:left  Block Type:iPack  Injection Technique:single-shot  Needle Type:echogenic  Needle Gauge:21 G  Resistance on Injection: none    Medications Used: dexmedetomidine HCl (PRECEDEX) injection - Perineural   20 mcg - 7/10/2025 7:19:00 AM  bupivacaine PF (MARCAINE) injection 0.25% - Perineural   15 mL - 7/10/2025 7:19:00 AM      Post Assessment  Injection Assessment: negative aspiration for heme, no paresthesia on injection and incremental injection  Patient Tolerance:comfortable throughout block  Complications:no  Performed by: Steven Vázquez SRNA

## 2025-07-10 NOTE — ANESTHESIA PROCEDURE NOTES
Peripheral Block      Patient reassessed immediately prior to procedure    Patient location during procedure: pre-op  Start time: 7/10/2025 7:20 AM  Stop time: 7/10/2025 7:21 AM  Reason for block: at surgeon's request and post-op pain management  Performed by  CRNA/CAA: Denis Amaya CRNASRNA: Steven Vázquez SRNA  Preanesthetic Checklist  Completed: patient identified, IV checked, site marked, risks and benefits discussed, surgical consent, monitors and equipment checked, pre-op evaluation and timeout performed  Prep:  Pt Position: supine  Sterile barriers:cap, gloves, mask and washed/disinfected hands  Prep: ChloraPrep  Patient monitoring: blood pressure monitoring, continuous pulse oximetry and EKG  Procedure    Sedation: yes  Performed under: local infiltration  Guidance:ultrasound guided    ULTRASOUND INTERPRETATION.  Using ultrasound guidance a 21 G gauge needle was placed in close proximity to the nerve, at which point, under ultrasound guidance anesthetic was injected in the area of the nerve and spread of the anesthesia was seen on ultrasound in close proximity thereto.  There were no abnormalities seen on ultrasound; a digital image was taken; and the patient tolerated the procedure with no complications. Images:still images obtained, printed/placed on chart    Laterality:left  Block Type:DOROTEO  Injection Technique:single-shot  Needle Type:echogenic  Needle Gauge:21 G  Resistance on Injection: none    Medications Used: bupivacaine PF (MARCAINE) injection 0.25% - Injection   15 mL - 7/10/2025 7:21:00 AM      Post Assessment  Injection Assessment: negative aspiration for heme, no paresthesia on injection and incremental injection  Patient Tolerance:comfortable throughout block  Complications:no  Performed by: Steven Vázquez SRNA

## 2025-07-10 NOTE — ANESTHESIA PREPROCEDURE EVALUATION
Anesthesia Evaluation     Patient summary reviewed and Nursing notes reviewed   no history of anesthetic complications:   NPO Solid Status: > 8 hours  NPO Liquid Status: > 2 hours           Airway   Mallampati: II  TM distance: >3 FB  Neck ROM: full  No difficulty expected  Dental - normal exam     Pulmonary - normal exam    breath sounds clear to auscultation  (+) ,sleep apnea on CPAP  (-) shortness of breath (pt. denies)  Cardiovascular   Exercise tolerance: good (4-7 METS)    ECG reviewed  PT is on anticoagulation therapy  Rhythm: irregular  Rate: abnormal    (+) hypertension well controlled, dysrhythmias Atrial Fib, hyperlipidemia    PE comment: A-fib      Neuro/Psych- negative ROS  GI/Hepatic/Renal/Endo    (+) morbid obesity    Musculoskeletal     Abdominal   (+) obese    Abdomen: soft.  Bowel sounds: normal.   Substance History - negative use     OB/GYN negative ob/gyn ROS         Other   arthritis,     (-) history of cancer (had a robotic hysterectomy previously for uterine cancer)                    Anesthesia Plan    ASA 3     MAC, regional and spinal     intravenous induction     Anesthetic plan, risks, benefits, and alternatives have been provided, discussed and informed consent has been obtained with: patient.  Pre-procedure education provided  Use of blood products discussed with patient  Consented to blood products.    Plan discussed with CRNA.        CODE STATUS:

## 2025-07-11 VITALS
HEART RATE: 61 BPM | HEIGHT: 63 IN | BODY MASS INDEX: 45.54 KG/M2 | TEMPERATURE: 97.8 F | OXYGEN SATURATION: 91 % | WEIGHT: 257 LBS | SYSTOLIC BLOOD PRESSURE: 128 MMHG | RESPIRATION RATE: 18 BRPM | DIASTOLIC BLOOD PRESSURE: 64 MMHG

## 2025-07-11 PROCEDURE — 25010000002 CEFAZOLIN PER 500 MG: Performed by: ORTHOPAEDIC SURGERY

## 2025-07-11 PROCEDURE — G0378 HOSPITAL OBSERVATION PER HR: HCPCS

## 2025-07-11 PROCEDURE — 25810000003 SODIUM CHLORIDE 0.9 % SOLUTION: Performed by: HOSPITALIST

## 2025-07-11 PROCEDURE — A9270 NON-COVERED ITEM OR SERVICE: HCPCS | Performed by: ORTHOPAEDIC SURGERY

## 2025-07-11 PROCEDURE — 63710000001 TRAMADOL 50 MG TABLET: Performed by: ORTHOPAEDIC SURGERY

## 2025-07-11 PROCEDURE — 97110 THERAPEUTIC EXERCISES: CPT

## 2025-07-11 PROCEDURE — A9270 NON-COVERED ITEM OR SERVICE: HCPCS | Performed by: HOSPITALIST

## 2025-07-11 PROCEDURE — 97165 OT EVAL LOW COMPLEX 30 MIN: CPT

## 2025-07-11 PROCEDURE — 97116 GAIT TRAINING THERAPY: CPT

## 2025-07-11 PROCEDURE — 63710000001 DOCUSATE SODIUM 100 MG CAPSULE: Performed by: ORTHOPAEDIC SURGERY

## 2025-07-11 PROCEDURE — 63710000001 PHENOL 1.4 % LIQUID 177 ML BOTTLE: Performed by: HOSPITALIST

## 2025-07-11 RX ADMIN — PHENOL 1 SPRAY: 1.5 LIQUID ORAL at 01:15

## 2025-07-11 RX ADMIN — SODIUM CHLORIDE 125 ML/HR: 9 INJECTION, SOLUTION INTRAVENOUS at 06:02

## 2025-07-11 RX ADMIN — TRAMADOL HYDROCHLORIDE 50 MG: 50 TABLET, COATED ORAL at 01:32

## 2025-07-11 RX ADMIN — DOCUSATE SODIUM 100 MG: 100 CAPSULE, LIQUID FILLED ORAL at 01:16

## 2025-07-11 RX ADMIN — SODIUM CHLORIDE 2 G: 9 INJECTION, SOLUTION INTRAVENOUS at 01:17

## 2025-07-11 NOTE — PLAN OF CARE
Goal Outcome Evaluation:  Plan of Care Reviewed With: patient           Outcome Evaluation: Admitted from PACU r/t L TKA. L TKA pamela dressing C/D/I. Mild pain treated. Ambulating stand by assist with walker. Blood pressure has been soft today, a couple bolus given today.

## 2025-07-11 NOTE — DISCHARGE SUMMARY
Outpatient Surgery Discharge Summary    Patient: Prema Hawthorne  Medical Record #: [id number]  Age: 79 y.o.  : 1945    Admit date: 7/10/2025    Discharge date: 2025    Attending physician: Kadeem Hendricks MD    Admission diagnosis: Arthritis of left knee [M17.12]    Discharge diagnosis: Arthritis of left knee [M17.12]    Procedure performed: Left Total Knee Arthroplasty    Hospital Course:  Patient was admitted.  Underwent appropriate preoperative testing.  Underwent regional anesthesia and did well in the operating room and throughout the recovery period.   Discharged to rehab in stable condition with PT to start soon.     Admission condition: good    Discharge condition: good    Disposition: home    Meds: medicines reconciled and prescriptions signed on chart    Activity: see discharge instruction sheet, Weight bearing as tolerated.  Range of motion as tolerated.   Diet: see discharge instruction sheet  Wound Care: see discharge instruction sheet    Follow up: Dr. Hendricks in 6 weeks    Signed:  MARCELINO Ruvalcaba  2025  07:36 EDT

## 2025-07-11 NOTE — PLAN OF CARE
Goal Outcome Evaluation:  Plan of Care Reviewed With: patient, spouse           Outcome Evaluation: PT: Patient performs supine to sit with SBA and sit to stand with SBA.  Patient performs gait with rolling walker x 150 feet, SBA.  Patient performs 10 reps of LE HEP, written handout issued.  Patient reports no concerns regarding return home at this time.  No further PT needs in acute care setting, recommend home health PT at discharge.    Anticipated Discharge Disposition (PT): home with home health

## 2025-07-11 NOTE — PLAN OF CARE
Goal Outcome Evaluation:  Plan of Care Reviewed With: patient        Progress: improving  Outcome Evaluation: Post op day #1 left TKA, dressing c/d/i with pamela dressing and ice in place, PRN tramadol effective, ambulating with walker and stand by assist, continues IV fluids, restart xarelto for afib today, safety measures in place, continue plan of care.

## 2025-07-11 NOTE — THERAPY DISCHARGE NOTE
Acute Care - Physical Therapy Treatment Note/Discharge  BREONNA Parmar     Patient Name: Prema Hawthorne  : 1945  MRN: [id number]  Today's Date: 2025                Admit Date: 7/10/2025    Visit Dx:    ICD-10-CM ICD-9-CM   1. Arthritis of left knee  M17.12 716.96   2. Arthritis of right knee  M17.11 716.96     Patient Active Problem List   Diagnosis    Permanent atrial fibrillation    Hypertension    Morbid obesity    DAVID on CPAP    Ventricular premature beats    Sleep related hypoxia    Hyperlipidemia    Vitamin D deficiency    Post-menopausal bleeding    Arthritis of right knee    Arthritis of left knee     Past Medical History:   Diagnosis Date    Arrhythmia 3/2011    Arthritis     Atrial fibrillation     with rapid ventricular response (CHRONIC)    Cancer 2021    Uterine cancer detected    Cardiomyopathy     nonischemic, Dr Mejia follows, clearance in notes    Essential hypertension     Hyperlipidemia     Knee pain, bilateral     GETS INJECTIONS EVERY 3 MONTHS    Morbid obesity     On anticoagulant therapy     xarelto    DAVID (obstructive sleep apnea)     CPAP nightly    PONV (postoperative nausea and vomiting)     Postmenopausal bleeding     PVC (premature ventricular contraction) 2016    Shortness of breath     EXERTION    Systolic heart failure     Urinary incontinence      Past Surgical History:   Procedure Laterality Date    BLADDER SURGERY      CARDIAC CATHETERIZATION  2011    CATARACT EXTRACTION, BILATERAL      COLONOSCOPY      D & C HYSTEROSCOPY N/A 2021    Procedure: HYSTEROSCOPY WITH  DILATATION AND CURETTAGE, POLYPECTOMY WITH MYOSURE;  Surgeon: Christiane Goldman MD;  Location: [facility];  Service: Obstetrics/Gynecology;  Laterality: N/A;    HYSTERECTOMY  2021    TOTAL KNEE ARTHROPLASTY Right 2024    Procedure: TOTAL KNEE ARTHROPLASTY;  Surgeon: Kadeem Hendricks MD;  Location: [facility];  Service: Orthopedics;  Laterality: Right;       PT Assessment  (Last 12 Hours)       PT Evaluation and Treatment       Row Name 07/11/25 0802          Physical Therapy Time and Intention    Subjective Information no complaints  -JW     Document Type discharge treatment  -JW     Mode of Treatment physical therapy  -JW     Patient Effort good  -JW     Symptoms Noted During/After Treatment none  -JW       Row Name 07/11/25 0802          General Information    Patient Observations alert;cooperative;agree to therapy  -JW     Patient/Family/Caregiver Comments/Observations pt supine, agrees to therapy  -JW     Existing Precautions/Restrictions fall  -JW     Barriers to Rehab none identified  -JW       Row Name 07/11/25 0802          Pain    Pretreatment Pain Rating 0/10 - no pain  -JW     Posttreatment Pain Rating 0/10 - no pain  -       Row Name 07/11/25 0802          Cognition    Personal Safety Interventions gait belt;nonskid shoes/slippers when out of bed  -       Row Name 07/11/25 0802          Bed Mobility    Bed Mobility supine-sit  -JW     Supine-Sit Bradford (Bed Mobility) supervision  -     Assistive Device (Bed Mobility) bed rails;head of bed elevated  -       Row Name 07/11/25 0802          Transfers    Transfers sit-stand transfer;stand-sit transfer  -       Row Name 07/11/25 0802          Sit-Stand Transfer    Sit-Stand Bradford (Transfers) standby assist  -     Assistive Device (Sit-Stand Transfers) walker, front-wheeled  -       Row Name 07/11/25 0802          Stand-Sit Transfer    Stand-Sit Bradford (Transfers) standby assist  -     Assistive Device (Stand-Sit Transfers) walker, front-wheeled  -       Row Name 07/11/25 0802          Gait/Stairs (Locomotion)    Bradford Level (Gait) standby assist  -     Assistive Device (Gait) walker, front-wheeled  -     Distance in Feet (Gait) 150  -JW     Pattern (Gait) swing-through  -JW     Deviations/Abnormal Patterns (Gait) steve decreased  -JW     Bilateral Gait Deviations forward flexed  posture  -JW     Comment, (Gait/Stairs) pt manages direction changes without balance loss or difficulty  -JW       Row Name 07/11/25 0802          Balance    Comment, Balance SBA for standing balance with walker  -       Row Name 07/11/25 0838          Motor Skills    Therapeutic Exercise --  pt performs 10 reps of LE HEP, written handout issued  -JW       Row Name             Wound 07/10/25 0800 Left anterior knee Surgical Closed Surgical Incision    Wound - Properties Group Placement Date: 07/10/25  -JF Placement Time: 0800  -JF Side: Left  -JF Orientation: anterior  -JF Location: knee  -JF Primary Wound Type: Surgical  -JF Secondary Wound Type - Surgical: Closed Surgi  -JF Additional Comments: exofin, pamela dressing, ace wrap, ice pack  -JF    Retired Wound - Properties Group Placement Date: 07/10/25  -JF Placement Time: 0800  -JF Side: Left  -JF Orientation: anterior  -JF Location: knee  -JF Additional Comments: exofin, pamela dressing, ace wrap, ice pack  -JF    Retired Wound - Properties Group Placement Date: 07/10/25  -JF Placement Time: 0800  -JF Side: Left  -JF Orientation: anterior  -JF Location: knee  -JF Additional Comments: exofin, pamela dressing, ace wrap, ice pack  -JF    Retired Wound - Properties Group Date first assessed: 07/10/25  -JF Time first assessed: 0800  -JF Side: Left  -JF Location: knee  -JF Additional Comments: exofin, pamela dressing, ace wrap, ice pack  -JF      Row Name 07/11/25 0838          Plan of Care Review    Outcome Evaluation PT: Patient performs supine to sit with SBA and sit to stand with SBA.  Patient performs gait with rolling walker x 150 feet, SBA.  Patient performs 10 reps of LE HEP, written handout issued.  Patient reports no concerns regarding return home at this time.  No further PT needs in acute care setting, recommend home health PT at discharge.  -       Row Name 07/11/25 0838          Positioning and Restraints    Pre-Treatment Position in bed  -     Post  Treatment Position chair  -ALDEN     In Chair reclined;call light within reach;encouraged to call for assist  -ALDEN       Row Name 07/11/25 0838          Progress Summary (PT)    Progress Toward Functional Goals (PT) progress toward functional goals is good;prepare for discharge  -               User Key  (r) = Recorded By, (t) = Taken By, (c) = Cosigned By      Initials Name Provider Type    Vega Karimi, RN Registered Nurse    Ivis Ferrera, PT Physical Therapist                      Physical Therapy Education       Title: PT OT SLP Therapies (Resolved)       Topic: Physical Therapy (Resolved)       Point: Mobility training (Resolved)       Learning Progress Summary            Patient Acceptance, E,TB, VU by ALDEN at 7/11/2025 0904    Acceptance, E,TB, VU by ALDEN at 7/10/2025 1402                      Point: Home exercise program (Resolved)       Learning Progress Summary            Patient Acceptance, E,TB, VU by ALDEN at 7/11/2025 0904                                      User Key       Initials Effective Dates Name Provider Type Discipline     06/16/21 -  Ivis Sharp, PT Physical Therapist PT                    PT Recommendation and Plan  Anticipated Discharge Disposition (PT): home with home health  Planned Therapy Interventions (PT): balance training, bed mobility training, gait training, home exercise program, patient/family education, strengthening, transfer training, ROM (range of motion)  Therapy Frequency (PT): other (see comments) (1-2 visits)  Progress Summary (PT)  Progress Toward Functional Goals (PT): progress toward functional goals is good, prepare for discharge  Outcome Evaluation: PT: Patient performs supine to sit with SBA and sit to stand with SBA.  Patient performs gait with rolling walker x 150 feet, SBA.  Patient performs 10 reps of LE HEP, written handout issued.  Patient reports no concerns regarding return home at this time.  No further PT needs in acute care setting, recommend home  health PT at discharge.     Outcome Measures       Row Name 07/11/25 0802             How much help from another person do you currently need...    Turning from your back to your side while in flat bed without using bedrails? 3  -JW      Moving from lying on back to sitting on the side of a flat bed without bedrails? 3  -JW      Moving to and from a bed to a chair (including a wheelchair)? 3  -JW      Standing up from a chair using your arms (e.g., wheelchair, bedside chair)? 3  -JW      Climbing 3-5 steps with a railing? 3  -JW      To walk in hospital room? 3  -JW      AM-PAC 6 Clicks Score (PT) 18  -JW         PADD    Diagnosis 1  -JW      Gender 1  -JW      Age Group 0  -JW      Gait Distance 1  -JW      Assist Level 2  -JW      Home Support 3  -JW      PADD Score 8  -JW      Patient Preference home with home health  -JW      Prediction by PADD Score directly home (with home health or out-patient rehab)  -         Functional Assessment    Outcome Measure Options AM-PAC 6 Clicks Basic Mobility (PT);PADD  -                User Key  (r) = Recorded By, (t) = Taken By, (c) = Cosigned By      Initials Name Provider Type    Ivis Ferrera PT Physical Therapist                     Time Calculation:    PT Charges       Row Name 07/11/25 0802             Time Calculation    Start Time 0802  -JW      Stop Time 0834  -JW      Time Calculation (min) 32 min  -JW      PT Received On 07/11/25  -JW         Timed Charges    96029 - PT Therapeutic Exercise Minutes 16  -JW      97761 - Gait Training Minutes  16  -JW         Total Minutes    Timed Charges Total Minutes 32  -JW       Total Minutes 32  -JW                User Key  (r) = Recorded By, (t) = Taken By, (c) = Cosigned By      Initials Name Provider Type    Ivis Ferrera PT Physical Therapist                  Therapy Charges for Today       Code Description Service Date Service Provider Modifiers Qty    15995762356 HC PT EVAL LOW COMPLEXITY 2 7/10/2025  Ivis Sharp, PT GP 1    60245353698 HC PT THER PROC EA 15 MIN 7/11/2025 Ivis Sharp, PT GP 1    27626969359 HC GAIT TRAINING EA 15 MIN 7/11/2025 Ivis Sharp, PT GP 1            PT G-Codes  Outcome Measure Options: AM-PAC 6 Clicks Daily Activity (OT)  AM-PAC 6 Clicks Score (PT): 18  AM-PAC 6 Clicks Score (OT): 21    PT Discharge Summary  Anticipated Discharge Disposition (PT): home with home health    Ivis Sharp, PT  7/11/2025

## 2025-07-11 NOTE — PROGRESS NOTES
"Progress Note    Patient: Prema Hawthorne  Medical record #: [id number]    Prema Hawthorne is a 79 y.o.  female who is POD #1 left TKA.  The patient's main complaint today is surgical pain at the operative site.    Hospital Problem List:    Arthritis of left knee      Current Medications:  Scheduled Meds:famotidine, 40 mg, Oral, Daily  [Held by provider] hydroCHLOROthiazide, 25 mg, Oral, QAM  [Held by provider] lisinopril, 10 mg, Oral, Q24H  [Held by provider] metoprolol succinate XL, 100 mg, Oral, BID  rivaroxaban, 20 mg, Oral, Daily With Dinner      Continuous Infusions:lactated ringers, 100 mL/hr, Last Rate: 100 mL/hr (07/10/25 1016)  sodium chloride, 125 mL/hr, Last Rate: 125 mL/hr (07/11/25 0602)      PRN Meds:.  acetaminophen    docusate sodium    HYDROmorphone **AND** naloxone    Morphine **AND** naloxone    nitroglycerin    ondansetron ODT **OR** ondansetron    oxyCODONE    oxyCODONE    phenol    traMADol    Input/Output:    Intake/Output Summary (Last 24 hours) at 7/11/2025 0732  Last data filed at 7/11/2025 0602  Gross per 24 hour   Intake 3123.75 ml   Output 1950 ml   Net 1173.75 ml       Vital signs:  Blood pressure 113/73, pulse 61, temperature 98.4 °F (36.9 °C), resp. rate 16, height 160 cm (63\"), weight 117 kg (257 lb), SpO2 90%, not currently breastfeeding.    Physical Exam:     Left Knee:  Dressing: clean and dry-SUSU  Able to dorsiflex ankle and move toes  Sensation grossly intact to light touch throughout  Distal pulses intact  No calf swelling  Roslyn's sign negative  Compartments soft  No signs or symptoms of DVT or compartment syndrome      Assessment: 79 y.o.  female POD #1 left TKA    Plan:  Hgb  - stable  mobilize with PT, WBAT on operative knee with walker   pain controlled   Xarelto 20mg daily as preop and according to CHEST guidelines    Disposition: plan discharge home this morning  Leave SUSU dressing in place for 14 days then transition to island dressing  Ok to shower with dressing in " place in 48 hours  Appreciate medical team's expertise and help in managing the patient's medical co-morbidities    [facility] conditions to include   Arthritis of left knee        Signed:  MARCELINO Ruvalcaba  7/11/2025  07:32 EDT

## 2025-07-11 NOTE — PROGRESS NOTES
Patient: Prema Hawthorne    @A@    Anesthesia Type: MAC, regional, spinal  Patient location: [facility] Surgical Floor  Last vitals  BP      Temp      Pulse     Resp      SpO2        Post vital signs: stable  Level of consciousness: awake, alert, and oriented    Post-anesthesia pain: adequate analgesia  Airway patency: patent  Respiratory: unassisted  Cardiovascular: stable and blood pressure at baseline  Hydration: euvolemic    Difficult Airway: no  Anesthetic complications: no

## 2025-07-11 NOTE — PLAN OF CARE
Problem: Adult Inpatient Plan of Care  Goal: Plan of Care Review  Recent Flowsheet Documentation  Taken 7/11/2025 0844 by Ritesh Gonzales OTR  Outcome Evaluation: Occupational therapy evaluation completed. Pt s/p left TKA. Education provided regarding compensatory strategies and use of long handled adaptive equipment for lower body adl management s/p knee surgery. Verbal instruction and demonstration provided for use of a dressing stick for lb dressing. Pt managed functional transfers and mobility with SBA using a rolling walker for support. Plan is for HH services upon discharge. No other OT services recommended.

## 2025-07-11 NOTE — THERAPY DISCHARGE NOTE
Acute Care - Occupational Therapy Discharge   Ghulam    Patient Name: Prema Hawthorne  : 1945    MRN: [id number]                              Today's Date: 2025       Admit Date: 7/10/2025    Visit Dx:     ICD-10-CM ICD-9-CM   1. Arthritis of left knee  M17.12 716.96   2. Arthritis of right knee  M17.11 716.96     Patient Active Problem List   Diagnosis    Permanent atrial fibrillation    Hypertension    Morbid obesity    DAVID on CPAP    Ventricular premature beats    Sleep related hypoxia    Hyperlipidemia    Vitamin D deficiency    Post-menopausal bleeding    Arthritis of right knee    Arthritis of left knee     Past Medical History:   Diagnosis Date    Arrhythmia 3/2011    Arthritis     Atrial fibrillation     with rapid ventricular response (CHRONIC)    Cancer 2021    Uterine cancer detected    Cardiomyopathy     nonischemic, Dr Mejia follows, clearance in notes    Essential hypertension     Hyperlipidemia     Knee pain, bilateral     GETS INJECTIONS EVERY 3 MONTHS    Morbid obesity     On anticoagulant therapy     xarelto    DAVID (obstructive sleep apnea)     CPAP nightly    PONV (postoperative nausea and vomiting)     Postmenopausal bleeding     PVC (premature ventricular contraction) 2016    Shortness of breath     EXERTION    Systolic heart failure     Urinary incontinence      Past Surgical History:   Procedure Laterality Date    BLADDER SURGERY      CARDIAC CATHETERIZATION  2011    CATARACT EXTRACTION, BILATERAL      COLONOSCOPY      D & C HYSTEROSCOPY N/A 2021    Procedure: HYSTEROSCOPY WITH  DILATATION AND CURETTAGE, POLYPECTOMY WITH MYOSURE;  Surgeon: Christiane Goldman MD;  Location: [facility];  Service: Obstetrics/Gynecology;  Laterality: N/A;    HYSTERECTOMY  2021    TOTAL KNEE ARTHROPLASTY Right 2024    Procedure: TOTAL KNEE ARTHROPLASTY;  Surgeon: Kadeem Hendricks MD;  Location: [facility];  Service: Orthopedics;  Laterality: Right;      General  Information       Row Name 07/11/25 0838          OT Time and Intention    Subjective Information no complaints  -SD     Document Type discharge evaluation/summary  -SD     Mode of Treatment occupational therapy  -SD     Patient Effort good  -SD       Row Name 07/11/25 0838          General Information    Patient Profile Reviewed yes  pt s/p left TKA, WBAT  -SD     Prior Level of Function independent:;ADL's;all household mobility;community mobility  Pt reports difficulty with lower body adl's.  -SD     Existing Precautions/Restrictions fall  -SD     Barriers to Rehab none identified  -SD       Row Name 07/11/25 0838          Occupational Profile    Reason for Services/Referral (Occupational Profile) decreased adl's s/p knee sx  -SD     Successful Occupations (Occupational Profile) I with basic adls and mobility, yet she reported pain with activity and increasing difficulty  -SD     Occupational History/Life Experiences (Occupational Profile) pt had a right TKA years ago. Pt reports her spouse has dementia, and she provides all transportation  -SD     Environmental Supports and Barriers (Occupational Profile) spouse  -SD     Patient Goals (Occupational Profile) go home  -SD       Row Name 07/11/25 0838          Living Environment    Current Living Arrangements condominium  -SD     People in Home spouse  -SD       Row Name 07/11/25 0838          Home Main Entrance    Number of Stairs, Main Entrance none  -SD       Row Name 07/11/25 0838          Cognition    Orientation Status (Cognition) oriented x 3  -SD               User Key  (r) = Recorded By, (t) = Taken By, (c) = Cosigned By      Initials Name Provider Type    SD Ritesh Gonzales OTR Occupational Therapist                   Mobility/ADL's       Row Name 07/11/25 0841          Bed Mobility    Comment, (Bed Mobility) deferred. up in chair  -SD       Row Name 07/11/25 0841          Sit-Stand Transfer    Sit-Stand Holmes (Transfers) standby assist  -SD      Assistive Device (Sit-Stand Transfers) walker, front-wheeled  -SD       Row Name 07/11/25 0841          Functional Mobility    Functional Mobility- Ind. Level standby assist  -SD     Functional Mobility- Device walker, front-wheeled  -SD     Functional Mobility-Distance (Feet) 150  -SD       Row Name 07/11/25 0841          Activities of Daily Living    BADL Assessment/Intervention lower body dressing  -[facility] Name 07/11/25 0841          Mobility    Extremity Weight-bearing Status left lower extremity  -SD     Left Lower Extremity (Weight-bearing Status) weight-bearing as tolerated (WBAT)  -SD       [facility] Name 07/11/25 0841          Lower Body Dressing Assessment/Training    Assistive Devices (Lower Body Dressing) dressing stick  -SD     Comment, (Lower Body Dressing) pt had concerns regarding lb dressing. She has lh reachers at home, yet she has difficulty using the reachers. Education provided using a dressing stick for lower body dressing.  -SD               User Key  (r) = Recorded By, (t) = Taken By, (c) = Cosigned By      Initials Name Provider Type    Ritesh Ledezma OTR Occupational Therapist                   Obj/Interventions       [facility] Name 07/11/25 0843          Sensory Assessment (Somatosensory)    Sensory Assessment (Somatosensory) sensation intact  sensation intact in LLE following sx. pt reports a hx of numbess of fingers.  -[facility] Name 07/11/25 0843          Range of Motion Comprehensive    Comment, General Range of Motion BUE rom wfl  -[facility] Name 07/11/25 0843          Strength Comprehensive (MMT)    Comment, General Manual Muscle Testing (MMT) Assessment BUE strength wfl  -[facility] Name 07/11/25 0843          Balance    Comment, Balance supervision for sitting balance and SBA for standing balance using rolling walker for support  -SD               User Key  (r) = Recorded By, (t) = Taken By, (c) = Cosigned By      Initials Name Provider Type    Ritesh Ledezma OTR  Occupational Therapist                   Goals/Plan    No documentation.                  Clinical Impression       Row Name 07/11/25 0844          Pain Assessment    Pretreatment Pain Rating 0/10 - no pain  -SD     Posttreatment Pain Rating 0/10 - no pain  -SD       Row Name 07/11/25 0844          Plan of Care Review    Plan of Care Reviewed With patient  -SD     Outcome Evaluation Occupational therapy evaluation completed. Pt s/p left TKA. Education provided regarding compensatory strategies and use of long handled adaptive equipment for lower body adl management s/p knee surgery. Verbal instruction and demonstration provided for use of a dressing stick for lb dressing. Pt managed functional transfers and mobility with SBA using a rolling walker for support. Plan is for HH services upon discharge. No other OT services recommended.  -SD       Row Name 07/11/25 0844          Therapy Assessment/Plan (OT)    Patient/Family Therapy Goal Statement (OT) go home  -SD     Criteria for Skilled Therapeutic Interventions Met (OT) yes;skilled treatment is necessary  -SD     Therapy Frequency (OT) evaluation only  -SD       Row Name 07/11/25 0844          Therapy Plan Review/Discharge Plan (OT)    Equipment Needs Upon Discharge (OT) dressing equipment  -SD     Anticipated Discharge Disposition (OT) home with home health  -SD       Row Name 07/11/25 0844          Positioning and Restraints    Pre-Treatment Position sitting in chair/recliner  -SD     Post Treatment Position chair  -SD     In Chair reclined;call light within reach;encouraged to call for assist;with PT  -SD               User Key  (r) = Recorded By, (t) = Taken By, (c) = Cosigned By      Initials Name Provider Type    Ritesh Ledezma, OTR Occupational Therapist                   Outcome Measures       Row Name 07/11/25 0813          How much help from another is currently needed...    Putting on and taking off regular lower body clothing? 3  -SD     Bathing  (including washing, rinsing, and drying) 3  -SD     Toileting (which includes using toilet bed pan or urinal) 3  -SD     Putting on and taking off regular upper body clothing 4  -SD     Taking care of personal grooming (such as brushing teeth) 4  -SD     Eating meals 4  -SD     AM-PAC 6 Clicks Score (OT) 21  -SD       Row Name 07/10/25 2100          How much help from another person do you currently need...    Turning from your back to your side while in flat bed without using bedrails? 3  -JA     Moving from lying on back to sitting on the side of a flat bed without bedrails? 3  -JA     Moving to and from a bed to a chair (including a wheelchair)? 3  -JA     Standing up from a chair using your arms (e.g., wheelchair, bedside chair)? 3  -JA     Climbing 3-5 steps with a railing? 3  -JA     To walk in hospital room? 3  -JA     AM-PAC 6 Clicks Score (PT) 18  -JA     Highest Level of Mobility Goal Walk 10 Steps or More-6  -JA       Row Name 07/11/25 0847          Functional Assessment    Outcome Measure Options AM-PAC 6 Clicks Daily Activity (OT)  -SD               User Key  (r) = Recorded By, (t) = Taken By, (c) = Cosigned By      Initials Name Provider Type    Chloe Tang, RN Registered Nurse    Ritesh Ledezma OTR Occupational Therapist                  Occupational Therapy Education       Title: PT OT SLP Therapies (In Progress)       Topic: Occupational Therapy (Resolved)       Point: ADL training (Resolved)       Learning Progress Summary            Patient Acceptance, E,TB,D, VU,DU by SD at 7/11/2025 0848    Comment: Education regarding OT services, compensatory strtategies/use of AE for adl's and safety with transfers/mobility.                                      User Key       Initials Effective Dates Name Provider Type Discipline    SD 06/16/21 -  Ritesh Gonzales OTRAE Occupational Therapist OT                  OT Recommendation and Plan  Therapy Frequency (OT): evaluation only  Plan of Care  Review  Plan of Care Reviewed With: patient  Outcome Evaluation: Occupational therapy evaluation completed. Pt s/p left TKA. Education provided regarding compensatory strategies and use of long handled adaptive equipment for lower body adl management s/p knee surgery. Verbal instruction and demonstration provided for use of a dressing stick for lb dressing. Pt managed functional transfers and mobility with SBA using a rolling walker for support. Plan is for HH services upon discharge. No other OT services recommended.  Plan of Care Reviewed With: patient  Outcome Evaluation: Occupational therapy evaluation completed. Pt s/p left TKA. Education provided regarding compensatory strategies and use of long handled adaptive equipment for lower body adl management s/p knee surgery. Verbal instruction and demonstration provided for use of a dressing stick for lb dressing. Pt managed functional transfers and mobility with SBA using a rolling walker for support. Plan is for HH services upon discharge. No other OT services recommended.     Time Calculation:   Evaluation Complexity (OT)  Review Occupational Profile/Medical/Therapy History Complexity: brief/low complexity  Assessment, Occupational Performance/Identification of Deficit Complexity: 1-3 performance deficits  Clinical Decision Making Complexity (OT): problem focused assessment/low complexity  Overall Complexity of Evaluation (OT): low complexity     Time Calculation- OT       Row Name 07/11/25 0849             Time Calculation- OT    OT Start Time 0805  -SD      OT Stop Time 0828  -SD      OT Time Calculation (min) 23 min  -SD         Untimed Charges    OT Eval/Re-eval Minutes 23  -SD         Total Minutes    Untimed Charges Total Minutes 23  -SD       Total Minutes 23  -SD                User Key  (r) = Recorded By, (t) = Taken By, (c) = Cosigned By      Initials Name Provider Type    Ritesh Ledezma, OTR Occupational Therapist                  Therapy Charges  for Today       Code Description Service Date Service Provider Modifiers Qty    75736228125 HC OT EVAL LOW COMPLEXITY 2 7/11/2025 Ritesh Gonzales, OTR GO 1               OT Discharge Summary  Anticipated Discharge Disposition (OT): home with home health  Reason for Discharge: Discharge from facility, other (comment) (Education provided at time of evaluation for adl management s/p TKA.)    EMILY Ochoa  7/11/2025

## 2025-07-11 NOTE — DISCHARGE INSTR - APPOINTMENTS
In patient's chart there should be paperwork with follow up appointment date and time for Dr. Hendricks.      Patient needs to call and make a hospital follow up with Violet BENSON within 1-2 weeks of discharge. [phone].

## 2025-07-12 ENCOUNTER — READMISSION MANAGEMENT (OUTPATIENT)
Dept: CALL CENTER | Facility: HOSPITAL | Age: 80
End: 2025-07-12
Payer: MEDICARE

## 2025-07-12 NOTE — OUTREACH NOTE
Prep Survey      Flowsheet Row Responses   [facility] facility patient discharged from? [place]   Is LACE score < 7 ? Yes   Eligibility Readm Mgmt   Discharge diagnosis Arthritis of left knee   Does the patient have one of the following disease processes/diagnoses(primary or secondary)? Other   Does the patient have Home health ordered? Yes   What is the Home health agency?  [facility]   Is there a DME ordered? No   Medication alerts for this patient see avs   Prep survey completed? Yes            Cristina BOO - Registered Nurse

## 2025-07-14 NOTE — CASE MANAGEMENT/SOCIAL WORK
Case Management Discharge Note      Final Note: DCd home with [facility]         Selected Continued Care - Discharged on 7/11/2025 Admission date: 7/10/2025 - Discharge disposition: Home or Self Care      Destination    No services have been selected for the patient.                Durable Medical Equipment    No services have been selected for the patient.                Dialysis/Infusion    No services have been selected for the patient.                Home Medical Care       Service Provider Services Address Phone Fax Patient Preferred    [facility] [address] [place] [id number] [phone] --              Therapy    No services have been selected for the patient.                Community Resources    No services have been selected for the patient.                Community & DME    No services have been selected for the patient.                         Final Discharge Disposition Code: 06 - home with home health care

## 2025-07-17 ENCOUNTER — READMISSION MANAGEMENT (OUTPATIENT)
Dept: CALL CENTER | Facility: HOSPITAL | Age: 80
End: 2025-07-17
Payer: MEDICARE

## (undated) DEVICE — NDL BIOP BONE MARRW JAMSHIDI IL STERN 15G 24TO48MM

## (undated) DEVICE — 4-PORT MANIFOLD: Brand: NEPTUNE 2

## (undated) DEVICE — FRAZIER SUCTION INSTRUMENT 10 FR W/CONTROL VENT & OBTURATOR: Brand: FRAZIER

## (undated) DEVICE — NDL HYPO ECLPS SFTY 22G 1 1/2IN

## (undated) DEVICE — YANKAUER,BULB TIP,W/O VENT,RIGID,STERILE: Brand: MEDLINE

## (undated) DEVICE — GLV SURG SENSICARE PI LF PF 7.5 GRN STRL

## (undated) DEVICE — ST TBG ENDOMAT HYSTSCPY

## (undated) DEVICE — GLV SURG BIOGEL LTX PF 7

## (undated) DEVICE — SYR LUERLOK 30CC

## (undated) DEVICE — PICO 14 10CM X 30CM US CTN1: Brand: PICO 14

## (undated) DEVICE — NEEDLE, QUINCKE 22GX3.5": Brand: MEDLINE INDUSTRIES, INC.

## (undated) DEVICE — SUT ETHIB 0 CT1 CR8 18IN CX21D

## (undated) DEVICE — GLV SURG SENSICARE PI LF PF 8 GRN STRL

## (undated) DEVICE — GLV SURG SENSICARE W/ALOE PF LF 6.5 STRL

## (undated) DEVICE — GLV SURG SENSICARE PI ORTHO SZ8 LF STRL

## (undated) DEVICE — SYS IRR SURGIPHOR A/MIC RTU BO PVPI 450ML STRL

## (undated) DEVICE — GLV SURG SENSICARE PI PF LF 7 GRN STRL

## (undated) DEVICE — KT MIX CMT PALAMIX/UNO VAC WO/CMT

## (undated) DEVICE — MAT FLR ABSORBENT LG 4FT 10 2.5FT

## (undated) DEVICE — INTENDED USE FOR SURGICAL MARKING ON INTACT SKIN, ALSO PROVIDES A PERMANENT METHOD OF IDENTIFYING OBJECTS IN THE OPERATING ROOM: Brand: WRITESITE® REGULAR TIP SKIN MARKER

## (undated) DEVICE — ELECTRD BLD EZ CLN STD 4IN

## (undated) DEVICE — DISPOSABLE TOURNIQUET CUFF SINGLE BLADDER, SINGLE PORT AND QUICK CONNECT CONNECTOR: Brand: COLOR CUFF

## (undated) DEVICE — DEV TISS REMOV MYOSURE REACH

## (undated) DEVICE — 3M™ DURAPORE™ SURGICAL TAPE 2 INCHES X 10YARDS (5.0CM X 9.1M) 6ROLLS/CARTON 10CARTONS/CASE 1538-2: Brand: 3M™ DURAPORE™

## (undated) DEVICE — DISPOSABLE TOURNIQUET CUFF 34"X4", 1-LINE, BLUE, STERILE, 1EA/PK, 10PK/CS: Brand: ASP MEDICAL

## (undated) DEVICE — SEAL HYSTERSCOPE/OUTFLOW CHANNEL MYOSURE

## (undated) DEVICE — SPNG GZ WOVN 4X4IN 12PLY 10/BX STRL

## (undated) DEVICE — SYR LL TP 10ML STRL

## (undated) DEVICE — SOL IRR H2O BO 1000ML STRL

## (undated) DEVICE — DUAL CUT SAGITTAL BLADE

## (undated) DEVICE — SOL ISO/ALC RUB 70PCT 4OZ

## (undated) DEVICE — DRP SURG U/DRP INVISISHIELD PA 48X52IN

## (undated) DEVICE — PK TOTL 90

## (undated) DEVICE — WRAP KN COMPR COLD/THERP

## (undated) DEVICE — GLV SURG SENSICARE PI ORTHO SZ7.5 LF STRL

## (undated) DEVICE — ZIPPERED TOGA, 2X LARGE: Brand: FLYTE, SURGICOOL

## (undated) DEVICE — ANTIBACTERIAL UNDYED BRAIDED (POLYGLACTIN 910), SYNTHETIC ABSORBABLE SUTURE: Brand: COATED VICRYL

## (undated) DEVICE — PREP SOL POVIDONE/IODINE BT 4OZ

## (undated) DEVICE — OSC HYSTEROSCOPY: Brand: MEDLINE INDUSTRIES, INC.

## (undated) DEVICE — FOAM BUMP ROUND LARGE: Brand: MEDLINE INDUSTRIES, INC.